# Patient Record
Sex: MALE | Race: WHITE | Employment: OTHER | URBAN - METROPOLITAN AREA
[De-identification: names, ages, dates, MRNs, and addresses within clinical notes are randomized per-mention and may not be internally consistent; named-entity substitution may affect disease eponyms.]

---

## 2019-04-17 ENCOUNTER — OFFICE VISIT (OUTPATIENT)
Dept: CARDIOLOGY CLINIC | Age: 74
End: 2019-04-17
Payer: MEDICARE

## 2019-04-17 VITALS
BODY MASS INDEX: 36.93 KG/M2 | WEIGHT: 229.8 LBS | HEIGHT: 66 IN | SYSTOLIC BLOOD PRESSURE: 130 MMHG | HEART RATE: 67 BPM | DIASTOLIC BLOOD PRESSURE: 76 MMHG

## 2019-04-17 DIAGNOSIS — E11.9 TYPE 2 DIABETES MELLITUS WITHOUT COMPLICATION, WITHOUT LONG-TERM CURRENT USE OF INSULIN (HCC): ICD-10-CM

## 2019-04-17 DIAGNOSIS — I48.0 PAROXYSMAL ATRIAL FIBRILLATION (HCC): Primary | ICD-10-CM

## 2019-04-17 DIAGNOSIS — E66.9 OBESITY (BMI 30-39.9): ICD-10-CM

## 2019-04-17 DIAGNOSIS — I10 ESSENTIAL HYPERTENSION: ICD-10-CM

## 2019-04-17 DIAGNOSIS — R06.02 SHORTNESS OF BREATH: ICD-10-CM

## 2019-04-17 DIAGNOSIS — E78.49 OTHER HYPERLIPIDEMIA: ICD-10-CM

## 2019-04-17 DIAGNOSIS — I48.91 ATRIAL FIBRILLATION, UNSPECIFIED TYPE (HCC): ICD-10-CM

## 2019-04-17 DIAGNOSIS — G47.33 OSA (OBSTRUCTIVE SLEEP APNEA): ICD-10-CM

## 2019-04-17 PROCEDURE — 1123F ACP DISCUSS/DSCN MKR DOCD: CPT | Performed by: INTERNAL MEDICINE

## 2019-04-17 PROCEDURE — 3046F HEMOGLOBIN A1C LEVEL >9.0%: CPT | Performed by: INTERNAL MEDICINE

## 2019-04-17 PROCEDURE — G8417 CALC BMI ABV UP PARAM F/U: HCPCS | Performed by: INTERNAL MEDICINE

## 2019-04-17 PROCEDURE — 93000 ELECTROCARDIOGRAM COMPLETE: CPT | Performed by: INTERNAL MEDICINE

## 2019-04-17 PROCEDURE — 2022F DILAT RTA XM EVC RTNOPTHY: CPT | Performed by: INTERNAL MEDICINE

## 2019-04-17 PROCEDURE — 99204 OFFICE O/P NEW MOD 45 MIN: CPT | Performed by: INTERNAL MEDICINE

## 2019-04-17 PROCEDURE — 1036F TOBACCO NON-USER: CPT | Performed by: INTERNAL MEDICINE

## 2019-04-17 PROCEDURE — 4040F PNEUMOC VAC/ADMIN/RCVD: CPT | Performed by: INTERNAL MEDICINE

## 2019-04-17 PROCEDURE — G8427 DOCREV CUR MEDS BY ELIG CLIN: HCPCS | Performed by: INTERNAL MEDICINE

## 2019-04-17 PROCEDURE — 3017F COLORECTAL CA SCREEN DOC REV: CPT | Performed by: INTERNAL MEDICINE

## 2019-04-17 RX ORDER — LOSARTAN POTASSIUM 50 MG/1
1 TABLET ORAL DAILY
COMMUNITY
End: 2022-03-17 | Stop reason: SDUPTHER

## 2019-04-17 RX ORDER — LOVASTATIN 40 MG/1
40 TABLET ORAL DAILY
COMMUNITY
Start: 2018-11-27

## 2019-04-17 RX ORDER — MULTIVIT-MIN/FOLIC/VIT K/LYCOP 400-300MCG
1 TABLET ORAL DAILY
COMMUNITY

## 2019-04-17 RX ORDER — AMLODIPINE BESYLATE 10 MG/1
10 TABLET ORAL DAILY
Status: ON HOLD | COMMUNITY
Start: 2018-11-27 | End: 2020-10-09 | Stop reason: HOSPADM

## 2019-04-17 RX ORDER — WARFARIN SODIUM 6 MG/1
6 TABLET ORAL DAILY
COMMUNITY
End: 2019-08-01 | Stop reason: ALTCHOICE

## 2019-04-17 RX ORDER — ALLOPURINOL 300 MG/1
1 TABLET ORAL DAILY
COMMUNITY

## 2019-04-17 RX ORDER — METFORMIN HYDROCHLORIDE 500 MG/1
1 TABLET, EXTENDED RELEASE ORAL DAILY
COMMUNITY

## 2019-04-17 RX ORDER — ASPIRIN 81 MG/1
1 TABLET, CHEWABLE ORAL DAILY
COMMUNITY

## 2019-04-17 RX ORDER — METOPROLOL TARTRATE 50 MG/1
50 TABLET, FILM COATED ORAL 2 TIMES DAILY
COMMUNITY
End: 2019-08-01 | Stop reason: DRUGHIGH

## 2019-04-17 RX ORDER — HYDROCHLOROTHIAZIDE 25 MG/1
1 TABLET ORAL DAILY
COMMUNITY

## 2019-04-17 RX ORDER — TAMSULOSIN HYDROCHLORIDE 0.4 MG/1
0.4 CAPSULE ORAL DAILY
COMMUNITY
Start: 2018-11-27

## 2019-04-17 NOTE — LETTER
[facility]  [facility] Procedure Sheet    4/17/2019    Naa Jameson  1945    EP Procedures     Pacemaker implant  EP Study    ICD implant  Atrial flutter ablation     Biv implant  Atrial fibrillation ablation    Generator Change  SVT ablation    Lead revision  VT ablation    Lead extraction +/- upgrade  AVN ablation   x Loop implant  Cardioversion     Other:   LISSA     Equipment    x Medtronic   CHRISTELLE Mapping System    [employer]  Carto Mapping System    [employer]  CryoAblation    Biotronik  Laser Lead Extraction    Special Equipment       EP Procedures Scheduling Request    Time Requested     Specific Day    Anesthesia    CT surgery backup    Location [facility] w/ Dr Pascual     Pre-Procedure Labs / Imaging     PT/INR  Type & cross    CBC  Units PRBC    BMP/Mg  Units FFP    Venogram  CXR    Echo  Cardiac CTA for Pulmonary vein mapping     Patient Instructions

## 2019-04-17 NOTE — PROGRESS NOTES
[address]   Electrophysiology Consultation   Date: 4/17/2019  Reason for Consultation: Atrial fibrillation   Consult Requesting Physician: Jamacia Marcelino MD      Chief Complaint   Patient presents with    Atrial Fibrillation        HPI: Jamie Rose is a 68 y.o. seen today at the request of Dr Eldon Junior. He states he was seen at [facility] 2/23/19 with heart racing and elevated blood pressure and shortness of breath . He was in atrial fibrillation and placed on Metoprolol and Xarelto. He then saw his pcp who changed Xarelto to Coumadin due to cost. Other history includes hypertension, hyperlipidemia, diabetes, and sleep apnea. He is newly treated for sleep apnea with C-pap. He does not smoke. Suzie Roman is here with his daughter and wife. Mr Makayla Cordova denies any chest pain, palpitations, HENDERSON, dizziness, or edema at this time. He is physically active. He recently moved here from [place].       Past Medical History:   Diagnosis Date    [facility]     Diabetes mellitus ([facility] [facility]     Hyperlipidemia     Hypertension         History reviewed. No pertinent surgical history. Allergies: Allergies   Allergen Reactions    Amoxicillin Rash       Social History:   reports that he has never smoked. He has never used smokeless tobacco. He reports that he drinks about 1.8 oz of alcohol per week. He reports that he does not use drugs. Family History:  family history includes Arrhythmia in his mother; Heart Attack in his maternal uncle; High Cholesterol in his father; Hypertension in his father. Reviewed. Denies family history of sudden cardiac death, arrhythmia, premature CAD    Review of System:  All other systems reviewed and are negative except for that noted above.  Pertinent negatives are:   General: negative for fever, chills   Ophthalmic ROS: negative for - eye pain or loss of vision  ENT ROS: negative for - headaches, sore throat   Respiratory: negative for - cough, sputum  Cardiovascular:

## 2019-04-17 NOTE — LETTER
[address]  [address]  [address] [address]  Phone: [phone]  Fax: [phone]    Sofia Kahn MD        April 19, 2019     Celeste Barrett MD  [id number]    Patient: Brendan Allen  MR Number: [id number]  YOB: 1945  Date of Visit: 4/17/2019    Dear Dr. Celeste Barrett:    [address]   Electrophysiology Consultation   Date: 4/17/2019  Reason for Consultation: Atrial fibrillation   Consult Requesting Physician: Celeste Barrett MD      Chief Complaint   Patient presents with    Atrial Fibrillation        HPI: Brendan Allen is a 68 y.o. seen today at the request of Dr Mary Novak. He states he was seen at [facility] ER 2/23/19 with heart racing and elevated blood pressure and shortness of breath . He was in atrial fibrillation and placed on Metoprolol and Xarelto. He then saw his pcp who changed Xarelto to Coumadin due to cost. Other history includes hypertension, hyperlipidemia, diabetes, and sleep apnea. He is newly treated for sleep apnea with C-pap. He does not smoke. Jerad Ellis is here with his daughter and wife. Mr Shawn Tillman denies any chest pain, palpitations, HENDERSON, dizziness, or edema at this time. He is physically active. He recently moved here from [place].       Past Medical History:   Diagnosis Date    [facility] [address]     Diabetes mellitus ([facility] [address] [facility]     Hyperlipidemia     Hypertension         History reviewed. No pertinent surgical history. Allergies: Allergies   Allergen Reactions    Amoxicillin Rash       Social History:   reports that he has never smoked. He has never used smokeless tobacco. He reports that he drinks about 1.8 oz of alcohol per week. He reports that he does not use drugs. Family History:  family history includes Arrhythmia in his mother; Heart Attack in his maternal uncle; High Cholesterol in his father; Hypertension in his father. Reviewed. Denies family history of sudden cardiac death, arrhythmia, premature CAD    Review of System:  All other systems reviewed and are negative except for that noted above. Pertinent negatives are:   General: negative for fever, chills   Ophthalmic ROS: negative for - eye pain or loss of vision  ENT ROS: negative for - headaches, sore throat   Respiratory: negative for - cough, sputum  Cardiovascular: Reviewed in HPI  Gastrointestinal: negative for - abdominal pain, diarrhea, N/V  Hematology: negative for - bleeding, blood clots, bruising or jaundice  Genito-Urinary:  negative for - Dysuria or incontinence  Musculoskeletal: negative for - Joint swelling, muscle pain  Neurological: negative for - confusion, dizziness, headaches   Psychiatric: No anxiety, no depression. Dermatological: negative for - rash    Physical Examination:  Vitals:    04/17/19 1547   BP: 130/76   Pulse: 67      Wt Readings from Last 3 Encounters:   04/17/19 229 lb 12.8 oz (104.2 kg)       · Constitutional: Oriented. No distress. obese  · Head: Normocephalic and atraumatic. · Mouth/Throat: Oropharynx is clear and moist.   · Eyes: Conjunctivae normal. EOM are normal.   · Neck: Neck supple. No rigidity. No JVD present. · Cardiovascular: Normal rate, regular rhythm, S1&S2. · Pulmonary/Chest: Bilateral respiratory sounds. No wheezes, No rhonchi. · Abdominal: Soft. Bowel sounds present. No distension, No tenderness. · Musculoskeletal: No tenderness. No edema    · Lymphadenopathy: Has no cervical adenopathy. · Neurological: Alert and oriented. Cranial nerve appears intact, No Gross deficit   · Skin: Skin is warm and dry. No rash noted. · Psychiatric: Has a normal behavior       Labs, diagnostic and imaging results reviewed. Reviewed. ECG:  Sinus arrhythmia 61  Echo: 3/1/19: EF 50-55%. The left ventricular function is low normal.  There is mild global hypokinesis of the left ventricle. The diastolic function is impaired and classified as Grade 2  (psuedonormalization). The left atrium is moderately dilated. There is mild mitral regurgitation. Right ventricular systolic pressure is indeterminate due to poorly  visualized tricuspid regurgitation      Medication:  Current Outpatient Medications   Medication Sig Dispense Refill    allopurinol (ZYLOPRIM) 300 MG tablet Take 1 tablet by mouth daily      metFORMIN (GLUCOPHAGE-XR) 500 MG extended release tablet Take 1 tablet by mouth daily      amLODIPine (NORVASC) 10 MG tablet Take 10 mg by mouth daily      losartan (COZAAR) 50 MG tablet Take 1 tablet by mouth daily      hydrochlorothiazide (HYDRODIURIL) 25 MG tablet Take 1 tablet by mouth daily      lovastatin (MEVACOR) 40 MG tablet Take 40 mg by mouth daily      tamsulosin (FLOMAX) 0.4 MG capsule Take 0.4 mg by mouth daily      aspirin 81 MG chewable tablet Take 1 mg by mouth daily      Specialty Vitamins Products (PROSTATE) TABS Take 2 daily      Multiple Vitamin (ONE-A-DAY MENS) TABS Take 1 tablet by mouth daily      NONFORMULARY Nuretin: takes 2 daily      Multiple Vitamins-Minerals (ICAPS AREDS 2) CAPS Take by mouth 2 daily      warfarin (COUMADIN) 6 MG tablet Take 6 mg by mouth daily      metoprolol tartrate (LOPRESSOR) 50 MG tablet Take 50 mg by mouth 2 times daily       No current facility-administered medications for this visit. Assessment and plan:       1. Paroxysmal atrial fibrillation (HCC)              He is symptomatic with shortness of breath and palpitations    - Afib risk factors including age, HTN, obesity, inactivity and DANO were discussed with patient. Risk factor modification recommended. All questions were answered. - Treatment options including cardioversion, rate control strategy, anticoagulation were discussed with patient. Risks, benefits and alternative of each treatment options were explained. All questions answered. - pcp has placed him on Coumadin due to cost of Xarelto. He has had very good work up by his PCP     [id number] score at least 3. Continue warfarin               - discussed next step with atrial fibrillation including ILR vs MCOT to monitor burden and decide about antiarrhythmic drugs vs ablation or medical therapy    Stress test to assess for CAD     2. Essential hypertension             - follows with pcp, on ARB, CCB, and BB   BP is well controlled. Continue current meds. 3. Other hyperlipidemia             - follows with pcp. On statin   On statin    4. Sleep apnea              - newly diagnosed, using cpap nightly      5. Obesity   Diet and exercise    Plan  Stress  Implantable Loop recorder  F/u 3 mo    Scribe's attestation: This note was scribed in the presence of Dr Gala Cruz MD by Carol Hugo RN.  I, Dr. Angel Chery personally performed the services described in this documentation as scribed by RN in my presence, and it is both accurate and complete.            Thank you for allowing me to participate in the care of [facility]. Further evaluation will be based upon the patient's clinical course and testing results. All questions and concerns were addressed to the patient/family. Alternatives to my treatment were discussed. I have discussed the above stated plan and the patient verbalized understanding and agreed with the plan. NOTE: This report was transcribed using voice recognition software. Every effort was made to ensure accuracy, however, inadvertent computerized transcription errors may be present. Angel Chery MD, Ariella Reyes, [facility]   Office: [phone]           If you have questions, please do not hesitate to call me. I look forward to following Ressie Stage along with you.     Sincerely,        Angel Chery MD

## 2019-04-24 ENCOUNTER — TELEPHONE (OUTPATIENT)
Dept: CARDIOLOGY CLINIC | Age: 74
End: 2019-04-24

## 2019-04-24 ENCOUNTER — HOSPITAL ENCOUNTER (OUTPATIENT)
Dept: NON INVASIVE DIAGNOSTICS | Age: 74
Discharge: HOME | End: 2019-04-24
Payer: MEDICARE

## 2019-04-24 ENCOUNTER — APPOINTMENT (OUTPATIENT)
Dept: CARDIAC CATH/INVASIVE PROCEDURES | Age: 74
End: 2019-04-24
Payer: MEDICARE

## 2019-04-24 DIAGNOSIS — I10 ESSENTIAL HYPERTENSION: ICD-10-CM

## 2019-04-24 DIAGNOSIS — R06.02 SHORTNESS OF BREATH: Primary | ICD-10-CM

## 2019-04-24 DIAGNOSIS — I48.91 ATRIAL FIBRILLATION, UNSPECIFIED TYPE (HCC): ICD-10-CM

## 2019-04-24 LAB
LV EF: 73 %
LVEF MODALITY: NORMAL

## 2019-04-24 PROCEDURE — 93017 CV STRESS TEST TRACING ONLY: CPT | Performed by: INTERNAL MEDICINE

## 2019-04-24 PROCEDURE — 3430000000 HC RX DIAGNOSTIC RADIOPHARMACEUTICAL: Performed by: INTERNAL MEDICINE

## 2019-04-24 PROCEDURE — 78452 HT MUSCLE IMAGE SPECT MULT: CPT

## 2019-04-24 PROCEDURE — A9502 TC99M TETROFOSMIN: HCPCS | Performed by: INTERNAL MEDICINE

## 2019-04-24 RX ADMIN — TETROFOSMIN 30 MILLICURIE: 0.23 INJECTION, POWDER, LYOPHILIZED, FOR SOLUTION INTRAVENOUS at 11:24

## 2019-04-24 RX ADMIN — TETROFOSMIN 10 MILLICURIE: 0.23 INJECTION, POWDER, LYOPHILIZED, FOR SOLUTION INTRAVENOUS at 09:12

## 2019-04-24 NOTE — PROGRESS NOTES
Patient instructed on Franki Protocol Stress Test Procedure including possible side effects and adverse reactions. Verbalizes knowledge and understanding and denies having any questions.

## 2019-04-24 NOTE — TELEPHONE ENCOUNTER
A gentleman called stating that pt ST did not pass medical necessity with the given codes. I resubmitted new order. I was told by Denice Goodrich that they could not do anything with the orde because it was grayed out. I called CS and spoke with Dawn and told her of the dilemma and she is attempting to remove other order.       She was unable to remove so will be contacting help desk to take off the extra

## 2019-04-25 ENCOUNTER — HOSPITAL ENCOUNTER (OUTPATIENT)
Dept: NON INVASIVE DIAGNOSTICS | Age: 74
Discharge: HOME | End: 2019-04-25
Payer: COMMERCIAL

## 2019-04-25 DIAGNOSIS — R06.02 SHORTNESS OF BREATH: ICD-10-CM

## 2019-04-29 ENCOUNTER — HOSPITAL ENCOUNTER (OUTPATIENT)
Dept: CARDIAC CATH/INVASIVE PROCEDURES | Age: 74
Discharge: HOME | End: 2019-04-29
Attending: INTERNAL MEDICINE | Admitting: INTERNAL MEDICINE
Payer: MEDICARE

## 2019-04-29 PROCEDURE — 33285 INSJ SUBQ CAR RHYTHM MNTR: CPT | Performed by: INTERNAL MEDICINE

## 2019-04-29 PROCEDURE — C1764 EVENT RECORDER, CARDIAC: HCPCS

## 2019-04-29 PROCEDURE — 33285 INSJ SUBQ CAR RHYTHM MNTR: CPT

## 2019-04-29 NOTE — PROCEDURES
[address]     Electrophysiology Procedure Note       Date of Procedure: 4/29/2019  Patient's Name: Diego Loaiza  YOB: 1945   Medical Record Number: [id number]  Procedure Performed by: Fiordaliza Foster MD    Procedures performed:    · Loop recorder implantation    Indication of the procedure: Atrial fibrillation , Palpitation   Diego Loaiza is a 68 y.o. male with   Atrial fibrillation   Details of procedure:   Procedure's risks, benefits and alternatives of procedure were explained to patient. All questions were answered. Patient understood and informed consent was obtained. The patient was brought to the electrophysiology lab in a fasting nonsedated state. Patient was prepped and draped in usual sterile fashion. After injection of 2% lidocaine in the left 4th intercostal area, a 5 mm small incision was made. Using ILR insertion device, a small subcutaneous tunnel was created and the loop recorder was inserted under skin. The skin was covered with Steri-Strips. Device information:   The device is Reveal LINQ SN# [id number] Medtronic Implant date: 4/29/2019  R wave 0.41 mv  The device was programmed to detect:   VT: 380 ms 16 beats   Bradycardia: 2000 ms     Plan:   The patient will have usual post-implant care. Patient can be discharged home if remains stable with follow up in arrhythmia clinic.

## 2019-04-29 NOTE — H&P
[address]   Electrophysiology   Date: 4/17/2019  Reason for Consultation: Atrial fibrillation   Consult Requesting Physician: Chelle Altamirano MD      Chief Complaint   Patient presents with    Atrial Fibrillation        HPI: She Sanders is a 68 y.o. seen today at the request of Dr Meeta Ryder. He states he was seen at [facility] ER 2/23/19 with heart racing and elevated blood pressure and shortness of breath . He was in atrial fibrillation and placed on Metoprolol and Xarelto. He then saw his pcp who changed Xarelto to Coumadin due to cost. Other history includes hypertension, hyperlipidemia, diabetes, and sleep apnea. He is newly treated for sleep apnea with C-pap. He does not smoke. Donna Cunningham is here with his daughter and wife. Mr Andrew Salcedo denies any chest pain, palpitations, HENDERSON, dizziness, or edema at this time. He is physically active. He recently moved here from [place].       Past Medical History:   Diagnosis Date    [facility] disorder ([facility] [address]     Diabetes mellitus ([facility] [address]     Hyperlipidemia     Hypertension         History reviewed. No pertinent surgical history. Allergies: Allergies   Allergen Reactions    Amoxicillin Rash       Social History:   reports that he has never smoked. He has never used smokeless tobacco. He reports that he drinks about 1.8 oz of alcohol per week. He reports that he does not use drugs. Family History:  family history includes Arrhythmia in his mother; Heart Attack in his maternal uncle; High Cholesterol in his father; Hypertension in his father. Reviewed. Denies family history of sudden cardiac death, arrhythmia, premature CAD    Review of System:  All other systems reviewed and are negative except for that noted above.  Pertinent negatives are:   General: negative for fever, chills   Ophthalmic ROS: negative for - eye pain or loss of vision  ENT ROS: negative for - headaches, sore throat   Respiratory: negative for - cough, sputum  Cardiovascular: Reviewed in HPI  Gastrointestinal: negative for - abdominal pain, diarrhea, N/V  Hematology: negative for - bleeding, blood clots, bruising or jaundice  Genito-Urinary:  negative for - Dysuria or incontinence  Musculoskeletal: negative for - Joint swelling, muscle pain  Neurological: negative for - confusion, dizziness, headaches   Psychiatric: No anxiety, no depression. Dermatological: negative for - rash    Physical Examination:  Vitals:    04/17/19 1547   BP: 130/76   Pulse: 67      Wt Readings from Last 3 Encounters:   04/17/19 229 lb 12.8 oz (104.2 kg)       · Constitutional: Oriented. No distress. obese  · Head: Normocephalic and atraumatic. · Mouth/Throat: Oropharynx is clear and moist.   · Eyes: Conjunctivae normal. EOM are normal.   · Neck: Neck supple. No rigidity. No JVD present. · Cardiovascular: Normal rate, regular rhythm, S1&S2. · Pulmonary/Chest: Bilateral respiratory sounds. No wheezes, No rhonchi. · Abdominal: Soft. Bowel sounds present. No distension, No tenderness. · Musculoskeletal: No tenderness. No edema    · Lymphadenopathy: Has no cervical adenopathy. · Neurological: Alert and oriented. Cranial nerve appears intact, No Gross deficit   · Skin: Skin is warm and dry. No rash noted. · Psychiatric: Has a normal behavior       Labs, diagnostic and imaging results reviewed. Reviewed. ECG:  Sinus arrhythmia 61  Echo: 3/1/19: EF 50-55%. The left ventricular function is low normal.  There is mild global hypokinesis of the left ventricle. The diastolic function is impaired and classified as Grade 2  (psuedonormalization). The left atrium is moderately dilated. There is mild mitral regurgitation.   Right ventricular systolic pressure is indeterminate due to poorly  visualized tricuspid regurgitation      Medication:  Current Outpatient Medications   Medication Sig Dispense Refill    allopurinol (ZYLOPRIM) 300 MG tablet Take 1 tablet by mouth daily      metFORMIN (GLUCOPHAGE-XR) 500 MG extended release tablet Take 1 tablet by mouth daily      amLODIPine (NORVASC) 10 MG tablet Take 10 mg by mouth daily      losartan (COZAAR) 50 MG tablet Take 1 tablet by mouth daily      hydrochlorothiazide (HYDRODIURIL) 25 MG tablet Take 1 tablet by mouth daily      lovastatin (MEVACOR) 40 MG tablet Take 40 mg by mouth daily      tamsulosin (FLOMAX) 0.4 MG capsule Take 0.4 mg by mouth daily      aspirin 81 MG chewable tablet Take 1 mg by mouth daily      Specialty Vitamins Products (PROSTATE) TABS Take 2 daily      Multiple Vitamin (ONE-A-DAY MENS) TABS Take 1 tablet by mouth daily      NONFORMULARY Nuretin: takes 2 daily      Multiple Vitamins-Minerals (ICAPS AREDS 2) CAPS Take by mouth 2 daily      warfarin (COUMADIN) 6 MG tablet Take 6 mg by mouth daily      metoprolol tartrate (LOPRESSOR) 50 MG tablet Take 50 mg by mouth 2 times daily       No current facility-administered medications for this visit. Assessment and plan:       1. Paroxysmal atrial fibrillation (HCC)              He is symptomatic with shortness of breath and palpitations    - Afib risk factors including age, HTN, obesity, inactivity and DANO were discussed with patient. Risk factor modification recommended. All questions were answered. - Treatment options including cardioversion, rate control strategy, anticoagulation were discussed with patient. Risks, benefits and alternative of each treatment options were explained. All questions answered. - pcp has placed him on Coumadin due to cost of Xarelto. He has had very good work up by his PCP     [id number] score at least 3. Continue warfarin               - discussed next step with atrial fibrillation including ILR vs MCOT to monitor burden and decide about antiarrhythmic drugs vs ablation or medical therapy    Stress test to assess for CAD     2. Essential hypertension             - follows with pcp, on ARB, CCB, and BB   BP is well controlled. Continue current meds. 3. Other hyperlipidemia             - follows with pcp. On statin   On statin    4. Sleep apnea              - newly diagnosed, using cpap nightly      5. Obesity   Diet and exercise    Plan    Implantable Loop recorder    NOTE: This report was transcribed using voice recognition software. Every effort was made to ensure accuracy, however, inadvertent computerized transcription errors may be present.        Ben Pardo MD, [facility] [address] [facility]   Office: [phone]

## 2019-05-07 ENCOUNTER — TELEPHONE (OUTPATIENT)
Dept: CARDIOLOGY CLINIC | Age: 74
End: 2019-05-07

## 2019-05-07 NOTE — TELEPHONE ENCOUNTER
Pt calling, he has a device ck tomorrow and just wanted to let KAUR know that he has been in Afib every day for a week now. He is wanting to see if someone can look at his readings while in the office tomorrow. Pls call to advise. Thank you.

## 2019-05-08 ENCOUNTER — PROCEDURE VISIT (OUTPATIENT)
Dept: CARDIOLOGY CLINIC | Age: 74
End: 2019-05-08
Payer: MEDICARE

## 2019-05-08 DIAGNOSIS — Z45.09 ENCOUNTER FOR ELECTRONIC ANALYSIS OF REVEAL EVENT RECORDER: Primary | ICD-10-CM

## 2019-05-08 DIAGNOSIS — I48.0 PAROXYSMAL ATRIAL FIBRILLATION (HCC): ICD-10-CM

## 2019-05-08 PROCEDURE — 93291 INTERROG DEV EVAL SCRMS IP: CPT | Performed by: INTERNAL MEDICINE

## 2019-05-08 NOTE — PROGRESS NOTES
Patient comes in for interrogation of his implanted loop recorder. His incision is healing nicely. Steri strips remain secured. Interrogation shows patient to be back in AF. He states he feels miserable when he is in this and this is the longest he has been in AF. Today we moved his appt up with Dr. Canelo Blake to next week. He remains on coumadin.

## 2019-05-16 ENCOUNTER — NURSE ONLY (OUTPATIENT)
Dept: CARDIOLOGY CLINIC | Age: 74
End: 2019-05-16
Payer: MEDICARE

## 2019-05-16 ENCOUNTER — OFFICE VISIT (OUTPATIENT)
Dept: CARDIOLOGY CLINIC | Age: 74
End: 2019-05-16
Payer: MEDICARE

## 2019-05-16 VITALS
WEIGHT: 231 LBS | HEIGHT: 66 IN | HEART RATE: 73 BPM | DIASTOLIC BLOOD PRESSURE: 74 MMHG | BODY MASS INDEX: 37.12 KG/M2 | SYSTOLIC BLOOD PRESSURE: 114 MMHG

## 2019-05-16 DIAGNOSIS — E78.2 MIXED HYPERLIPIDEMIA: ICD-10-CM

## 2019-05-16 DIAGNOSIS — Z45.09 ENCOUNTER FOR ELECTRONIC ANALYSIS OF REVEAL EVENT RECORDER: ICD-10-CM

## 2019-05-16 DIAGNOSIS — I48.0 PAROXYSMAL ATRIAL FIBRILLATION (HCC): ICD-10-CM

## 2019-05-16 DIAGNOSIS — G47.33 OSA (OBSTRUCTIVE SLEEP APNEA): ICD-10-CM

## 2019-05-16 DIAGNOSIS — I48.0 PAROXYSMAL ATRIAL FIBRILLATION (HCC): Primary | ICD-10-CM

## 2019-05-16 DIAGNOSIS — I10 BENIGN ESSENTIAL HTN: ICD-10-CM

## 2019-05-16 PROCEDURE — G8427 DOCREV CUR MEDS BY ELIG CLIN: HCPCS | Performed by: INTERNAL MEDICINE

## 2019-05-16 PROCEDURE — 93291 INTERROG DEV EVAL SCRMS IP: CPT | Performed by: INTERNAL MEDICINE

## 2019-05-16 PROCEDURE — 4040F PNEUMOC VAC/ADMIN/RCVD: CPT | Performed by: INTERNAL MEDICINE

## 2019-05-16 PROCEDURE — 1036F TOBACCO NON-USER: CPT | Performed by: INTERNAL MEDICINE

## 2019-05-16 PROCEDURE — 93000 ELECTROCARDIOGRAM COMPLETE: CPT | Performed by: INTERNAL MEDICINE

## 2019-05-16 PROCEDURE — 99214 OFFICE O/P EST MOD 30 MIN: CPT | Performed by: INTERNAL MEDICINE

## 2019-05-16 PROCEDURE — G8417 CALC BMI ABV UP PARAM F/U: HCPCS | Performed by: INTERNAL MEDICINE

## 2019-05-16 PROCEDURE — 3017F COLORECTAL CA SCREEN DOC REV: CPT | Performed by: INTERNAL MEDICINE

## 2019-05-16 PROCEDURE — 1123F ACP DISCUSS/DSCN MKR DOCD: CPT | Performed by: INTERNAL MEDICINE

## 2019-05-16 NOTE — PROGRESS NOTES
[address]   Electrophysiology Follow Up   Date: 5/16/2019    Chief Complaint   Patient presents with    Atrial Fibrillation        HPI: Richard Sierra is a 68 y.o. seen today at the request of Dr Yousif Sanderson. He states he was seen at [facility] ER 2/23/19 with heart racing and elevated blood pressure and shortness of breath . He was in atrial fibrillation and placed on Metoprolol and Xarelto. He then saw his pcp who changed Xarelto to Coumadin due to cost. Other history includes hypertension, hyperlipidemia, diabetes, and sleep apnea. He is newly treated for sleep apnea with C-pap. He does not smoke. Francisco Bee is here with his daughter and wife. Mr Matilda Brito denies any chest pain, palpitations, HENDERSON, dizziness, or edema at this time. He is physically active. He recently moved here from [address] [place] [address] states that he can feel he is out of rhythm. He does have complaints of fatigue. He is on coumadin and is followed by the coumadin clinic. He states that his stamina is at about 50 %. Past Medical History:   Diagnosis Date    [facility] [facility]     Diabetes mellitus ([facility]     Hyperlipidemia     Hypertension         History reviewed. No pertinent surgical history. Allergies: Allergies   Allergen Reactions    Amoxicillin Rash       Social History:   reports that he has never smoked. He has never used smokeless tobacco. He reports that he drinks about 1.8 oz of alcohol per week. He reports that he does not use drugs. Family History:  family history includes Arrhythmia in his mother; Heart Attack in his maternal uncle; High Cholesterol in his father; Hypertension in his father. Reviewed. Denies family history of sudden cardiac death, arrhythmia, premature CAD    Review of System:  All other systems reviewed and are negative except for that noted above.  Pertinent negatives are:   General: negative for fever, chills   Ophthalmic ROS: negative for - eye pain or loss of vision  ENT ROS: negative for - headaches, sore throat   Respiratory: negative for - cough, sputum  Cardiovascular: Reviewed in HPI  Gastrointestinal: negative for - abdominal pain, diarrhea, N/V  Hematology: negative for - bleeding, blood clots, bruising or jaundice  Genito-Urinary:  negative for - Dysuria or incontinence  Musculoskeletal: negative for - Joint swelling, muscle pain  Neurological: negative for - confusion, dizziness, headaches   Psychiatric: No anxiety, no depression. Dermatological: negative for - rash    Physical Examination:  Vitals:    19 1120   BP: 114/74   Pulse: 73      Wt Readings from Last 3 Encounters:   19 231 lb (104.8 kg)   19 229 lb 12.8 oz (104.2 kg)       · Constitutional: Oriented. No distress. obese  · Head: Normocephalic and atraumatic. · Mouth/Throat: Oropharynx is clear and moist.   · Eyes: Conjunctivae normal. EOM are normal.   · Neck: Neck supple. No rigidity. No JVD present. · Cardiovascular: Normal rate, irregular rhythm, S1&S2. · Pulmonary/Chest: Bilateral respiratory sounds. No wheezes, No rhonchi. · Abdominal: Soft. Bowel sounds present. No distension, No tenderness. · Musculoskeletal: No tenderness. No edema    · Lymphadenopathy: Has no cervical adenopathy. · Neurological: Alert and oriented. Cranial nerve appears intact, No Gross deficit   · Skin: Skin is warm and dry. No rash noted. · Psychiatric: Has a normal behavior       Labs, diagnostic and imaging results reviewed. Reviewed. No results found for: TSHREFLEX, TSH, CREATININE, AMIOD, AST, ALT    EC19  Afib  QTc 404    Nuclear Stress 19  Summary    Normal LV size with hyperdynamic systolic function.    Left ventricular ejection fraction of 73 %.    There is normal isotope uptake at stress and rest.    There is no evidence of myocardial ischemia or scar. Echo: 3/1/19: EF 50-55%. The left ventricular function is low normal.  There is mild global hypokinesis of the left ventricle.   The diastolic function is impaired and classified as Grade 2  (psuedonormalization). The left atrium is moderately dilated. There is mild mitral regurgitation. Right ventricular systolic pressure is indeterminate due to poorly  visualized tricuspid regurgitation      Medication:  Current Outpatient Medications   Medication Sig Dispense Refill    allopurinol (ZYLOPRIM) 300 MG tablet Take 1 tablet by mouth daily      metFORMIN (GLUCOPHAGE-XR) 500 MG extended release tablet Take 1 tablet by mouth daily      amLODIPine (NORVASC) 10 MG tablet Take 10 mg by mouth daily      losartan (COZAAR) 50 MG tablet Take 1 tablet by mouth daily      hydrochlorothiazide (HYDRODIURIL) 25 MG tablet Take 1 tablet by mouth daily      lovastatin (MEVACOR) 40 MG tablet Take 40 mg by mouth daily      tamsulosin (FLOMAX) 0.4 MG capsule Take 0.4 mg by mouth daily      aspirin 81 MG chewable tablet Take 1 mg by mouth daily      Specialty Vitamins Products (PROSTATE) TABS Take 2 daily      Multiple Vitamin (ONE-A-DAY MENS) TABS Take 1 tablet by mouth daily      NONFORMULARY Nuretin: takes 2 daily      Multiple Vitamins-Minerals (ICAPS AREDS 2) CAPS Take by mouth 2 daily      warfarin (COUMADIN) 6 MG tablet Take 6 mg by mouth daily      metoprolol tartrate (LOPRESSOR) 50 MG tablet Take 50 mg by mouth 2 times daily       No current facility-administered medications for this visit. Assessment and plan:       Paroxysmal atrial fibrillation (HCC)   Will Schedule DCCV and if he goes back into afib discussion of antiarrythmic medications and ablation will be considered           He is symptomatic with shortness of breath and palpitations    - Afib risk factors including age, HTN, obesity, inactivity and DANO were discussed with patient. Risk factor modification recommended. All questions were answered. - Treatment options including cardioversion, rate control strategy, anticoagulation were discussed with patient.  Risks, benefits

## 2019-05-16 NOTE — LETTER
[facility]  [facility]  Codi Elam  1945  EP Procedures     Pacemaker implant (single/dual)  EP Study    ICD implant (single/dual)  Atrial flutter ablation (LISSA Y/N)    Biv implant ICD  Cryoablation    Biv implant PPM  Atrial fibrillation ablation (LISSA Yes)    Generator Change (PPM/ICD/BiV)  SVT ablation    Lead revision (RV/LA/RA) (<1 month)  VT ablation      Lead extraction +/- upgrade (BiV/PPM/ICD)  VT Ischemic/ non-ischemic    Loop implant/ removal  VT RVOT   xxxx Cardioversion  VT Left sided    LISAS  AVN ablation     Equipment     [employer]   CHRISTELLE Mapping System    [employer]  [id number] [address] [employer]  CryoAblation    [employer]  Laser Lead Extraction    Special Equipment       EP Procedures Scheduling Request    # hours Requested     Specific Day    Anesthesia    CT surgery backup    Location [facility]     Pre-Procedure Labs / Imaging     PT/INR  Type & cross    CBC  Units PRBC    BMP/Mg  Units FFP    Venogram  CXR    Echo  Cardiac CTA for Pulmonary vein mapping     RN INITIALS:RH  Patient Instructions  Do not eat or drink after midnight the night prior to procedure  Dx: Afib

## 2019-05-31 ENCOUNTER — HOSPITAL ENCOUNTER (OUTPATIENT)
Dept: CARDIAC CATH/INVASIVE PROCEDURES | Age: 74
Discharge: HOME | End: 2019-05-31
Attending: INTERNAL MEDICINE | Admitting: INTERNAL MEDICINE
Payer: MEDICARE

## 2019-05-31 LAB
EKG ATRIAL RATE: 102 BPM
EKG DIAGNOSIS: NORMAL
EKG Q-T INTERVAL: 404 MS
EKG QRS DURATION: 100 MS
EKG QTC CALCULATION (BAZETT): 451 MS
EKG R AXIS: 38 DEGREES
EKG T AXIS: 35 DEGREES
EKG VENTRICULAR RATE: 75 BPM
GFR AFRICAN AMERICAN: >60
GFR NON-AFRICAN AMERICAN: >60
GLUCOSE BLD-MCNC: 98 MG/DL (ref 70–99)
INR BLD: 2.8 (ref 0.85–1.15)
PERFORMED ON: ABNORMAL
POC BUN: 26 MG/DL (ref 7–18)
POC CREATININE: 0.9 MG/DL (ref 0.8–1.3)
POC HEMATOCRIT: 44 % (ref 40.5–52.5)
POC POTASSIUM: 4.6 MMOL/L (ref 3.5–5.1)
POC SAMPLE TYPE: ABNORMAL
POC SODIUM: 139 MMOL/L (ref 136–145)

## 2019-05-31 PROCEDURE — 82947 ASSAY GLUCOSE BLOOD QUANT: CPT

## 2019-05-31 PROCEDURE — 84520 ASSAY OF UREA NITROGEN: CPT

## 2019-05-31 PROCEDURE — 84295 ASSAY OF SERUM SODIUM: CPT

## 2019-05-31 PROCEDURE — 93010 ELECTROCARDIOGRAM REPORT: CPT | Performed by: INTERNAL MEDICINE

## 2019-05-31 PROCEDURE — 82565 ASSAY OF CREATININE: CPT

## 2019-05-31 PROCEDURE — 84132 ASSAY OF SERUM POTASSIUM: CPT

## 2019-05-31 PROCEDURE — 2500000003 HC RX 250 WO HCPCS

## 2019-05-31 PROCEDURE — 92960 CARDIOVERSION ELECTRIC EXT: CPT | Performed by: INTERNAL MEDICINE

## 2019-05-31 PROCEDURE — 85610 PROTHROMBIN TIME: CPT

## 2019-05-31 PROCEDURE — 93005 ELECTROCARDIOGRAM TRACING: CPT | Performed by: INTERNAL MEDICINE

## 2019-05-31 PROCEDURE — 85014 HEMATOCRIT: CPT

## 2019-05-31 PROCEDURE — 92960 CARDIOVERSION ELECTRIC EXT: CPT

## 2019-05-31 PROCEDURE — 7100000010 HC PHASE II RECOVERY - FIRST 15 MIN

## 2019-05-31 NOTE — H&P
[facility]   Electrophysiology    Chief Complaint   Patient presents with    Atrial Fibrillation        HPI: Anne Rooney is a 68 y.o. seen today at the request of Dr Tello Hopkins. He states he was seen at [facility] ER 2/23/19 with heart racing and elevated blood pressure and shortness of breath . He was in atrial fibrillation and placed on Metoprolol and Xarelto. He then saw his pcp who changed Xarelto to Coumadin due to cost. Other history includes hypertension, hyperlipidemia, diabetes, and sleep apnea. He is newly treated for sleep apnea with C-pap. He does not smoke. Orquidea Otero is here with his daughter and wife. Mr Oscar Bazan denies any chest pain, palpitations, HENDERSON, dizziness, or edema at this time. He is physically active. He recently moved here from [address] [place] [address] states that he can feel he is out of rhythm. He does have complaints of fatigue. He is on coumadin and is followed by the coumadin clinic. He states that his stamina is at about 50 %. Past Medical History:   Diagnosis Date    Clotting disorder ([facility] [address]     Diabetes mellitus ([facility] [address] [facility]     Hyperlipidemia     Hypertension         History reviewed. No pertinent surgical history. Allergies: Allergies   Allergen Reactions    Amoxicillin Rash       Social History:   reports that he has never smoked. He has never used smokeless tobacco. He reports that he drinks about 1.8 oz of alcohol per week. He reports that he does not use drugs. Family History:  family history includes Arrhythmia in his mother; Heart Attack in his maternal uncle; High Cholesterol in his father; Hypertension in his father. Reviewed. Denies family history of sudden cardiac death, arrhythmia, premature CAD    Review of System:  All other systems reviewed and are negative except for that noted above.  Pertinent negatives are:   General: negative for fever, chills   Ophthalmic ROS: negative for - eye pain or loss of vision  ENT ROS: negative for - headaches, sore throat Respiratory: negative for - cough, sputum  Cardiovascular: Reviewed in HPI  Gastrointestinal: negative for - abdominal pain, diarrhea, N/V  Hematology: negative for - bleeding, blood clots, bruising or jaundice  Genito-Urinary:  negative for - Dysuria or incontinence  Musculoskeletal: negative for - Joint swelling, muscle pain  Neurological: negative for - confusion, dizziness, headaches   Psychiatric: No anxiety, no depression. Dermatological: negative for - rash    Physical Examination:  Vitals:    19 1120   BP: 114/74   Pulse: 73      Wt Readings from Last 3 Encounters:   19 231 lb (104.8 kg)   19 229 lb 12.8 oz (104.2 kg)       · Constitutional: Oriented. No distress. obese  · Head: Normocephalic and atraumatic. · Mouth/Throat: Oropharynx is clear and moist.   · Eyes: Conjunctivae normal. EOM are normal.   · Neck: Neck supple. No rigidity. No JVD present. · Cardiovascular: Normal rate, irregular rhythm, S1&S2. · Pulmonary/Chest: Bilateral respiratory sounds. No wheezes, No rhonchi. · Abdominal: Soft. Bowel sounds present. No distension, No tenderness. · Musculoskeletal: No tenderness. No edema    · Lymphadenopathy: Has no cervical adenopathy. · Neurological: Alert and oriented. Cranial nerve appears intact, No Gross deficit   · Skin: Skin is warm and dry. No rash noted. · Psychiatric: Has a normal behavior       Labs, diagnostic and imaging results reviewed. Reviewed. No results found for: TSHREFLEX, TSH, CREATININE, AMIOD, AST, ALT    EC19  Afib  QTc 404    Nuclear Stress 19  Summary    Normal LV size with hyperdynamic systolic function.    Left ventricular ejection fraction of 73 %.    There is normal isotope uptake at stress and rest.    There is no evidence of myocardial ischemia or scar. Echo: 3/1/19: EF 50-55%. The left ventricular function is low normal.  There is mild global hypokinesis of the left ventricle.   The diastolic function is impaired and classified as Grade 2  (psuedonormalization). The left atrium is moderately dilated. There is mild mitral regurgitation. Right ventricular systolic pressure is indeterminate due to poorly  visualized tricuspid regurgitation      Medication:  Current Outpatient Medications   Medication Sig Dispense Refill    allopurinol (ZYLOPRIM) 300 MG tablet Take 1 tablet by mouth daily      metFORMIN (GLUCOPHAGE-XR) 500 MG extended release tablet Take 1 tablet by mouth daily      amLODIPine (NORVASC) 10 MG tablet Take 10 mg by mouth daily      losartan (COZAAR) 50 MG tablet Take 1 tablet by mouth daily      hydrochlorothiazide (HYDRODIURIL) 25 MG tablet Take 1 tablet by mouth daily      lovastatin (MEVACOR) 40 MG tablet Take 40 mg by mouth daily      tamsulosin (FLOMAX) 0.4 MG capsule Take 0.4 mg by mouth daily      aspirin 81 MG chewable tablet Take 1 mg by mouth daily      Specialty Vitamins Products (PROSTATE) TABS Take 2 daily      Multiple Vitamin (ONE-A-DAY MENS) TABS Take 1 tablet by mouth daily      NONFORMULARY Nuretin: takes 2 daily      Multiple Vitamins-Minerals (ICAPS AREDS 2) CAPS Take by mouth 2 daily      warfarin (COUMADIN) 6 MG tablet Take 6 mg by mouth daily      metoprolol tartrate (LOPRESSOR) 50 MG tablet Take 50 mg by mouth 2 times daily       No current facility-administered medications for this visit. Assessment and plan:       Paroxysmal atrial fibrillation (HCC)   Will Schedule DCCV and if he goes back into afib discussion of antiarrythmic medications and ablation will be considered           He is symptomatic with shortness of breath and palpitations    - Afib risk factors including age, HTN, obesity, inactivity and DANO were discussed with patient. Risk factor modification recommended. All questions were answered. - Treatment options including cardioversion, rate control strategy, anticoagulation were discussed with patient.  Risks, benefits and alternative of each treatment options were explained. All questions answered. - pcp has placed him on Coumadin due to cost of Xarelto. He has had very good work up by his PCP     [id number] score at least 3. Continue warfarin  S/p ILR  The CIED was interrogated and programmed and I supervised and reviewed all the data. All findings and changes are in device interrogation sheat and reflect my personal interpretation and changes and is scanned to Epic. Afib burden 100%    Essential hypertension             - follows with pcp, on ARB, CCB, and BB   BP is well controlled. Continue current meds. Other hyperlipidemia             - follows with pcp. On statin   On statin    Sleep apnea              - newly diagnosed, using cpap nightly      Obesity   Diet and exercise    Plan  [facility]        Thank you for allowing me to participate in the care of Iva Ibarra. Further evaluation will be based upon the patient's clinical course and testing results. All questions and concerns were addressed to the patient/family. Alternatives to my treatment were discussed. I have discussed the above stated plan and the patient verbalized understanding and agreed with the plan. NOTE: This report was transcribed using voice recognition software. Every effort was made to ensure accuracy, however, inadvertent computerized transcription errors may be present.        Gonzalo Meyers MD, [facility] [address] [facility]   Office: [phone]

## 2019-05-31 NOTE — PROCEDURES
[address]     Electrophysiology Procedure Note       Date of Procedure: 5/31/2019  Patient's Name: Karl Lazaro  YOB: 1945   Medical Record Number: [id number]  Procedure Performed by: Fariba Aguilar MD    Procedures performed:  IV sedation. External Electrical cardioversion     Indication of the procedure: Persistent atrial fibrillation   details of procedure: The patient was brought to the cath lab area in a fasting and non-sedated state. The risks, benefits and alternatives of the procedure were discussed with the patient. The patient opted to proceed with the procedure. Written informed consent was signed and placed in the chart. A timeout protocol was completed to identify the patient and the procedure being performed. Patient is on chronic anticoagulation therapy. Then we used Brevital for sedation and electrical DC cardioversion was perfomred using 200J, synchronized shock. Patient was converted to sinus rhythm at 54 bpm. The patient tolerated the procedure well and there were no complications. Conclusion:   Successful external DC cardioversion of atrial fibrillation . Plan:   The patient can be discharged if remains stable. Will continue with medical therapy.

## 2019-06-25 ENCOUNTER — NURSE ONLY (OUTPATIENT)
Dept: CARDIOLOGY CLINIC | Age: 74
End: 2019-06-25
Payer: MEDICARE

## 2019-06-25 DIAGNOSIS — I48.0 PAROXYSMAL ATRIAL FIBRILLATION (HCC): ICD-10-CM

## 2019-06-25 DIAGNOSIS — Z45.09 ENCOUNTER FOR ELECTRONIC ANALYSIS OF REVEAL EVENT RECORDER: ICD-10-CM

## 2019-06-25 PROCEDURE — 93299 PR REM INTERROG ICPMS/SCRMS <30 D TECH REVIEW: CPT | Performed by: INTERNAL MEDICINE

## 2019-06-25 PROCEDURE — 93298 REM INTERROG DEV EVAL SCRMS: CPT | Performed by: INTERNAL MEDICINE

## 2019-07-05 ENCOUNTER — TELEPHONE (OUTPATIENT)
Dept: CARDIOLOGY CLINIC | Age: 74
End: 2019-07-05

## 2019-07-30 NOTE — PROGRESS NOTES
[facility]   Electrophysiology Follow Up   Date: 8/1/2019    Chief Complaint   Patient presents with    Atrial Fibrillation        HPI: Nidia Rider is a 68 y.o. seen today at the request of Dr Jose Guzman. He states he was seen at [facility] ER 2/23/19 with heart racing and elevated blood pressure and shortness of breath . He was in atrial fibrillation and placed on Metoprolol and Xarelto. He then saw his pcp who changed Xarelto to Coumadin due to cost. Other history includes hypertension, hyperlipidemia, diabetes, and sleep apnea. He is newly treated for sleep apnea with C-pap. He does not smoke. He is physically active. He recently moved here from [place].     4/29/19:  ILR Implanted  5/31/19   DCCV    Interval history: Today he remains in sinus rhythm. He feels better in sinus. He asked about different blood thinners. Past Medical History:   Diagnosis Date    [facility] disorder ([facility] [address]     Diabetes mellitus ([facility] [address] [facility]     Hyperlipidemia     Hypertension         History reviewed. No pertinent surgical history. Allergies: Allergies   Allergen Reactions    Amoxicillin Rash       Social History:   reports that he has never smoked. He has never used smokeless tobacco. He reports that he drinks about 3.0 standard drinks of alcohol per week. He reports that he does not use drugs. Family History:  family history includes Arrhythmia in his mother; Heart Attack in his maternal uncle; High Cholesterol in his father; Hypertension in his father. Reviewed. Denies family history of sudden cardiac death, arrhythmia, premature CAD    Review of System:  All other systems reviewed and are negative except for that noted above.  Pertinent negatives are:   General: negative for fever, chills   Ophthalmic ROS: negative for - eye pain or loss of vision  ENT ROS: negative for - headaches, sore throat   Respiratory: negative for - cough, sputum  Cardiovascular: Reviewed in HPI  Gastrointestinal: negative for - abdominal

## 2019-08-01 ENCOUNTER — OFFICE VISIT (OUTPATIENT)
Dept: CARDIOLOGY CLINIC | Age: 74
End: 2019-08-01
Payer: MEDICARE

## 2019-08-01 ENCOUNTER — NURSE ONLY (OUTPATIENT)
Dept: CARDIOLOGY CLINIC | Age: 74
End: 2019-08-01
Payer: MEDICARE

## 2019-08-01 VITALS
HEIGHT: 66 IN | HEART RATE: 46 BPM | BODY MASS INDEX: 36.32 KG/M2 | SYSTOLIC BLOOD PRESSURE: 135 MMHG | DIASTOLIC BLOOD PRESSURE: 73 MMHG | WEIGHT: 226 LBS

## 2019-08-01 DIAGNOSIS — Z45.09 ENCOUNTER FOR ELECTRONIC ANALYSIS OF REVEAL EVENT RECORDER: ICD-10-CM

## 2019-08-01 DIAGNOSIS — E78.2 MIXED HYPERLIPIDEMIA: ICD-10-CM

## 2019-08-01 DIAGNOSIS — I48.0 PAROXYSMAL ATRIAL FIBRILLATION (HCC): Primary | ICD-10-CM

## 2019-08-01 DIAGNOSIS — I48.0 PAROXYSMAL ATRIAL FIBRILLATION (HCC): ICD-10-CM

## 2019-08-01 DIAGNOSIS — E66.9 OBESITY (BMI 30-39.9): ICD-10-CM

## 2019-08-01 DIAGNOSIS — G47.33 OSA (OBSTRUCTIVE SLEEP APNEA): ICD-10-CM

## 2019-08-01 DIAGNOSIS — I10 BENIGN ESSENTIAL HTN: ICD-10-CM

## 2019-08-01 PROCEDURE — 1123F ACP DISCUSS/DSCN MKR DOCD: CPT | Performed by: INTERNAL MEDICINE

## 2019-08-01 PROCEDURE — 99214 OFFICE O/P EST MOD 30 MIN: CPT | Performed by: INTERNAL MEDICINE

## 2019-08-01 PROCEDURE — 93000 ELECTROCARDIOGRAM COMPLETE: CPT | Performed by: INTERNAL MEDICINE

## 2019-08-01 PROCEDURE — 3017F COLORECTAL CA SCREEN DOC REV: CPT | Performed by: INTERNAL MEDICINE

## 2019-08-01 PROCEDURE — 1036F TOBACCO NON-USER: CPT | Performed by: INTERNAL MEDICINE

## 2019-08-01 PROCEDURE — 4040F PNEUMOC VAC/ADMIN/RCVD: CPT | Performed by: INTERNAL MEDICINE

## 2019-08-01 PROCEDURE — G8427 DOCREV CUR MEDS BY ELIG CLIN: HCPCS | Performed by: INTERNAL MEDICINE

## 2019-08-01 PROCEDURE — 93291 INTERROG DEV EVAL SCRMS IP: CPT | Performed by: INTERNAL MEDICINE

## 2019-08-01 PROCEDURE — G8417 CALC BMI ABV UP PARAM F/U: HCPCS | Performed by: INTERNAL MEDICINE

## 2019-08-01 RX ORDER — METOPROLOL TARTRATE 50 MG/1
25 TABLET, FILM COATED ORAL 2 TIMES DAILY
Qty: 180 TABLET | Refills: 3 | Status: SHIPPED | OUTPATIENT
Start: 2019-08-01 | End: 2019-08-05 | Stop reason: DRUGHIGH

## 2019-08-01 NOTE — PROGRESS NOTES
Patient comes in for interrogation of his implanted loop recorder. Interrogation shows no AF since 4-29-19. He will see Dr. Amauri Jacobsen today.

## 2019-08-05 NOTE — TELEPHONE ENCOUNTER
Last ov 8/1/19  Pending appt 2/5/20  Last refill 8/1/19 #180x3        Metoprolol Tartrate 25 mg Oral 2 TIMES DAILY

## 2019-09-03 ENCOUNTER — NURSE ONLY (OUTPATIENT)
Dept: CARDIOLOGY CLINIC | Age: 74
End: 2019-09-03
Payer: MEDICARE

## 2019-09-03 DIAGNOSIS — Z45.09 ENCOUNTER FOR ELECTRONIC ANALYSIS OF REVEAL EVENT RECORDER: ICD-10-CM

## 2019-09-03 DIAGNOSIS — I48.0 PAROXYSMAL ATRIAL FIBRILLATION (HCC): ICD-10-CM

## 2019-09-03 PROCEDURE — 93299 PR REM INTERROG ICPMS/SCRMS <30 D TECH REVIEW: CPT | Performed by: INTERNAL MEDICINE

## 2019-09-03 PROCEDURE — 93298 REM INTERROG DEV EVAL SCRMS: CPT | Performed by: INTERNAL MEDICINE

## 2019-10-29 ENCOUNTER — NURSE ONLY (OUTPATIENT)
Dept: CARDIOLOGY CLINIC | Age: 74
End: 2019-10-29
Payer: MEDICARE

## 2019-10-29 DIAGNOSIS — I48.0 PAROXYSMAL ATRIAL FIBRILLATION (HCC): ICD-10-CM

## 2019-10-29 DIAGNOSIS — Z45.09 ENCOUNTER FOR ELECTRONIC ANALYSIS OF REVEAL EVENT RECORDER: ICD-10-CM

## 2019-10-29 PROCEDURE — 93299 PR REM INTERROG ICPMS/SCRMS <30 D TECH REVIEW: CPT | Performed by: INTERNAL MEDICINE

## 2019-10-29 PROCEDURE — 93298 REM INTERROG DEV EVAL SCRMS: CPT | Performed by: INTERNAL MEDICINE

## 2019-12-03 ENCOUNTER — NURSE ONLY (OUTPATIENT)
Dept: CARDIOLOGY CLINIC | Age: 74
End: 2019-12-03
Payer: MEDICARE

## 2019-12-03 DIAGNOSIS — I48.0 PAROXYSMAL ATRIAL FIBRILLATION (HCC): ICD-10-CM

## 2019-12-03 DIAGNOSIS — Z45.09 ENCOUNTER FOR ELECTRONIC ANALYSIS OF REVEAL EVENT RECORDER: ICD-10-CM

## 2019-12-03 PROCEDURE — 93299 PR REM INTERROG ICPMS/SCRMS <30 D TECH REVIEW: CPT | Performed by: INTERNAL MEDICINE

## 2019-12-03 PROCEDURE — 93298 REM INTERROG DEV EVAL SCRMS: CPT | Performed by: INTERNAL MEDICINE

## 2020-01-02 ENCOUNTER — NURSE ONLY (OUTPATIENT)
Dept: CARDIOLOGY CLINIC | Age: 75
End: 2020-01-02
Payer: MEDICARE

## 2020-01-02 NOTE — LETTER
[address] [phone]  [address]  [address] [address] [phone]    Pacemaker/Defibrillator Clinic          01/03/20        [address] [facility] [address]        Dear Diaz Valenzuela    This letter is to inform you that we received the transmission from your monitor at home that checks your implanted heart device. The next date your monitor will automatically transmit will be 3-9-20. If your report needs attention we will notify you. Your device and monitor are wireless and most transmit cellularly, but please periodically check your monitor is still plugged in to the electrical outlet. If you still use the telephone land line to send please ensure the connection to the phone joel is secure. This will help to ensure successful automatic transmissions in the future. Also, the monitor needs to be close to you while sleeping at night. Please be aware that the remote device transmission sites are periodically monitored only during regular business hours during which simultaneous in-office device clinics are being run. If your transmission requires attention, we will contact you as soon as possible. Thank you.             Sunny

## 2020-01-03 PROCEDURE — 93298 REM INTERROG DEV EVAL SCRMS: CPT | Performed by: INTERNAL MEDICINE

## 2020-01-30 NOTE — PROGRESS NOTES
[facility], [place], operated by [facility]   Electrophysiology Follow Up   Date: 2/5/2020    Chief Complaint   Patient presents with    Atrial Fibrillation     6 month f/u. No cardiac complaints.  Hypertension        HPI: Ann-Marie Mujica is a 76 y.o. seen today at the request of Dr Rosio Lake. He states he was seen at [facility] 2/23/19 with heart racing and elevated blood pressure and shortness of breath . He was in atrial fibrillation and placed on Metoprolol and Xarelto. He then saw his pcp who changed Xarelto to Coumadin due to cost. Other history includes hypertension, hyperlipidemia, diabetes, and sleep apnea. He is newly treated for sleep apnea with C-pap. He does not smoke. He is physically active. He recently moved here from [place].     4/29/19:  ILR Implanted  5/31/19   [facility]    Interval history:    Alvaro White presents to the office in routine follow up. His ILR interrogation shows a 13.9% afib burden. He states he can tell when he is in afib and feels anxious with these episodes. Will work on risk factor modification before deciding if he wants to proceed with the ablation. Past Medical History:   Diagnosis Date    Clotting disorder ([facility] [address]     Diabetes mellitus ([facility] [address]     Hyperlipidemia     Hypertension         History reviewed. No pertinent surgical history. Allergies: Allergies   Allergen Reactions    Amoxicillin Rash       Social History:   reports that he has never smoked. He has never used smokeless tobacco. He reports current alcohol use of about 3.0 standard drinks of alcohol per week. He reports that he does not use drugs. Family History:  family history includes Arrhythmia in his mother; Heart Attack in his maternal uncle; High Cholesterol in his father; Hypertension in his father. Reviewed. Denies family history of sudden cardiac death, arrhythmia, premature CAD    Review of System:  All other systems reviewed and are negative except for that noted above.  Pertinent negatives are:   General: negative for fever, chills   Ophthalmic ROS: negative for - eye pain or loss of vision  ENT ROS: negative for - headaches, sore throat   Respiratory: negative for - cough, sputum  Cardiovascular: Reviewed in HPI  Gastrointestinal: negative for - abdominal pain, diarrhea, N/V  Hematology: negative for - bleeding, blood clots, bruising or jaundice  Genito-Urinary:  negative for - Dysuria or incontinence  Musculoskeletal: negative for - Joint swelling, muscle pain  Neurological: negative for - confusion, dizziness, headaches   Psychiatric: No anxiety, no depression. Dermatological: negative for - rash    Physical Examination:  Vitals:    20 1340   BP: 136/74   Pulse: Wt Readings from Last 3 Encounters:   20 232 lb (105.2 kg)   19 226 lb (102.5 kg)   19 231 lb (104.8 kg)       · Constitutional: Oriented. No distress. obese  · Head: Normocephalic and atraumatic. · Mouth/Throat: Oropharynx is clear and moist.   · Eyes: Conjunctivae normal. EOM are normal.   · Neck: Neck supple. No rigidity. No JVD present. · Cardiovascular: Normal rate, irregular rhythm, S1&S2. · Pulmonary/Chest: Bilateral respiratory sounds. No wheezes, No rhonchi. · Abdominal: Soft. Bowel sounds present. No distension, No tenderness. · Musculoskeletal: No tenderness. No edema    · Lymphadenopathy: Has no cervical adenopathy. · Neurological: Alert and oriented. Cranial nerve appears intact, No Gross deficit   · Skin: Skin is warm and dry. No rash noted. · Psychiatric: Has a normal behavior       Labs, diagnostic and imaging results reviewed. Reviewed. Lab Results   Component Value Date    CREATININE 0.9 2019       EC20    Sinus Bradycardia    Nuclear Stress 19  Summary    Normal LV size with hyperdynamic systolic function.    Left ventricular ejection fraction of 73 %.    There is normal isotope uptake at stress and rest.    There is no evidence of myocardial ischemia or scar.        Echo: 3/1/19: - follows with pcp, on ARB, CCB, and BB   - Home BP monitoring encourage with a BP goal <130/80      Other hyperlipidemia             - follows with pcp. On statin   On statin    Sleep apnea                using cpap nightly      Obesity   Body mass index is 36.88 kg/m². Diet and exercise    Plan: Will do risk factor modification especially weight reduction  Follow up 7/2020        Thank you for allowing me to participate in the care of Ben Morgan. Further evaluation will be based upon the patient's clinical course and testing results. All questions and concerns were addressed to the patient/family. Alternatives to my treatment were discussed. I have discussed the above stated plan and the patient verbalized understanding and agreed with the plan. NOTE: This report was transcribed using voice recognition software. Every effort was made to ensure accuracy, however, inadvertent computerized transcription errors may be present. Vijya Martinez MD, Wiliam Reyes [address] [place] [address]   Office: [phone]   Scribe attestation:  This note was scribed in the presence of Vijay Martinez MD by Ani Garcia RN    I, Dr. Vijay Martinez personally performed the services described in this documentation as scribed by RN in my presence, and it is both accurate and complete.

## 2020-02-05 ENCOUNTER — OFFICE VISIT (OUTPATIENT)
Dept: CARDIOLOGY CLINIC | Age: 75
End: 2020-02-05
Payer: MEDICARE

## 2020-02-05 ENCOUNTER — NURSE ONLY (OUTPATIENT)
Dept: CARDIOLOGY CLINIC | Age: 75
End: 2020-02-05
Payer: MEDICARE

## 2020-02-05 VITALS
HEIGHT: 67 IN | DIASTOLIC BLOOD PRESSURE: 74 MMHG | WEIGHT: 232 LBS | SYSTOLIC BLOOD PRESSURE: 136 MMHG | BODY MASS INDEX: 36.41 KG/M2 | HEART RATE: 51 BPM

## 2020-02-05 PROCEDURE — G8417 CALC BMI ABV UP PARAM F/U: HCPCS | Performed by: INTERNAL MEDICINE

## 2020-02-05 PROCEDURE — G8427 DOCREV CUR MEDS BY ELIG CLIN: HCPCS | Performed by: INTERNAL MEDICINE

## 2020-02-05 PROCEDURE — G8484 FLU IMMUNIZE NO ADMIN: HCPCS | Performed by: INTERNAL MEDICINE

## 2020-02-05 PROCEDURE — 1036F TOBACCO NON-USER: CPT | Performed by: INTERNAL MEDICINE

## 2020-02-05 PROCEDURE — 93291 INTERROG DEV EVAL SCRMS IP: CPT | Performed by: INTERNAL MEDICINE

## 2020-02-05 PROCEDURE — 4040F PNEUMOC VAC/ADMIN/RCVD: CPT | Performed by: INTERNAL MEDICINE

## 2020-02-05 PROCEDURE — 99214 OFFICE O/P EST MOD 30 MIN: CPT | Performed by: INTERNAL MEDICINE

## 2020-02-05 PROCEDURE — 3017F COLORECTAL CA SCREEN DOC REV: CPT | Performed by: INTERNAL MEDICINE

## 2020-02-05 PROCEDURE — 1123F ACP DISCUSS/DSCN MKR DOCD: CPT | Performed by: INTERNAL MEDICINE

## 2020-02-05 RX ORDER — WARFARIN SODIUM 6 MG/1
5 TABLET ORAL
COMMUNITY
End: 2021-09-15

## 2020-02-05 RX ORDER — METOPROLOL TARTRATE 50 MG/1
50 TABLET, FILM COATED ORAL 2 TIMES DAILY
COMMUNITY
End: 2020-11-18 | Stop reason: SDUPTHER

## 2020-02-05 NOTE — PROGRESS NOTES
[facility]   Pulmonary Clinic Follow Up Note  April 30, 2018      Shayne Shoemaker MRN# [id number]   Age: 56 year old YOB: 1962     Date of Consultation: April 30, 2018    Primary care provider: Christos Carpio     History taken from; Patient      Shayne Shoemaker is a 56 year old male seen in the Pulmonary Clinic  for f/u.  Chief Complaint   Patient presents with     Transplant Evaluation     Lung Eval    .          Pulmonary Problem List / Reason for follow up:   1. IPF           Assessment and Plan:        ASSESSMENT AND PLAN:  The patient is a 56-year-old gentleman with history of IPF, coming for a followup visit.  The patient is currently going through the lung transplant evaluation.   1.  Idiopathic pulmonary fibrosis.  The patient had positive ALAN.  We will repeat ALAN this week.  We will start the patient on Prilosec 40 mg daily.  The patient will have followup with pulmonary function test in 2 months.  The patient will need liver function tests next month and we will evaluate the patient progression on the pulmonary function test during the evaluation this week.   2.  Lung transplantation.  The patient is found to have low vitamin D, so we will start replacement with vitamin D and the patient had 50 RBCs on the urinalysis, so we will do the kidney ultrasound for the patient.      We explained the plan to the patient including the risks and benefits.  The patient expressed understanding and agreed with the plan.      Thank you very much for the opportunity to participate in the care of this very pleasant patient.         Return visit in 2 months      Chace Castillo M.D.         History of Present Illness / Interval History:     CHIEF COMPLAINT:  IPF.      HISTORY OF PRESENT ILLNESS:  The patient is a 56-year-old gentleman with history of IPF, coming for the followup visit.  The patient is coming for the lung transplant evaluation.  The patient is  Patient comes in for interrogation of their implanted loop recorder. Interrogation shows 9 episodes of AF with a 13.9% burden. Last on 1/10/2020, longest >99:59:59 with a Max V rate of 333 bpm. Patient remains on warfarin. 1 pause episode on 10/28/2019 lasting 4 seconds during sleep time hours. Patient will see Dr. Sandi Jane today. feeling that he is progressing.  The patient is currently on OFEV.  The patient has a lot of diarrhea and even more dyspepsia on OFEV.  The patient is on ranitidine and it does not seem to help him a lot.                 Pulmonary Data:         Exposure history: Asbestos;  No , TB;  No , Radiation;   No          Medications:     Current Outpatient Prescriptions   Medication Sig     buPROPion (WELLBUTRIN SR) 150 MG 12 hr tablet Take 150 mg by mouth daily      omeprazole (PRILOSEC) 20 MG CR capsule Take 2 capsules (40 mg) by mouth daily     order for DME Oxygen for home use. Liters per minute: 2 per nc continuous with activty and at hs. Length of need: 99  Months.     order for DME Equipment being ordered: Oxygen 2 liters at rest, 8 liters with activity (or 6 liters with oximizer tubing).  Consider liquid oxygen.  Requires portability.     order for DME Equipment being ordered: Oxygen--please obtain overnight oximetry on 2 liters oxygen.  Please fax results to [phone].     ranitidine (ZANTAC) 75 MG tablet Take 2 tablets (150 mg) by mouth 2 times daily     No current facility-administered medications for this visit.              Past Medical History:     Past Medical History:   Diagnosis Date     ILD (interstitial lung disease) (H)     Lung biopsy c/w UIP, CT c/w HP      Sleep apnea              Past Surgical History:     Past Surgical History:   Procedure Laterality Date     ANKLE SURGERY  10-12 yrs ago     ARTHROSCOPY KNEE      3-4 total,      COLONOSCOPY       KNEE SURGERY  approx 2012    ACL     NECK SURGERY  5-7 yrs ago    [facility], ruptured disc, cleaned up      THORACOSCOPIC BIOPSY LUNG Right 11/30/2017                  Social History:     Social History     Social History     Marital status:      Spouse name: N/A     Number of children: N/A     Years of education: N/A     Occupational History     Not on file.     Social History Main Topics     Smoking status: Former Smoker     Packs/day: 1.00      Years: 38.00     Types: Cigarettes     Quit date: 11/1/2017     Smokeless tobacco: Never Used     Alcohol use Yes      Comment: stated cocktail and beer occ     Drug use: No     Sexual activity: Not on file     Other Topics Concern     Not on file     Social History Narrative             Family History:     Family History   Problem Relation Age of Onset     DIABETES Mother      HEART DISEASE Father      Prostate Cancer Maternal Grandfather              Immunization:     There is no immunization history on file for this patient.           Review of Systems:   I have done 10 points of review systems and pertinent findings are as above, otherwise negative.             Physical Examination:   General: Alert, oriented, not in distress  B/P: 139/83, T: Data Unavailable, P: 87, R: 20  HEENT: neck supple, symmetrical, no lymph node enlargement, thyromegaly, bruit, JVD, pupils are isocoric and equally responsive to the light.   Lungs: both hemithoraces are symmetrical, normal to palpation, no dullness to percussion, auscultation of lungs revealed bibasilar crackles.  CVS: Normal S1 and S2, no additional heart sounds, murmur, rub, normal peripheral pulses  Abdomen: Bowel sounds present, soft, non tender, non distended, no organomegaly, ascitis, mass  Extremities/musculoskeletal: no peripheral edema, deformity, cyanosis, clubbing  Neurology: alert, orientedx3, no motor/sensorial deficits, cranial nerves grossly normal  Skin: no rash  Psychiatry: Mood and affect are appropriate             DATA:     CBC RESULTS:     Recent Labs   Lab Test  04/30/18   0856   WBC  13.7*   RBC  4.97   HGB  16.2   HCT  45.8   PLT  181       Basic Metabolic Panel:  Recent Labs   Lab Test  04/30/18   0856   NA  142   POTASSIUM  4.0   CHLORIDE  108   CO2  26   BUN  12   CR  0.86   GLC  88   LACIE  8.9       INR  Recent Labs   Lab Test  04/30/18   0856   INR  1.03        PFT   PFT Latest Ref Rng & Units 2/9/2018   FVC L 2.09   FEV1 L 1.79   FVC% % 41    FEV1% % 45             Thank you for allowing me participate in the care of Shayne Shoemaker.    Chace Castillo M.D.  Associate Professor of Medicine  Pulmonary, Allergy, Critical Care and Sleep

## 2020-03-08 PROCEDURE — 93298 REM INTERROG DEV EVAL SCRMS: CPT | Performed by: INTERNAL MEDICINE

## 2020-03-08 PROCEDURE — G2066 INTER DEVC REMOTE 30D: HCPCS | Performed by: INTERNAL MEDICINE

## 2020-03-09 ENCOUNTER — NURSE ONLY (OUTPATIENT)
Dept: CARDIOLOGY CLINIC | Age: 75
End: 2020-03-09
Payer: MEDICARE

## 2020-03-09 NOTE — LETTER
[address] [phone]  [address]  [address] [address] [phone]    Pacemaker/Defibrillator Clinic          03/08/20        [address] [facility] [address]        Dear eFrnanda Martin    This letter is to inform you that we received the transmission from your monitor at home that checks your implanted heart device. The next date your monitor will automatically transmit will be 4-13-20. If your report needs attention we will notify you. Your device and monitor are wireless and most transmit cellularly, but please periodically check your monitor is still plugged in to the electrical outlet. If you still use the telephone land line to send please ensure the connection to the phone joel is secure. This will help to ensure successful automatic transmissions in the future. Also, the monitor needs to be close to you while sleeping at night. Please be aware that the remote device transmission sites are periodically monitored only during regular business hours during which simultaneous in-office device clinics are being run. If your transmission requires attention, we will contact you as soon as possible. Thank you.             Lillian [address]

## 2020-04-13 ENCOUNTER — NURSE ONLY (OUTPATIENT)
Dept: CARDIOLOGY CLINIC | Age: 75
End: 2020-04-13
Payer: MEDICARE

## 2020-04-13 PROCEDURE — 93298 REM INTERROG DEV EVAL SCRMS: CPT | Performed by: INTERNAL MEDICINE

## 2020-04-13 PROCEDURE — G2066 INTER DEVC REMOTE 30D: HCPCS | Performed by: INTERNAL MEDICINE

## 2020-04-13 NOTE — LETTER
[address] [facility] [phone]  [address]  [address] [address] [phone]    Pacemaker/Defibrillator Clinic          04/13/20        [address], [facility] [address]        Dear Tigre Aldana    This letter is to inform you that we received the transmission from your monitor at home that checks your implanted heart device. The next date your monitor will automatically transmit will be 5-18-20. If your report needs attention we will notify you. Your device and monitor are wireless and most transmit cellularly, but please periodically check your monitor is still plugged in to the electrical outlet. If you still use the telephone land line to send please ensure the connection to the phone joel is secure. This will help to ensure successful automatic transmissions in the future. Also, the monitor needs to be close to you while sleeping at night. Please be aware that the remote device transmission sites are periodically monitored only during regular business hours during which simultaneous in-office device clinics are being run. If your transmission requires attention, we will contact you as soon as possible. Thank you.             Sunny

## 2020-05-18 ENCOUNTER — NURSE ONLY (OUTPATIENT)
Dept: CARDIOLOGY CLINIC | Age: 75
End: 2020-05-18
Payer: MEDICARE

## 2020-05-18 PROCEDURE — G2066 INTER DEVC REMOTE 30D: HCPCS | Performed by: INTERNAL MEDICINE

## 2020-05-18 PROCEDURE — 93298 REM INTERROG DEV EVAL SCRMS: CPT | Performed by: INTERNAL MEDICINE

## 2020-05-18 NOTE — PROGRESS NOTES
[facility] remote Linq report shows known AF. Pt remains on coumadin. We will continue to monitor remotely.

## 2020-05-18 NOTE — LETTER
[facility] [facility] [phone]  [address]   [address] [address] [phone]    Pacemaker/Defibrillator Clinic          05/18/20        Larry Loaiza  [facility] [address]        Dear Larry Loaiza    This letter is to inform you that we received the transmission from your monitor at home that checks your implanted heart device. The next date your monitor will automatically transmit will be 6-22-20. If your report needs attention we will notify you. Your device and monitor are wireless and most transmit cellularly, but please periodically check your monitor is still plugged in to the electrical outlet. If you still use the telephone land line to send please ensure the connection to the phone joel is secure. This will help to ensure successful automatic transmissions in the future. Also, the monitor needs to be close to you while sleeping at night. Please be aware that the remote device transmission sites are periodically monitored only during regular business hours during which simultaneous in-office device clinics are being run. If your transmission requires attention, we will contact you as soon as possible. Thank you.             Lillian Painter

## 2020-06-22 ENCOUNTER — NURSE ONLY (OUTPATIENT)
Dept: CARDIOLOGY CLINIC | Age: 75
End: 2020-06-22
Payer: MEDICARE

## 2020-06-22 PROCEDURE — G2066 INTER DEVC REMOTE 30D: HCPCS | Performed by: INTERNAL MEDICINE

## 2020-06-22 PROCEDURE — 93298 REM INTERROG DEV EVAL SCRMS: CPT | Performed by: INTERNAL MEDICINE

## 2020-06-22 NOTE — PROGRESS NOTES
[facility] remote Linq report shows known AF, pt remains on coumadin. We will continue to monitor remotely.

## 2020-07-08 ENCOUNTER — OFFICE VISIT (OUTPATIENT)
Dept: CARDIOLOGY CLINIC | Age: 75
End: 2020-07-08
Payer: MEDICARE

## 2020-07-08 ENCOUNTER — NURSE ONLY (OUTPATIENT)
Dept: CARDIOLOGY CLINIC | Age: 75
End: 2020-07-08
Payer: MEDICARE

## 2020-07-08 VITALS
BODY MASS INDEX: 37.2 KG/M2 | SYSTOLIC BLOOD PRESSURE: 132 MMHG | HEART RATE: 103 BPM | WEIGHT: 237 LBS | DIASTOLIC BLOOD PRESSURE: 76 MMHG | RESPIRATION RATE: 20 BRPM | HEIGHT: 67 IN

## 2020-07-08 PROCEDURE — 1123F ACP DISCUSS/DSCN MKR DOCD: CPT | Performed by: INTERNAL MEDICINE

## 2020-07-08 PROCEDURE — 99215 OFFICE O/P EST HI 40 MIN: CPT | Performed by: INTERNAL MEDICINE

## 2020-07-08 PROCEDURE — 93291 INTERROG DEV EVAL SCRMS IP: CPT | Performed by: INTERNAL MEDICINE

## 2020-07-08 PROCEDURE — 1036F TOBACCO NON-USER: CPT | Performed by: INTERNAL MEDICINE

## 2020-07-08 PROCEDURE — G8417 CALC BMI ABV UP PARAM F/U: HCPCS | Performed by: INTERNAL MEDICINE

## 2020-07-08 PROCEDURE — G8427 DOCREV CUR MEDS BY ELIG CLIN: HCPCS | Performed by: INTERNAL MEDICINE

## 2020-07-08 PROCEDURE — 4040F PNEUMOC VAC/ADMIN/RCVD: CPT | Performed by: INTERNAL MEDICINE

## 2020-07-08 PROCEDURE — 3017F COLORECTAL CA SCREEN DOC REV: CPT | Performed by: INTERNAL MEDICINE

## 2020-07-08 RX ORDER — FLUTICASONE PROPIONATE 50 MCG
1 SPRAY, SUSPENSION (ML) NASAL DAILY
COMMUNITY

## 2020-07-08 NOTE — PROGRESS NOTES
[facility]   Electrophysiology Follow Up   Date: 7/8/2020    Chief Complaint   Patient presents with    Follow-up     5 month    Atrial Fibrillation    Fatigue    Irregular Heart Beat        HPI: Carlotta Rouse is a 76 y.o. seen today at the request of Dr Yamil Li. He states he was seen at [facility] 2/23/19 with heart racing and elevated blood pressure and shortness of breath . He was in atrial fibrillation and placed on Metoprolol and Xarelto. He then saw his pcp who changed Xarelto to Coumadin due to cost. Other history includes hypertension, hyperlipidemia, diabetes, and sleep apnea. He is newly treated for sleep apnea with C-pap. He does not smoke. He is physically active. He recently moved here from [place].     4/29/19:  ILR Implanted  5/31/19   [facility]    Interval history:     he has been in Atrial fibrillation for 2 months and not feeling well. Past Medical History:   Diagnosis Date    Cl[facility] disorder ([facility] [address]     Diabetes mellitus ([facility] [address]     Hyperlipidemia     Hypertension         No past surgical history on file. Allergies: Allergies   Allergen Reactions    Amoxicillin Rash       Social History:   reports that he has never smoked. He has never used smokeless tobacco. He reports current alcohol use of about 3.0 standard drinks of alcohol per week. He reports that he does not use drugs. Family History:  family history includes Arrhythmia in his mother; Heart Attack in his maternal uncle; High Cholesterol in his father; Hypertension in his father. Reviewed. Denies family history of sudden cardiac death, arrhythmia, premature CAD    Review of System:  All other systems reviewed and are negative except for that noted above.  Pertinent negatives are:   General: negative for fever, chills   Ophthalmic ROS: negative for - eye pain or loss of vision  ENT ROS: negative for - headaches, sore throat   Respiratory: negative for - cough, sputum  Cardiovascular: Reviewed in HPI  Gastrointestinal: negative for - abdominal pain, diarrhea, N/V  Hematology: negative for - bleeding, blood clots, bruising or jaundice  Genito-Urinary:  negative for - Dysuria or incontinence  Musculoskeletal: negative for - Joint swelling, muscle pain  Neurological: negative for - confusion, dizziness, headaches   Psychiatric: No anxiety, no depression. Dermatological: negative for - rash    Physical Examination:  Vitals:    20 1349   BP: 132/76   Pulse: 103   Resp: 20      Wt Readings from Last 3 Encounters:   20 237 lb (107.5 kg)   20 232 lb (105.2 kg)   19 226 lb (102.5 kg)       · Constitutional: Oriented. No distress. obese  · Head: Normocephalic and atraumatic. · Mouth/Throat: Oropharynx is clear and moist.   · Eyes: Conjunctivae normal. EOM are normal.   · Neck: Neck supple. No rigidity. No JVD present. · Cardiovascular: Normal rate, irregular rhythm, S1&S2. · Pulmonary/Chest: Bilateral respiratory sounds. No wheezes, No rhonchi. · Abdominal: Soft. Bowel sounds present. No distension, No tenderness. · Musculoskeletal: No tenderness. No edema    · Lymphadenopathy: Has no cervical adenopathy. · Neurological: Alert and oriented. Cranial nerve appears intact, No Gross deficit   · Skin: Skin is warm and dry. No rash noted. · Psychiatric: Has a normal behavior       Labs, diagnostic and imaging results reviewed. Reviewed. Lab Results   Component Value Date    CREATININE 0.9 2019       EC20    Afib    Nuclear Stress 19  Summary    Normal LV size with hyperdynamic systolic function.    Left ventricular ejection fraction of 73 %.    There is normal isotope uptake at stress and rest.    There is no evidence of myocardial ischemia or scar. Echo: 3/1/19:   EF 50-55%. The left ventricular function is low normal.  There is mild global hypokinesis of the left ventricle. The diastolic function is impaired and classified as Grade 2  (psuedonormalization).   The left atrium anticoagulation discussed. High risk for stroke and thromboembolism. Anticoagulation is recommended. I discussed anticoagulation to decrease the risk of thromboembolic events including stroke. Benefits and alternatives were discussed with patient. Risk of bleeding was discussed. Patient verbalized understanding. On Coumadin d/t cost     The risks, benefits and alternatives of the ablation procedure were discussed with the patient. The risks including, but not limited to, the risks of bleeding, infection, radiation exposure, injury to vascular, cardiac and surrounding structures (including pneumothorax), stroke, cardiac perforation, tamponade, need for emergent open heart surgery, need for pacemaker implantation, Injury to the phrenic nerve, injury to the esophagus, myocardial infarction and death were discussed in detail. The patient opted to proceed with the ablation. S/p ILR  The CIED was interrogated and programmed and I supervised and reviewed all the data. All findings and changes are in device interrogation sheat and reflect my personal interpretation and changes and is scanned to Epic. Atrial fibrillation since May 2020, 42.5% overall  HTN  Vitals:    07/08/20 1349   BP: 132/76   Pulse: 103   Resp: 20     -Home BP monitoring encourage with a BP goal <130/80      Other hyperlipidemia             - follows with pcp. On statin   On statin    Sleep apnea                using cpap nightly      Obesity  Body mass index is 37.68 kg/m². - Excessive weight is complicating assessment and treatment. It is placing patient at risk for multiple co-morbidities as well as early death and contributing to the patient's presentation. - discussed weight management with diet and exercise          Plan:    Cardioversion next week       Atrial fibrillation ablation to be scheduled        Thank you for allowing me to participate in the care of Shannan Schroeder.   Further evaluation will be based upon the patient's clinical course and testing results. All questions and concerns were addressed to the patient/family. Alternatives to my treatment were discussed. I have discussed the above stated plan and the patient verbalized understanding and agreed with the plan. NOTE: This report was transcribed using voice recognition software. Every effort was made to ensure accuracy, however, inadvertent computerized transcription errors may be present. Asha Baird MD, [address]   Office: [phone]    Scribe attestation:  This note was scribed in the presence of Asha Baird MD by Isbaella Rollins RN    I, Dr. Asha Baird personally performed the services described in this documentation as scribed by RN in my presence, and it is both accurate and complete.

## 2020-07-08 NOTE — LETTER
[facility]  [facility] Procedure Sheet    7/8/20  Rivka Messer  1945  EP Procedures     Pacemaker implant (single/dual)  EP Study    ICD implant (single/dual)  Atrial flutter ablation (LISSA Y/N)    Biv implant ICD  Tilt Table    Biv implant PPM xxxx Atrial fibrillation ablation (LISSA Yes)    Generator Change (PPM/ICD/BiV)  SVT ablation    Lead revision (RV/LA/RA) (<1 month)  VT ablation      Lead extraction +/- upgrade (BiV/PPM/ICD)  VT Ischemic/ non-ischemic    Loop implant/ removal  VT RVOT   xxx Cardioversion ASAP  VT Left sided    LISSA  AVN ablation     Equipment     [employer]   CHRISTELLE Mapping System    [employer] xxx Carto Mapping System    [employer]  CryoAblation    Biotronik  Laser Lead Extraction     EP Procedures Scheduling Request    # hours Requested   Scheduled  Date:   Specific Day  Completed    Anesthesia Yes F/u Date:   CT surgery backup      Overnight stay YEs     Location [facility]       Pre-Procedure Labs / Imaging     PT/INR  Type & cross    CBC  Units PRBC    BMP/Mg  Units FFP    Venogram  CXR    Echo  Cardiac CTA for Pulmonary vein mapping     RN INITIALS: RA    Patient Instructions  Do not eat or drink after midnight the night prior to procedure  Dx: Afib   for INR done at his PCP  6/23/2020 INR 2.8  4/21/2020 INR 1.9

## 2020-07-09 ENCOUNTER — OFFICE VISIT (OUTPATIENT)
Dept: PRIMARY CARE CLINIC | Age: 75
End: 2020-07-09
Payer: MEDICARE

## 2020-07-09 PROCEDURE — 99211 OFF/OP EST MAY X REQ PHY/QHP: CPT | Performed by: NURSE PRACTITIONER

## 2020-07-09 NOTE — PROGRESS NOTES
Ivon Marie received a viral test for COVID-19. They were educated on isolation and quarantine as appropriate. For any symptoms, they were directed to seek care from their PCP, given contact information to establish with a doctor, directed to an urgent care or the emergency room.

## 2020-07-09 NOTE — PATIENT INSTRUCTIONS

## 2020-07-13 LAB
SARS-COV-2: NOT DETECTED
SOURCE: NORMAL

## 2020-07-15 ENCOUNTER — HOSPITAL ENCOUNTER (OUTPATIENT)
Dept: CARDIAC CATH/INVASIVE PROCEDURES | Age: 75
Discharge: HOME | End: 2020-07-15
Attending: INTERNAL MEDICINE | Admitting: INTERNAL MEDICINE
Payer: MEDICARE

## 2020-07-15 ENCOUNTER — TELEPHONE (OUTPATIENT)
Dept: CARDIOLOGY CLINIC | Age: 75
End: 2020-07-15

## 2020-07-15 VITALS
WEIGHT: 237 LBS | HEART RATE: 72 BPM | BODY MASS INDEX: 39.49 KG/M2 | HEIGHT: 65 IN | SYSTOLIC BLOOD PRESSURE: 147 MMHG | TEMPERATURE: 97.9 F | DIASTOLIC BLOOD PRESSURE: 82 MMHG | RESPIRATION RATE: 16 BRPM

## 2020-07-15 LAB — INR BLD: 2.8 (ref 0.86–1.14)

## 2020-07-15 PROCEDURE — 92960 CARDIOVERSION ELECTRIC EXT: CPT

## 2020-07-15 PROCEDURE — 99152 MOD SED SAME PHYS/QHP 5/>YRS: CPT | Performed by: INTERNAL MEDICINE

## 2020-07-15 PROCEDURE — 2500000003 HC RX 250 WO HCPCS

## 2020-07-15 PROCEDURE — 7100000010 HC PHASE II RECOVERY - FIRST 15 MIN

## 2020-07-15 PROCEDURE — 92960 CARDIOVERSION ELECTRIC EXT: CPT | Performed by: INTERNAL MEDICINE

## 2020-07-15 PROCEDURE — 85610 PROTHROMBIN TIME: CPT

## 2020-07-15 NOTE — PROCEDURES
[address]     Electrophysiology Procedure Note       Date of Procedure: 7/15/2020  Patient's Name: Suzanne Koehler  YOB: 1945   Medical Record Number: [id number]  Procedure Performed by: Saige Arias MD    Procedures performed:  IV sedation. External Electrical cardioversion   Mallampati3  ASA 3    Indication of the procedure: Persistent atrial fibrillation      Details of procedure: The patient was brought to the cath lab area in a fasting and non-sedated state. The risks, benefits and alternatives of the procedure were discussed with the patient. The patient opted to proceed with the procedure. Written informed consent was signed and placed in the chart. A timeout protocol was completed to identify the patient and the procedure being performed. I pushed medications  . We monitored the patient's level of consciousness and vital signs/physiologic status throughout the procedure duration (see start and stop times below). Sedation:  50 mg Brevital   Sedation start: 0920  Sedation stop: 0937      Patient is on chronic anticoagulation therapy. Then we used Brevital for sedation and electrical DC cardioversion was perfomred using 200J, synchronized shock. Patient was converted to sinus rhythm at 50 bpm. The patient tolerated the procedure well and there were no complications. Conclusion:   Successful external DC cardioversion of atrial fibrillation . Plan:   The patient can be discharged if remains stable. Will continue with medical therapy.    Schedule ablation

## 2020-07-15 NOTE — TELEPHONE ENCOUNTER
----- Message from Katherleen Leyden, MD sent at 7/15/2020  9:27 AM EDT -----  Please schedule him for Atrial fibrillation ablation, carto

## 2020-07-27 ENCOUNTER — TELEPHONE (OUTPATIENT)
Dept: CARDIOLOGY CLINIC | Age: 75
End: 2020-07-27

## 2020-07-27 NOTE — TELEPHONE ENCOUNTER
Called pt. He is worried about the ablation and its' risks. We discussed treatment options including antiarrhythmics, cardioversion, rate control with anticoagulation, and ablation in detail with patient. We discussed progressive nature of atrial fibrillation    Atrial fibrillation ablation procedure has been discussed with patient. Risk, benefit, alternative, and rationale of the procedure has been discussed with the patient. I have explained that ablation therapy is symptoms driven and alternative such as anticoagulation and rate control can be considered. We discussed the possible need for repeat procedure. On average patients need more than one ablation procedure     All questions answered. Pt agrees to proceed with ablation as scheduled in October.     Leonardo aZldivar, APRN-CNP

## 2020-07-27 NOTE — TELEPHONE ENCOUNTER
Pt states he is scheduled for ablation on 10/5/20, but is asking if there is drug therapy he could try that could help.  Please call to discuss

## 2020-08-10 ENCOUNTER — NURSE ONLY (OUTPATIENT)
Dept: CARDIOLOGY CLINIC | Age: 75
End: 2020-08-10
Payer: MEDICARE

## 2020-08-10 PROCEDURE — G2066 INTER DEVC REMOTE 30D: HCPCS | Performed by: INTERNAL MEDICINE

## 2020-08-10 PROCEDURE — 93298 REM INTERROG DEV EVAL SCRMS: CPT | Performed by: INTERNAL MEDICINE

## 2020-08-10 NOTE — PROGRESS NOTES
We received a remote transmission form patient's monitor at home. Remote Linq report shows AF. Pt remains on coumadin. EP physician to review. We will continue to monitor remotely.

## 2020-08-10 NOTE — LETTER
[address] [facility] [phone]  [address]  [address] [address] [phone]    Pacemaker/Defibrillator Clinic          08/10/20        [address], [facility] [address]        Dear Suzanne Koehler    This letter is to inform you that we received the transmission from your monitor at home that checks your implanted heart device. The next date your monitor will automatically transmit will be 9-14-20. If your report needs attention we will notify you. Your device and monitor are wireless and most transmit cellularly, but please periodically check your monitor is still plugged in to the electrical outlet. If you still use the telephone land line to send please ensure the connection to the phone joel is secure. This will help to ensure successful automatic transmissions in the future. Also, the monitor needs to be close to you while sleeping at night. Please be aware that the remote device transmission sites are periodically monitored only during regular business hours during which simultaneous in-office device clinics are being run. If your transmission requires attention, we will contact you as soon as possible. Thank you.             Sunny

## 2020-09-14 ENCOUNTER — NURSE ONLY (OUTPATIENT)
Dept: CARDIOLOGY CLINIC | Age: 75
End: 2020-09-14
Payer: MEDICARE

## 2020-09-14 PROCEDURE — 93298 REM INTERROG DEV EVAL SCRMS: CPT | Performed by: INTERNAL MEDICINE

## 2020-09-14 PROCEDURE — G2066 INTER DEVC REMOTE 30D: HCPCS | Performed by: INTERNAL MEDICINE

## 2020-09-14 NOTE — LETTER
[address] [employer] [phone]  [address]  [address] [address] [phone]    Pacemaker/Defibrillator Clinic          09/14/20        [address], [facility] [address]        Dear Paulla Bence    This letter is to inform you that we received the transmission from your monitor at home that checks your implanted heart device. The next date your monitor will automatically transmit will be 10-19-20. If your report needs attention we will notify you. Your device and monitor are wireless and most transmit cellularly, but please periodically check your monitor is still plugged in to the electrical outlet. If you still use the telephone land line to send please ensure the connection to the phone joel is secure. This will help to ensure successful automatic transmissions in the future. Also, the monitor needs to be close to you while sleeping at night. Please be aware that the remote device transmission sites are periodically monitored only during regular business hours during which simultaneous in-office device clinics are being run. If your transmission requires attention, we will contact you as soon as possible. Thank you.             [address]

## 2020-09-29 ENCOUNTER — OFFICE VISIT (OUTPATIENT)
Dept: PRIMARY CARE CLINIC | Age: 75
End: 2020-09-29
Payer: MEDICARE

## 2020-09-29 PROCEDURE — G8417 CALC BMI ABV UP PARAM F/U: HCPCS | Performed by: NURSE PRACTITIONER

## 2020-09-29 PROCEDURE — G8428 CUR MEDS NOT DOCUMENT: HCPCS | Performed by: NURSE PRACTITIONER

## 2020-09-29 PROCEDURE — 99211 OFF/OP EST MAY X REQ PHY/QHP: CPT | Performed by: NURSE PRACTITIONER

## 2020-09-29 NOTE — PROGRESS NOTES
Megan Marie received a viral test for COVID-19. They were educated on isolation and quarantine as appropriate. For any symptoms, they were directed to seek care from their PCP, given contact information to establish with a doctor, directed to an urgent care or the emergency room.

## 2020-09-29 NOTE — PATIENT INSTRUCTIONS

## 2020-10-01 LAB — SARS-COV-2, NAA: NOT DETECTED

## 2020-10-05 ENCOUNTER — HOSPITAL ENCOUNTER (OUTPATIENT)
Dept: CARDIAC CATH/INVASIVE PROCEDURES | Age: 75
Discharge: HOME | End: 2020-10-09
Attending: INTERNAL MEDICINE | Admitting: INTERNAL MEDICINE
Payer: MEDICARE

## 2020-10-05 ENCOUNTER — ANESTHESIA EVENT (OUTPATIENT)
Dept: CARDIAC CATH/INVASIVE PROCEDURES | Age: 75
End: 2020-10-05
Payer: MEDICARE

## 2020-10-05 ENCOUNTER — APPOINTMENT (OUTPATIENT)
Dept: GENERAL RADIOLOGY | Age: 75
End: 2020-10-05
Attending: INTERNAL MEDICINE
Payer: MEDICARE

## 2020-10-05 ENCOUNTER — ANESTHESIA (OUTPATIENT)
Dept: CARDIAC CATH/INVASIVE PROCEDURES | Age: 75
End: 2020-10-05
Payer: MEDICARE

## 2020-10-05 VITALS
OXYGEN SATURATION: 94 % | SYSTOLIC BLOOD PRESSURE: 127 MMHG | DIASTOLIC BLOOD PRESSURE: 60 MMHG | TEMPERATURE: 97.3 F | RESPIRATION RATE: 2 BRPM

## 2020-10-05 PROBLEM — I48.0 PAF (PAROXYSMAL ATRIAL FIBRILLATION) (HCC): Status: ACTIVE | Noted: 2020-10-05

## 2020-10-05 PROBLEM — Z95.818 STATUS POST PLACEMENT OF IMPLANTABLE LOOP RECORDER: Status: ACTIVE | Noted: 2020-10-05

## 2020-10-05 LAB
A/G RATIO: 1.6 (ref 1.1–2.2)
ABO/RH: NORMAL
ALBUMIN SERPL-MCNC: 4.6 G/DL (ref 3.4–5)
ALP BLD-CCNC: 70 U/L (ref 40–129)
ALT SERPL-CCNC: 55 U/L (ref 10–40)
ANION GAP SERPL CALCULATED.3IONS-SCNC: 11 MMOL/L (ref 3–16)
ANTIBODY SCREEN: NORMAL
AST SERPL-CCNC: 34 U/L (ref 15–37)
BILIRUB SERPL-MCNC: 0.6 MG/DL (ref 0–1)
BUN BLDV-MCNC: 27 MG/DL (ref 7–20)
CALCIUM SERPL-MCNC: 10.1 MG/DL (ref 8.3–10.6)
CHLORIDE BLD-SCNC: 104 MMOL/L (ref 99–110)
CHOLESTEROL, TOTAL: 156 MG/DL (ref 0–199)
CO2: 25 MMOL/L (ref 21–32)
CREAT SERPL-MCNC: 0.9 MG/DL (ref 0.8–1.3)
EKG ATRIAL RATE: 53 BPM
EKG DIAGNOSIS: NORMAL
EKG P AXIS: 62 DEGREES
EKG P-R INTERVAL: 162 MS
EKG Q-T INTERVAL: 444 MS
EKG QRS DURATION: 100 MS
EKG QTC CALCULATION (BAZETT): 416 MS
EKG R AXIS: 39 DEGREES
EKG T AXIS: 45 DEGREES
EKG VENTRICULAR RATE: 53 BPM
GFR AFRICAN AMERICAN: >60
GFR NON-AFRICAN AMERICAN: >60
GLOBULIN: 2.9 G/DL
GLUCOSE BLD-MCNC: 135 MG/DL (ref 70–99)
GLUCOSE BLD-MCNC: 143 MG/DL (ref 70–99)
GLUCOSE BLD-MCNC: 157 MG/DL (ref 70–99)
GLUCOSE BLD-MCNC: 162 MG/DL (ref 70–99)
HCT VFR BLD CALC: 39.9 % (ref 40.5–52.5)
HCT VFR BLD CALC: 43.4 % (ref 40.5–52.5)
HDLC SERPL-MCNC: 54 MG/DL (ref 40–60)
HEMOGLOBIN: 13.2 G/DL (ref 13.5–17.5)
HEMOGLOBIN: 14.2 G/DL (ref 13.5–17.5)
INR BLD: 2.76 (ref 0.86–1.14)
LDL CHOLESTEROL CALCULATED: 74 MG/DL
MAGNESIUM: 2.2 MG/DL (ref 1.8–2.4)
MCH RBC QN AUTO: 27.5 PG (ref 26–34)
MCHC RBC AUTO-ENTMCNC: 32.7 G/DL (ref 31–36)
MCV RBC AUTO: 84.4 FL (ref 80–100)
PDW BLD-RTO: 14.8 % (ref 12.4–15.4)
PERFORMED ON: ABNORMAL
PLATELET # BLD: 209 K/UL (ref 135–450)
PMV BLD AUTO: 8.6 FL (ref 5–10.5)
POC ACT LR: >400 SEC
POTASSIUM SERPL-SCNC: 4 MMOL/L (ref 3.5–5.1)
PROTHROMBIN TIME: 32.4 SEC (ref 10–13.2)
RBC # BLD: 5.14 M/UL (ref 4.2–5.9)
SODIUM BLD-SCNC: 140 MMOL/L (ref 136–145)
TOTAL PROTEIN: 7.5 G/DL (ref 6.4–8.2)
TRIGL SERPL-MCNC: 139 MG/DL (ref 0–150)
TSH REFLEX: 1.87 UIU/ML (ref 0.27–4.2)
VLDLC SERPL CALC-MCNC: 28 MG/DL
WBC # BLD: 6.8 K/UL (ref 4–11)

## 2020-10-05 PROCEDURE — C1769 GUIDE WIRE: HCPCS

## 2020-10-05 PROCEDURE — 6360000002 HC RX W HCPCS: Performed by: NURSE ANESTHETIST, CERTIFIED REGISTERED

## 2020-10-05 PROCEDURE — 93662 INTRACARDIAC ECG (ICE): CPT | Performed by: INTERNAL MEDICINE

## 2020-10-05 PROCEDURE — 93010 ELECTROCARDIOGRAM REPORT: CPT | Performed by: INTERNAL MEDICINE

## 2020-10-05 PROCEDURE — 93320 DOPPLER ECHO COMPLETE: CPT

## 2020-10-05 PROCEDURE — 85347 COAGULATION TIME ACTIVATED: CPT

## 2020-10-05 PROCEDURE — C1732 CATH, EP, DIAG/ABL, 3D/VECT: HCPCS

## 2020-10-05 PROCEDURE — 2580000003 HC RX 258: Performed by: NURSE ANESTHETIST, CERTIFIED REGISTERED

## 2020-10-05 PROCEDURE — 93312 ECHO TRANSESOPHAGEAL: CPT

## 2020-10-05 PROCEDURE — 2500000003 HC RX 250 WO HCPCS

## 2020-10-05 PROCEDURE — 86901 BLOOD TYPING SEROLOGIC RH(D): CPT

## 2020-10-05 PROCEDURE — 2580000003 HC RX 258: Performed by: INTERNAL MEDICINE

## 2020-10-05 PROCEDURE — 85027 COMPLETE CBC AUTOMATED: CPT

## 2020-10-05 PROCEDURE — 83735 ASSAY OF MAGNESIUM: CPT

## 2020-10-05 PROCEDURE — 2580000003 HC RX 258

## 2020-10-05 PROCEDURE — 71045 X-RAY EXAM CHEST 1 VIEW: CPT

## 2020-10-05 PROCEDURE — C1759 CATH, INTRA ECHOCARDIOGRAPHY: HCPCS

## 2020-10-05 PROCEDURE — 93005 ELECTROCARDIOGRAM TRACING: CPT | Performed by: INTERNAL MEDICINE

## 2020-10-05 PROCEDURE — 85014 HEMATOCRIT: CPT

## 2020-10-05 PROCEDURE — 93656 COMPRE EP EVAL ABLTJ ATR FIB: CPT | Performed by: INTERNAL MEDICINE

## 2020-10-05 PROCEDURE — 93656 COMPRE EP EVAL ABLTJ ATR FIB: CPT

## 2020-10-05 PROCEDURE — 80061 LIPID PANEL: CPT

## 2020-10-05 PROCEDURE — 2500000003 HC RX 250 WO HCPCS: Performed by: NURSE ANESTHETIST, CERTIFIED REGISTERED

## 2020-10-05 PROCEDURE — 93613 INTRACARDIAC EPHYS 3D MAPG: CPT | Performed by: INTERNAL MEDICINE

## 2020-10-05 PROCEDURE — 6360000002 HC RX W HCPCS

## 2020-10-05 PROCEDURE — 84443 ASSAY THYROID STIM HORMONE: CPT

## 2020-10-05 PROCEDURE — 93613 INTRACARDIAC EPHYS 3D MAPG: CPT

## 2020-10-05 PROCEDURE — C1894 INTRO/SHEATH, NON-LASER: HCPCS

## 2020-10-05 PROCEDURE — 80053 COMPREHEN METABOLIC PANEL: CPT

## 2020-10-05 PROCEDURE — 86900 BLOOD TYPING SEROLOGIC ABO: CPT

## 2020-10-05 PROCEDURE — 85018 HEMOGLOBIN: CPT

## 2020-10-05 PROCEDURE — 7100000000 HC PACU RECOVERY - FIRST 15 MIN

## 2020-10-05 PROCEDURE — 86850 RBC ANTIBODY SCREEN: CPT

## 2020-10-05 PROCEDURE — 93622 COMP EP EVAL L VENTR PAC&REC: CPT

## 2020-10-05 PROCEDURE — 2709999900 HC NON-CHARGEABLE SUPPLY

## 2020-10-05 PROCEDURE — 7100000001 HC PACU RECOVERY - ADDTL 15 MIN

## 2020-10-05 PROCEDURE — 93655 ICAR CATH ABLTJ DSCRT ARRHYT: CPT | Performed by: INTERNAL MEDICINE

## 2020-10-05 PROCEDURE — 6370000000 HC RX 637 (ALT 250 FOR IP): Performed by: INTERNAL MEDICINE

## 2020-10-05 PROCEDURE — 93662 INTRACARDIAC ECG (ICE): CPT

## 2020-10-05 PROCEDURE — 85610 PROTHROMBIN TIME: CPT

## 2020-10-05 PROCEDURE — 93623 PRGRMD STIMJ&PACG IV RX NFS: CPT

## 2020-10-05 PROCEDURE — 36415 COLL VENOUS BLD VENIPUNCTURE: CPT

## 2020-10-05 PROCEDURE — 93622 COMP EP EVAL L VENTR PAC&REC: CPT | Performed by: INTERNAL MEDICINE

## 2020-10-05 PROCEDURE — 2720000010 HC SURG SUPPLY STERILE

## 2020-10-05 PROCEDURE — 93655 ICAR CATH ABLTJ DSCRT ARRHYT: CPT

## 2020-10-05 PROCEDURE — 6370000000 HC RX 637 (ALT 250 FOR IP)

## 2020-10-05 PROCEDURE — 3700000000 HC ANESTHESIA ATTENDED CARE

## 2020-10-05 PROCEDURE — 93623 PRGRMD STIMJ&PACG IV RX NFS: CPT | Performed by: INTERNAL MEDICINE

## 2020-10-05 PROCEDURE — 93325 DOPPLER ECHO COLOR FLOW MAPG: CPT

## 2020-10-05 PROCEDURE — 3700000001 HC ADD 15 MINUTES (ANESTHESIA)

## 2020-10-05 PROCEDURE — C1730 CATH, EP, 19 OR FEW ELECT: HCPCS

## 2020-10-05 RX ORDER — TAMSULOSIN HYDROCHLORIDE 0.4 MG/1
0.4 CAPSULE ORAL NIGHTLY
Status: DISCONTINUED | OUTPATIENT
Start: 2020-10-05 | End: 2020-10-09 | Stop reason: HOSPADM

## 2020-10-05 RX ORDER — OXYCODONE HYDROCHLORIDE AND ACETAMINOPHEN 5; 325 MG/1; MG/1
1 TABLET ORAL
Status: DISCONTINUED | OUTPATIENT
Start: 2020-10-05 | End: 2020-10-05

## 2020-10-05 RX ORDER — IPRATROPIUM BROMIDE AND ALBUTEROL SULFATE 2.5; .5 MG/3ML; MG/3ML
SOLUTION RESPIRATORY (INHALATION)
Status: COMPLETED
Start: 2020-10-05 | End: 2020-10-05

## 2020-10-05 RX ORDER — FUROSEMIDE 10 MG/ML
INJECTION INTRAMUSCULAR; INTRAVENOUS PRN
Status: DISCONTINUED | OUTPATIENT
Start: 2020-10-05 | End: 2020-10-05 | Stop reason: SDUPTHER

## 2020-10-05 RX ORDER — ONDANSETRON 2 MG/ML
4 INJECTION INTRAMUSCULAR; INTRAVENOUS
Status: DISCONTINUED | OUTPATIENT
Start: 2020-10-05 | End: 2020-10-05

## 2020-10-05 RX ORDER — LIDOCAINE HYDROCHLORIDE 20 MG/ML
INJECTION, SOLUTION EPIDURAL; INFILTRATION; INTRACAUDAL; PERINEURAL PRN
Status: DISCONTINUED | OUTPATIENT
Start: 2020-10-05 | End: 2020-10-05 | Stop reason: SDUPTHER

## 2020-10-05 RX ORDER — GLYCOPYRROLATE 1 MG/5 ML
SYRINGE (ML) INTRAVENOUS PRN
Status: DISCONTINUED | OUTPATIENT
Start: 2020-10-05 | End: 2020-10-05 | Stop reason: SDUPTHER

## 2020-10-05 RX ORDER — HEPARIN SODIUM 1000 [USP'U]/ML
INJECTION, SOLUTION INTRAVENOUS; SUBCUTANEOUS PRN
Status: DISCONTINUED | OUTPATIENT
Start: 2020-10-05 | End: 2020-10-05 | Stop reason: SDUPTHER

## 2020-10-05 RX ORDER — FENTANYL CITRATE 50 UG/ML
INJECTION, SOLUTION INTRAMUSCULAR; INTRAVENOUS PRN
Status: DISCONTINUED | OUTPATIENT
Start: 2020-10-05 | End: 2020-10-05 | Stop reason: SDUPTHER

## 2020-10-05 RX ORDER — SUCCINYLCHOLINE/SOD CL,ISO/PF 200MG/10ML
SYRINGE (ML) INTRAVENOUS PRN
Status: DISCONTINUED | OUTPATIENT
Start: 2020-10-05 | End: 2020-10-05 | Stop reason: SDUPTHER

## 2020-10-05 RX ORDER — LABETALOL HYDROCHLORIDE 5 MG/ML
5 INJECTION, SOLUTION INTRAVENOUS EVERY 10 MIN PRN
Status: DISCONTINUED | OUTPATIENT
Start: 2020-10-05 | End: 2020-10-05

## 2020-10-05 RX ORDER — ATORVASTATIN CALCIUM 10 MG/1
10 TABLET, FILM COATED ORAL NIGHTLY
Status: DISCONTINUED | OUTPATIENT
Start: 2020-10-05 | End: 2020-10-09 | Stop reason: HOSPADM

## 2020-10-05 RX ORDER — METOPROLOL TARTRATE 50 MG/1
50 TABLET, FILM COATED ORAL 2 TIMES DAILY
Status: DISCONTINUED | OUTPATIENT
Start: 2020-10-05 | End: 2020-10-09 | Stop reason: HOSPADM

## 2020-10-05 RX ORDER — SODIUM CHLORIDE 0.9 % (FLUSH) 0.9 %
10 SYRINGE (ML) INJECTION PRN
Status: DISCONTINUED | OUTPATIENT
Start: 2020-10-05 | End: 2020-10-09 | Stop reason: HOSPADM

## 2020-10-05 RX ORDER — HYDRALAZINE HYDROCHLORIDE 20 MG/ML
5 INJECTION INTRAMUSCULAR; INTRAVENOUS EVERY 10 MIN PRN
Status: DISCONTINUED | OUTPATIENT
Start: 2020-10-05 | End: 2020-10-05

## 2020-10-05 RX ORDER — MEPERIDINE HYDROCHLORIDE 25 MG/ML
12.5 INJECTION INTRAMUSCULAR; INTRAVENOUS; SUBCUTANEOUS EVERY 5 MIN PRN
Status: DISCONTINUED | OUTPATIENT
Start: 2020-10-05 | End: 2020-10-05

## 2020-10-05 RX ORDER — ONDANSETRON 2 MG/ML
INJECTION INTRAMUSCULAR; INTRAVENOUS PRN
Status: DISCONTINUED | OUTPATIENT
Start: 2020-10-05 | End: 2020-10-05

## 2020-10-05 RX ORDER — IPRATROPIUM BROMIDE AND ALBUTEROL SULFATE 2.5; .5 MG/3ML; MG/3ML
1 SOLUTION RESPIRATORY (INHALATION) ONCE
Status: COMPLETED | OUTPATIENT
Start: 2020-10-05 | End: 2020-10-05

## 2020-10-05 RX ORDER — PROTAMINE SULFATE 10 MG/ML
INJECTION, SOLUTION INTRAVENOUS PRN
Status: DISCONTINUED | OUTPATIENT
Start: 2020-10-05 | End: 2020-10-05 | Stop reason: SDUPTHER

## 2020-10-05 RX ORDER — NEOSTIGMINE METHYLSULFATE 5 MG/5 ML
SYRINGE (ML) INTRAVENOUS PRN
Status: DISCONTINUED | OUTPATIENT
Start: 2020-10-05 | End: 2020-10-05 | Stop reason: SDUPTHER

## 2020-10-05 RX ORDER — ACETAMINOPHEN 325 MG/1
650 TABLET ORAL EVERY 4 HOURS PRN
Status: DISCONTINUED | OUTPATIENT
Start: 2020-10-05 | End: 2020-10-09 | Stop reason: HOSPADM

## 2020-10-05 RX ORDER — HYDROCHLOROTHIAZIDE 25 MG/1
25 TABLET ORAL DAILY
Status: DISCONTINUED | OUTPATIENT
Start: 2020-10-05 | End: 2020-10-09 | Stop reason: HOSPADM

## 2020-10-05 RX ORDER — DEXAMETHASONE SODIUM PHOSPHATE 4 MG/ML
INJECTION, SOLUTION INTRA-ARTICULAR; INTRALESIONAL; INTRAMUSCULAR; INTRAVENOUS; SOFT TISSUE PRN
Status: DISCONTINUED | OUTPATIENT
Start: 2020-10-05 | End: 2020-10-05 | Stop reason: SDUPTHER

## 2020-10-05 RX ORDER — AMLODIPINE BESYLATE 5 MG/1
10 TABLET ORAL DAILY
Status: DISCONTINUED | OUTPATIENT
Start: 2020-10-05 | End: 2020-10-08

## 2020-10-05 RX ORDER — FLUTICASONE PROPIONATE 50 MCG
1 SPRAY, SUSPENSION (ML) NASAL DAILY
Status: DISCONTINUED | OUTPATIENT
Start: 2020-10-05 | End: 2020-10-09 | Stop reason: HOSPADM

## 2020-10-05 RX ORDER — ALLOPURINOL 300 MG/1
300 TABLET ORAL DAILY
Status: DISCONTINUED | OUTPATIENT
Start: 2020-10-05 | End: 2020-10-09 | Stop reason: HOSPADM

## 2020-10-05 RX ORDER — EPHEDRINE SULFATE/0.9% NACL/PF 50 MG/5 ML
SYRINGE (ML) INTRAVENOUS PRN
Status: DISCONTINUED | OUTPATIENT
Start: 2020-10-05 | End: 2020-10-05 | Stop reason: SDUPTHER

## 2020-10-05 RX ORDER — MULTIVITAMIN WITH IRON
1 TABLET ORAL DAILY
Status: DISCONTINUED | OUTPATIENT
Start: 2020-10-05 | End: 2020-10-09 | Stop reason: HOSPADM

## 2020-10-05 RX ORDER — SODIUM CHLORIDE 9 MG/ML
INJECTION, SOLUTION INTRAVENOUS CONTINUOUS PRN
Status: DISCONTINUED | OUTPATIENT
Start: 2020-10-05 | End: 2020-10-05 | Stop reason: SDUPTHER

## 2020-10-05 RX ORDER — PANTOPRAZOLE SODIUM 40 MG/1
40 TABLET, DELAYED RELEASE ORAL
Status: DISCONTINUED | OUTPATIENT
Start: 2020-10-05 | End: 2020-10-09 | Stop reason: HOSPADM

## 2020-10-05 RX ORDER — LOSARTAN POTASSIUM 25 MG/1
50 TABLET ORAL DAILY
Status: DISCONTINUED | OUTPATIENT
Start: 2020-10-05 | End: 2020-10-09 | Stop reason: HOSPADM

## 2020-10-05 RX ORDER — PROPOFOL 10 MG/ML
INJECTION, EMULSION INTRAVENOUS PRN
Status: DISCONTINUED | OUTPATIENT
Start: 2020-10-05 | End: 2020-10-05 | Stop reason: SDUPTHER

## 2020-10-05 RX ORDER — ASPIRIN 81 MG/1
81 TABLET, CHEWABLE ORAL DAILY
Status: DISCONTINUED | OUTPATIENT
Start: 2020-10-05 | End: 2020-10-09 | Stop reason: HOSPADM

## 2020-10-05 RX ORDER — SODIUM CHLORIDE 9 MG/ML
INJECTION, SOLUTION INTRAVENOUS CONTINUOUS PRN
Status: DISCONTINUED | OUTPATIENT
Start: 2020-10-05 | End: 2020-10-05

## 2020-10-05 RX ORDER — ONDANSETRON 2 MG/ML
INJECTION INTRAMUSCULAR; INTRAVENOUS PRN
Status: DISCONTINUED | OUTPATIENT
Start: 2020-10-05 | End: 2020-10-05 | Stop reason: SDUPTHER

## 2020-10-05 RX ORDER — METFORMIN HYDROCHLORIDE 500 MG/1
500 TABLET, EXTENDED RELEASE ORAL DAILY
Status: DISCONTINUED | OUTPATIENT
Start: 2020-10-05 | End: 2020-10-09 | Stop reason: HOSPADM

## 2020-10-05 RX ORDER — NALOXONE HYDROCHLORIDE 0.4 MG/ML
INJECTION, SOLUTION INTRAMUSCULAR; INTRAVENOUS; SUBCUTANEOUS
Status: COMPLETED
Start: 2020-10-05 | End: 2020-10-05

## 2020-10-05 RX ORDER — SODIUM CHLORIDE 0.9 % (FLUSH) 0.9 %
10 SYRINGE (ML) INJECTION EVERY 12 HOURS SCHEDULED
Status: DISCONTINUED | OUTPATIENT
Start: 2020-10-05 | End: 2020-10-09 | Stop reason: HOSPADM

## 2020-10-05 RX ORDER — HYDROMORPHONE HCL 110MG/55ML
0.5 PATIENT CONTROLLED ANALGESIA SYRINGE INTRAVENOUS EVERY 5 MIN PRN
Status: DISCONTINUED | OUTPATIENT
Start: 2020-10-05 | End: 2020-10-05

## 2020-10-05 RX ORDER — ROCURONIUM BROMIDE 10 MG/ML
INJECTION, SOLUTION INTRAVENOUS PRN
Status: DISCONTINUED | OUTPATIENT
Start: 2020-10-05 | End: 2020-10-05 | Stop reason: SDUPTHER

## 2020-10-05 RX ADMIN — ISOPROTERENOL HYDROCHLORIDE 10 MCG/MIN: 0.2 INJECTION, SOLUTION INTRAMUSCULAR; INTRAVENOUS at 13:57

## 2020-10-05 RX ADMIN — WARFARIN SODIUM 6 MG: 1 TABLET ORAL at 20:31

## 2020-10-05 RX ADMIN — PROTAMINE SULFATE 40 MG: 10 INJECTION, SOLUTION INTRAVENOUS at 14:25

## 2020-10-05 RX ADMIN — Medication 10 MG: at 11:57

## 2020-10-05 RX ADMIN — PHENYLEPHRINE HYDROCHLORIDE 100 MCG: 10 INJECTION INTRAVENOUS at 15:03

## 2020-10-05 RX ADMIN — PHENYLEPHRINE HYDROCHLORIDE 40 MCG/MIN: 10 INJECTION INTRAVENOUS at 11:48

## 2020-10-05 RX ADMIN — Medication 160 MG: at 11:37

## 2020-10-05 RX ADMIN — ATORVASTATIN CALCIUM 10 MG: 10 TABLET, FILM COATED ORAL at 20:28

## 2020-10-05 RX ADMIN — PANTOPRAZOLE SODIUM 40 MG: 40 TABLET, DELAYED RELEASE ORAL at 18:38

## 2020-10-05 RX ADMIN — METOPROLOL TARTRATE 50 MG: 50 TABLET, FILM COATED ORAL at 20:28

## 2020-10-05 RX ADMIN — Medication 10 MG: at 12:34

## 2020-10-05 RX ADMIN — Medication 10 ML: at 20:31

## 2020-10-05 RX ADMIN — SUGAMMADEX 50 MG: 100 INJECTION, SOLUTION INTRAVENOUS at 15:07

## 2020-10-05 RX ADMIN — Medication 0.6 MG: at 14:28

## 2020-10-05 RX ADMIN — IPRATROPIUM BROMIDE AND ALBUTEROL SULFATE 1 AMPULE: .5; 3 SOLUTION RESPIRATORY (INHALATION) at 15:40

## 2020-10-05 RX ADMIN — SODIUM CHLORIDE: 9 INJECTION, SOLUTION INTRAVENOUS at 11:30

## 2020-10-05 RX ADMIN — DEXAMETHASONE SODIUM PHOSPHATE 4 MG: 4 INJECTION, SOLUTION INTRAMUSCULAR; INTRAVENOUS at 12:20

## 2020-10-05 RX ADMIN — PROPOFOL 140 MG: 10 INJECTION, EMULSION INTRAVENOUS at 11:37

## 2020-10-05 RX ADMIN — Medication 0.2 MG: at 11:49

## 2020-10-05 RX ADMIN — HEPARIN SODIUM 9000 UNITS: 1000 INJECTION INTRAVENOUS; SUBCUTANEOUS at 12:19

## 2020-10-05 RX ADMIN — Medication 3 MG: at 14:28

## 2020-10-05 RX ADMIN — LIDOCAINE HYDROCHLORIDE 100 MG: 20 INJECTION, SOLUTION EPIDURAL; INFILTRATION; INTRACAUDAL; PERINEURAL at 11:37

## 2020-10-05 RX ADMIN — IPRATROPIUM BROMIDE AND ALBUTEROL SULFATE 1 AMPULE: 2.5; .5 SOLUTION RESPIRATORY (INHALATION) at 15:40

## 2020-10-05 RX ADMIN — ONDANSETRON 4 MG: 2 INJECTION INTRAMUSCULAR; INTRAVENOUS at 14:25

## 2020-10-05 RX ADMIN — ROCURONIUM BROMIDE 5 MG: 10 INJECTION, SOLUTION INTRAVENOUS at 11:37

## 2020-10-05 RX ADMIN — SODIUM CHLORIDE: 9 INJECTION, SOLUTION INTRAVENOUS at 12:07

## 2020-10-05 RX ADMIN — Medication 10 MG: at 11:50

## 2020-10-05 RX ADMIN — PROPOFOL 40 MG: 10 INJECTION, EMULSION INTRAVENOUS at 12:41

## 2020-10-05 RX ADMIN — FENTANYL CITRATE 100 MCG: 50 INJECTION INTRAMUSCULAR; INTRAVENOUS at 11:37

## 2020-10-05 RX ADMIN — TAMSULOSIN HYDROCHLORIDE 0.4 MG: 0.4 CAPSULE ORAL at 20:28

## 2020-10-05 RX ADMIN — NALXONE HYDROCHLORIDE 0.4 MG: 0.4 INJECTION INTRAMUSCULAR; INTRAVENOUS; SUBCUTANEOUS at 15:29

## 2020-10-05 RX ADMIN — PHENYLEPHRINE HYDROCHLORIDE 100 MCG: 10 INJECTION INTRAVENOUS at 15:09

## 2020-10-05 RX ADMIN — ROCURONIUM BROMIDE 15 MG: 10 INJECTION, SOLUTION INTRAVENOUS at 11:45

## 2020-10-05 RX ADMIN — Medication 10 MG: at 12:13

## 2020-10-05 RX ADMIN — FUROSEMIDE 20 MG: 10 INJECTION, SOLUTION INTRAMUSCULAR; INTRAVENOUS at 14:24

## 2020-10-05 ASSESSMENT — PULMONARY FUNCTION TESTS
PIF_VALUE: 5
PIF_VALUE: 23
PIF_VALUE: 23
PIF_VALUE: 20
PIF_VALUE: 19
PIF_VALUE: 20
PIF_VALUE: 3
PIF_VALUE: 22
PIF_VALUE: 23
PIF_VALUE: 20
PIF_VALUE: 0
PIF_VALUE: 23
PIF_VALUE: 2
PIF_VALUE: 23
PIF_VALUE: 26
PIF_VALUE: 26
PIF_VALUE: 23
PIF_VALUE: 22
PIF_VALUE: 5
PIF_VALUE: 20
PIF_VALUE: 0
PIF_VALUE: 23
PIF_VALUE: 22
PIF_VALUE: 23
PIF_VALUE: 23
PIF_VALUE: 26
PIF_VALUE: 34
PIF_VALUE: 27
PIF_VALUE: 24
PIF_VALUE: 1
PIF_VALUE: 22
PIF_VALUE: 26
PIF_VALUE: 23
PIF_VALUE: 1
PIF_VALUE: 24
PIF_VALUE: 23
PIF_VALUE: 23
PIF_VALUE: 1
PIF_VALUE: 22
PIF_VALUE: 22
PIF_VALUE: 1
PIF_VALUE: 23
PIF_VALUE: 24
PIF_VALUE: 25
PIF_VALUE: 35
PIF_VALUE: 4
PIF_VALUE: 26
PIF_VALUE: 24
PIF_VALUE: 0
PIF_VALUE: 4
PIF_VALUE: 23
PIF_VALUE: 23
PIF_VALUE: 22
PIF_VALUE: 1
PIF_VALUE: 22
PIF_VALUE: 23
PIF_VALUE: 8
PIF_VALUE: 3
PIF_VALUE: 8
PIF_VALUE: 26
PIF_VALUE: 22
PIF_VALUE: 8
PIF_VALUE: 23
PIF_VALUE: 23
PIF_VALUE: 26
PIF_VALUE: 26
PIF_VALUE: 23
PIF_VALUE: 27
PIF_VALUE: 23
PIF_VALUE: 23
PIF_VALUE: 1
PIF_VALUE: 4
PIF_VALUE: 23
PIF_VALUE: 2
PIF_VALUE: 19
PIF_VALUE: 23
PIF_VALUE: 14
PIF_VALUE: 23
PIF_VALUE: 22
PIF_VALUE: 23
PIF_VALUE: 26
PIF_VALUE: 24
PIF_VALUE: 23
PIF_VALUE: 23
PIF_VALUE: 27
PIF_VALUE: 23
PIF_VALUE: 27
PIF_VALUE: 22
PIF_VALUE: 23
PIF_VALUE: 24
PIF_VALUE: 24
PIF_VALUE: 23
PIF_VALUE: 23
PIF_VALUE: 28
PIF_VALUE: 23
PIF_VALUE: 18
PIF_VALUE: 25
PIF_VALUE: 23
PIF_VALUE: 25
PIF_VALUE: 23
PIF_VALUE: 22
PIF_VALUE: 23
PIF_VALUE: 22
PIF_VALUE: 23
PIF_VALUE: 25
PIF_VALUE: 26
PIF_VALUE: 27
PIF_VALUE: 23
PIF_VALUE: 15
PIF_VALUE: 26
PIF_VALUE: 23
PIF_VALUE: 22
PIF_VALUE: 19
PIF_VALUE: 8
PIF_VALUE: 23
PIF_VALUE: 23
PIF_VALUE: 1
PIF_VALUE: 27
PIF_VALUE: 30
PIF_VALUE: 23
PIF_VALUE: 26
PIF_VALUE: 23
PIF_VALUE: 6
PIF_VALUE: 24
PIF_VALUE: 26
PIF_VALUE: 22
PIF_VALUE: 23
PIF_VALUE: 27
PIF_VALUE: 2
PIF_VALUE: 20
PIF_VALUE: 22
PIF_VALUE: 1
PIF_VALUE: 23
PIF_VALUE: 7
PIF_VALUE: 23
PIF_VALUE: 5
PIF_VALUE: 23
PIF_VALUE: 23
PIF_VALUE: 4
PIF_VALUE: 23
PIF_VALUE: 27
PIF_VALUE: 5
PIF_VALUE: 27
PIF_VALUE: 26
PIF_VALUE: 23
PIF_VALUE: 23
PIF_VALUE: 26
PIF_VALUE: 1
PIF_VALUE: 27
PIF_VALUE: 3
PIF_VALUE: 27
PIF_VALUE: 23
PIF_VALUE: 26
PIF_VALUE: 23
PIF_VALUE: 26
PIF_VALUE: 24
PIF_VALUE: 25
PIF_VALUE: 23
PIF_VALUE: 23
PIF_VALUE: 26
PIF_VALUE: 23
PIF_VALUE: 29
PIF_VALUE: 22
PIF_VALUE: 23
PIF_VALUE: 22
PIF_VALUE: 1
PIF_VALUE: 25
PIF_VALUE: 23
PIF_VALUE: 23
PIF_VALUE: 3
PIF_VALUE: 27
PIF_VALUE: 27
PIF_VALUE: 30
PIF_VALUE: 26
PIF_VALUE: 23
PIF_VALUE: 23
PIF_VALUE: 3
PIF_VALUE: 1
PIF_VALUE: 26

## 2020-10-05 ASSESSMENT — ENCOUNTER SYMPTOMS: SHORTNESS OF BREATH: 1

## 2020-10-05 NOTE — PROGRESS NOTES
4 Eyes Skin Assessment     NAME:  Nato Live  YOB: 1945  MEDICAL RECORD NUMBER:  [id number]    The patient is being assess for  Admission    I agree that 2 RN's have performed a thorough Head to Toe Skin Assessment on the patient. ALL assessment sites listed below have been assessed. Areas assessed by both nurses: Bonnie and Enedelia    Head, Face, Ears, Shoulders, Back, Chest, Arms, Elbows, Hands, Sacrum. Buttock, Coccyx, Ischium, Legs. Feet and Heels and Other right groin site-post ablation        Does the Patient have a Wound?  No noted wound(s)       Vito Prevention initiated:  Yes   Wound Care Orders initiated:  NA    Pressure Injury (Stage 3,4, Unstageable, DTI, NWPT, and Complex wounds) if present place consult order under [de-identified] NA    New and Established Ostomies if present place consult order under : NA      Nurse 1 eSignature: Electronically signed by Nancy Schuler RN on 10/5/20 at 6:18 PM EDT    **SHARE this note so that the co-signing nurse is able to place an eSignature**    Nurse 2 eSignature: Electronically signed by Nelida Dela Cruz RN on 10/5/20 at 6:19 PM EDT

## 2020-10-05 NOTE — PROCEDURES
[address]     Electrophysiology Procedure Note       Date of Procedure: 10/5/2020  Patient's Name: Bijan Mast  YOB: 1945   Medical Record Number: [id number]  Referring Physician: Aileen Avila MD  Procedure Performed by: Radha Zurita MD    Procedure performed:     · Electrophysiology study with left atrial recording and mapping   · 3-D electroanatomical mapping of the left atrium and  right atium. · Electrophysiological study(EPS) and induction after IV drug infusion  · Transseptal puncture through an intact septum . · Intracardiac echocardiography. · Radiofrequency ablation of atrial fibrillation and pulmonary veins isolation  ·    · ablation of complex atrial fractionated electrogram and focal source of Atrial fibrillation  as a separate mechanism  ·           Indications for procedure:   Symptomatic atrial fibrillation, failed antiarrhythmic therapy and cardioversion in the past   Bijan Mast is a 76 y.o. male   Due to failure of antiarrhythmic therapy in the past, patient has been scheduled to have ablation for symptomatic atrial fibrillation. Details of Procedure: The risks, benefits and alternatives of the ablation procedure were discussed with the patient. The risks including, but not limited to, the risks of bleeding, infection, radiation exposure, injury to vascular, cardiac and surrounding structures (including pneumothorax), stroke, cardiac perforation, tamponade, need for emergent open heart surgery, need for pacemaker implantation, esophageal injury and fistula, myocardial infarction and death were discussed in detail. The patient opted to proceed with the ablation. Written informed consent was signed and placed in the chart. Patient was brought to the EP lab in a fasting non-sedate state. Patient underwent general anesthesia by anesthesia team. We initially performed a transesophageal echo that showed no left atrial appendage clot/thrombus. Procedure was done without use of fluoroscopy except to move esophagus with Esosure    Both groins were prepped in a sterile fashion. We gained access in Right femoral vein. A 9-French sheath for ICE and 6F sheath for CS catheter and an Sr0 sheath for ablation and mapping catheters were  placed in the right femoral vein using modified seldinger technique. Ultrasound was used for femoral venous access. We gained access   modified Seldinger technique and ultrasound guidance. The femoral vein was identified with real time visualization of needle passage to the venous lumen. Using 3-D mapping system Carto the CS cathter was placed inside the coronary sinus  for recording and mapping of the left atrium. Using ICE we delineated the left pulmonary vein, left atrial appendage,  mitral valve, and right superior and right inferior pulmonary veins. Patient received a bolus of heparin prior transseptal puncture followed by continuous monitoring of the ACT and boluses of heparin during the procedure to keep the ACT more than 350. Also an esophageal temperature probe in addition to Esophastar catheter was advanced into the esophagus for mapping of the esophagus and careful monitoring of the esophageal temperature during ablation. A transseptal puncture through intact septum was done using ICE and  pressure monitoring . We placed a SR0 Sheaths inside the left atrium. Then a Circular catheter with deflectable curve and an irrigated ablation catheter was advanced into the left atrium sequentially and alternatively as needed. Using Penta ray athter and  Carto navigation system a three dimensional electro anatomical mapping of the left atrium, in addition to right and left sided pulmonary vein was created. Using Penta ray catheter voltage mapping of the left atrium was created.      Also an esophageal temperature probe in addition to Esophastar catheter was advanced into the esophagus for apex, showed VERP of 500/260 msec. After ablation we removed the catheters and sheaths from left atrium and performed EP study and programmed stimulation using our CS catheter and ablation cathter to assess for other arrhythmias. The deca polar/ablation catheter were moved from CS to right atrium, RV apex, and His bundle position. His bundle potentials was recorded and pacing was performed from right atrium, coronary sinus and RV apex with the following results:     AH interval was 109 msec  HV interval was 42 msec  Pacing from atrium, using CS catheter which was moved, showed 1:1 conduction over AV node with (AV wenckebach) was 350 msec  Pacing from atrium, AV clarence ERP was 600/<260 msec         atrial flutter  ms    At the end of the procedure, using ICE we confirmed the lack of any pericardial effusion. Figure of 8 suture was used and sheaths were removed using manual compression. Protamine 40 mg  The patient tolerated the procedure well and there were no complications. Patient was extubated and transferred to the floor in stable condition. Blood loss <67 cc  No complication  Conclusion:     - Pulmonary vein isolations using wide area circumferential radiofrequency ablation    - Additional ablation of  complex atrial fractionated electrogram and focal Atrial fibrillation     Plan:   The patient will be admitted overnight to CVU. He will receive usual post ablation care.      Gerard Dhillon MD,   [facility]   Office: [phone]

## 2020-10-05 NOTE — ANESTHESIA POSTPROCEDURE EVALUATION
Department of Anesthesiology  Postprocedure Note    Patient: Amarjit Arauz  MRN: [id number]  YOB: 1945  Date of evaluation: 10/5/2020  Time:  3:53 PM     Procedure Summary     Date:  10/05/20 Room / Location:  [facility] Cath Lab; [facility] Echocardiography    Anesthesia Start:  1132 Anesthesia Stop:  9181    Procedure:  ECHO LISSA IN CARDIAC CATH Diagnosis:       Paroxysmal atrial fibrillation      PAF (paroxysmal atrial fibrillation) ([facility] [address]    Scheduled Providers:   Responsible Provider:  Raul Aguirre MD    Anesthesia Type:  general ASA Status:  4          Anesthesia Type: general    Jacques Phase I: Jacques Score: 6    Jacques Phase II:      Last vitals: Reviewed and per EMR flowsheets.        Anesthesia Post Evaluation    Patient location during evaluation: PACU  Patient participation: complete - patient participated  Level of consciousness: awake  Airway patency: patent  Nausea & Vomiting: no nausea and no vomiting  Complications: no  Cardiovascular status: blood pressure returned to baseline  Respiratory status: acceptable  Hydration status: euvolemic

## 2020-10-05 NOTE — DISCHARGE SUMMARY
EP Discharge Summary  [facility]    Patient :Coretta Polanco  Room/Bed: CVU-2907/2907-01  Medical Record: [id number]  YOB: 1945    Admit date: 10/5/2020  Discharge date: 10/09/20     Admitting Physician: Jerald Sharma MD   Discharge NP: Maxime Marte CNP    Admission Diagnoses: Paroxysmal atrial fibrillation [I48.0]  PAF (paroxysmal atrial fibrillation) ([address] [I48.0]  Discharge Diagnoses: PAF    Discharged Condition: good    Hospital Course: The patient is a 76 y.o. male with a past medical history of atrial fibrillation, HTN, HLD, DM, DANO, and obesity. Hx of multiple DCCV    Interval HX: Patient presented for elective cardiac ablation. S/p RFCA with PVI on 10/5 with Dr. Shay Rose. Right groin ablation site soft, no hematoma/swelling noted. Up in chair, ambulating in room without issue. Pt noted to have possible PNA on CXR pre-ablation with elevated WBC count. Pulmonary was consulted and he was started on ABX. He has gradually improved and has been weaned off oxygen. He feels well this morning. Patient seen and examined. Notes, labs, and recent testing reviewed. Telemetry reviewed, pt in NSR  No new complaints today and no major events overnight. Denies having chest pain, palpitations, shortness of breath, edema or dizziness at the time of this visit. Consults: none    Objective:   /64   Pulse 72   Temp 98.1 °F (36.7 °C) (Temporal)   Resp 20   Ht 5' 6\" (1.676 m)   Wt 230 lb 2.6 oz (104.4 kg)   SpO2 96%   BMI 37.15 kg/m²      Intake/Output Summary (Last 24 hours) at 10/9/2020 0919  Last data filed at 10/9/2020 3962  Gross per 24 hour   Intake 240 ml   Output 2100 ml   Net -1860 ml       Physical Exam:  Telemetry: NSR  General: Alert & Oriented x4 in no distress  Skin: Warm and dry, no cyanosis or bruising  Neck: JVD <8, no bruit  Respiratory: Respirations symmetric and unlabored.  Lungs clear to auscultation bilaterally, no wheezing, rhonchi, or crackles  Cardiovascular: Regular rate and rhythm. S1/S2 present without murmurs, rubs, or gallops. Abdomen: Soft, nontender, +bowel sounds  Extremities: No edema. Right groin ablation site soft, no hematoma/swelling/oozing noted. Significant Diagnostic Studies:   LISSA: 10/5/20   Overall left ventricular function is normal.   Mitral valve is structurally normal.   Trivial mitral regurgitation is present. There is no evidence of mass or thrombus in the left atrium or appendage. Tricuspid aortic valve. No evidence of aortic valve regurgitation. CXR: 10/8/20  1. Stable low lung volumes with bibasilar atelectasis. 2. There is a right perihilar infiltrate which may be related to atelectasis    versus pneumonia, not appreciably changed.  Follow-up radiographs are    recommended to ensure resolution. Labs  CBC:   Lab Results   Component Value Date    WBC 9.6 10/08/2020    HGB 13.4 10/08/2020    HCT 40.3 10/08/2020    MCV 84.0 10/08/2020     10/08/2020     BMP:   Lab Results   Component Value Date     10/08/2020    K 4.3 10/08/2020    CL 99 10/08/2020    CO2 26 10/08/2020    BUN 28 10/08/2020    CREATININE 1.2 10/08/2020    GFRAA >60 10/08/2020    MG 2.20 10/05/2020      Estimated Creatinine Clearance: 61 mL/min (based on SCr of 1.2 mg/dL).      Thyroid: No results found for: TSH, S4HEEYX, M1PRCXZ, THYROIDAB  Lipids:  Lab Results   Component Value Date    CHOL 156 10/05/2020    HDL 54 10/05/2020    TRIG 139 10/05/2020     LFTS:   Lab Results   Component Value Date    ALT 55 10/05/2020    AST 34 10/05/2020    ALKPHOS 70 10/05/2020    PROT 7.5 10/05/2020    AGRATIO 1.6 10/05/2020    BILITOT 0.6 10/05/2020     Cardiac Enzymes: No results found for: CKTOTAL, CKMB, CKMBINDEX, TROPONINI  Coags:   Lab Results   Component Value Date    PROTIME 22.3 10/09/2020    INR 1.91 10/09/2020       Disposition: home    Home Medications:   Michelle Barlow, [address] [facility] Medication Instructions AUJ:[id number] Printed on:10/09/20 8700   Medication Information                      allopurinol (ZYLOPRIM) 300 MG tablet  Take 1 tablet by mouth daily             amLODIPine (NORVASC) 10 MG tablet  Take 10 mg by mouth daily             aspirin 81 MG chewable tablet  Take 1 mg by mouth daily             fluticasone (FLONASE) 50 MCG/ACT nasal spray  1 spray by Each Nostril route daily             hydrochlorothiazide (HYDRODIURIL) 25 MG tablet  Take 1 tablet by mouth daily             losartan (COZAAR) 50 MG tablet  Take 1 tablet by mouth daily             lovastatin (MEVACOR) 40 MG tablet  Take 40 mg by mouth daily             metFORMIN (GLUCOPHAGE-XR) 500 MG extended release tablet  Take 1 tablet by mouth daily             metoprolol tartrate (LOPRESSOR) 50 MG tablet  Take 50 mg by mouth 2 times daily             Multiple Vitamin (ONE-A-DAY MENS) TABS  Take 1 tablet by mouth daily             Multiple Vitamins-Minerals (ICAPS AREDS 2) CAPS  Take by mouth 2 daily             NONFORMULARY  Nuretin: takes 2 daily             Specialty Vitamins Products (PROSTATE) TABS  Take 2 daily             tamsulosin (FLOMAX) 0.4 MG capsule  Take 0.4 mg by mouth daily             warfarin (COUMADIN) 6 MG tablet  Take 6 mg by mouth Monitored by PCP                 Problem List:  Patient Active Problem List   Diagnosis    Paroxysmal atrial fibrillation (HCC)    Essential hypertension    Other hyperlipidemia    Type 2 diabetes mellitus without complication, without long-term current use of insulin (HCC)    Shortness of breath    Encounter for electronic analysis of reveal event recorder    Persistent atrial fibrillation (HCC)    Status post placement of implantable loop recorder    PAF (paroxysmal [facility] fibrillation) ([facility])        Assessment & Plan:    1.  Paroxysmal Atrial Fibrillation  - S/p RFCA with PVI (10/5/20, Dr. Irish Silva)  - Right groin stable, no hematoma/swelling/oozing  - Will start PPI daily x1 month s/p ablation  - Educated on post ablation discharge instructions/restrictions, pt VU    - Currently in NSR  - Continue metoprolol 50 mg BID  - [id number] score: 3 (Age, HTN, DM) ; [id number] Vasc score and anticoagulation discussed. High risk for stroke and thromboembolism. Anticoagulation is recommended. Risk of bleeding was discussed.  ~ On Coumadin; INR 1.91    - Afib risk factors including age, HTN, obesity, inactivity and DANO were discussed with patient. Risk factor modification recommended   ~ TSH 1.87 (10/20)      - Treatment options including cardioversion, rate control strategy, antiarrhythmics, anticoagulation and possible ablation were discussed with patient. Risks, benefits and alternative of each treatment options were explained. All questions answered    2. Implantable Loop Recorder  - S/p ILR insertion on   - Will use to monitor AF burden  - Follow up with device clinic as scheduled    3. HTN  - Controlled: Goal <130/80  - Continue current medications   ~ Stop amlodipine at discharge  - Encouraged to monitor and log BP readings at home, then bring log to next visit  - Discussed importance of low sodium diet, weight control and exercise    4. DANO  - Stable: Uses CPAP  - Encourage to use CPAP to prevent long term effects of untreated DANO    5. PNA, hypoxia   - Noted on CXR   - Improved, on room air   - ABX at d/c per pulmonary   - Will need follow up at d/c    6. Hyperlipidemia  - Controlled.  Goal: LDL <100   ~ LDL 74 (10/20)  - On lovastatin 40 mg QD  - Discussed diet, weight loss, and exercise needs  - Followed per PCP    Overall the patient is stable for discharge from a CV standpoint once cleared by pulmonology    Diet & Exercise:   The patient is counseled to follow a low salt diet to assure blood pressure remains controlled for cardiovascular risk factor modification   The patient is counseled to avoid excess caffeine, and energy drinks as this may exacerbated ectopy and arrhythmia   The patient is counseled to lose weight to control cardiovascular risk factors   Exercise program discussed: To improve overall cardiovascular health, the patient is instructed to increase cardiovascular related activities with a goal of 150 min/week of moderate level activity or 10,000 steps per day.  Encouraged to perform as much activity as tolerated    EF of 77-83%  ACEi for systolic HF: N/A  ASA and Statin for CAD: N/A  Anticoagulation for AF and heart failure: Yes  Tobacco use was discussed with the patient and education provided: N/A  Documentation sent to PCP: Note sent to PCP office    Activity: See discharge instructions  Diet: cardiac diet  Wound Care: See discharge instructions    F/U:   1. Follow-up with EP NP in 4-6 weeks with device check as scheduled  -[facility] at [phone] with any questions    Signed:  Zahraa Andrews CNP  10/9/2020  9:19 AM    Total time spent on day of discharge including face-to-face visit, examination, documentation, counseling, preparation of discharge plans and follow-up, and discharge medicine reconciliation and prescriptions is >31 minutes

## 2020-10-05 NOTE — ANESTHESIA PRE PROCEDURE
PREGSERUM, HCG, HCGQUANT     ABGs: No results found for: PHART, PO2ART, MHP0LYN, MXK4FGX, BEART, W6UBQVZU     Type & Screen (If Applicable):  No results found for: LABABO, LABRH    Drug/Infectious Status (If Applicable):  No results found for: HIV, HEPCAB    COVID-19 Screening (If Applicable):   Lab Results   Component Value Date    COVID19 NOT DETECTED 09/29/2020         Anesthesia Evaluation  Patient summary reviewed and Nursing notes reviewed  Airway: Mallampati: III        Dental:          Pulmonary:   (+) shortness of breath:                             Cardiovascular:  Exercise tolerance: poor (<4 METS),   (+) hypertension:, dysrhythmias: atrial fibrillation,                   Neuro/Psych:               GI/Hepatic/Renal:             Endo/Other:    (+) Diabetes, . Abdominal:           Vascular:                                        Anesthesia Plan      general     ASA 4       Induction: intravenous.                           Lucia Liang MD   10/5/2020

## 2020-10-05 NOTE — H&P
[facility]   Electrophysiology        Chief Complaint   Patient presents with    Follow-up     5 month    Atrial Fibrillation    Fatigue    Irregular Heart Beat        HPI: Bethany Velasco is a 76 y.o. seen today at the request of Dr Lucho Hinojosa. He states he was seen at [facility] ER 2/23/19 with heart racing and elevated blood pressure and shortness of breath . He was in atrial fibrillation and placed on Metoprolol and Xarelto. He then saw his pcp who changed Xarelto to Coumadin due to cost. Other history includes hypertension, hyperlipidemia, diabetes, and sleep apnea. He is newly treated for sleep apnea with C-pap. He does not smoke. He is physically active. He recently moved here from [place].     4/29/19:  ILR Implanted  5/31/19   DCCV    Interval history:     he has been in Atrial fibrillation for 2 months and not feeling well. Past Medical History:   Diagnosis Date    Clotting disorder ([facility]     Diabetes mellitus ([facility]     Hyperlipidemia     Hypertension         No past surgical history on file. Allergies: Allergies   Allergen Reactions    Amoxicillin Rash       Social History:   reports that he has never smoked. He has never used smokeless tobacco. He reports current alcohol use of about 3.0 standard drinks of alcohol per week. He reports that he does not use drugs. Family History:  family history includes Arrhythmia in his mother; Heart Attack in his maternal uncle; High Cholesterol in his father; Hypertension in his father. Reviewed. Denies family history of sudden cardiac death, arrhythmia, premature CAD    Review of System:  All other systems reviewed and are negative except for that noted above.  Pertinent negatives are:   General: negative for fever, chills   Ophthalmic ROS: negative for - eye pain or loss of vision  ENT ROS: negative for - headaches, sore throat   Respiratory: negative for - cough, sputum  Cardiovascular: Reviewed in HPI  Gastrointestinal: negative for - abdominal pain, diarrhea, N/V  Hematology: negative for - bleeding, blood clots, bruising or jaundice  Genito-Urinary:  negative for - Dysuria or incontinence  Musculoskeletal: negative for - Joint swelling, muscle pain  Neurological: negative for - confusion, dizziness, headaches   Psychiatric: No anxiety, no depression. Dermatological: negative for - rash    Physical Examination:  Vitals:    20 1349   BP: 132/76   Pulse: 103   Resp: 20      Wt Readings from Last 3 Encounters:   20 237 lb (107.5 kg)   20 232 lb (105.2 kg)   19 226 lb (102.5 kg)       · Constitutional: Oriented. No distress. obese  · Head: Normocephalic and atraumatic. · Mouth/Throat: Oropharynx is clear and moist.   · Eyes: Conjunctivae normal. EOM are normal.   · Neck: Neck supple. No rigidity. No JVD present. · Cardiovascular: Normal rate, irregular rhythm, S1&S2. · Pulmonary/Chest: Bilateral respiratory sounds. No wheezes, No rhonchi. · Abdominal: Soft. Bowel sounds present. No distension, No tenderness. · Musculoskeletal: No tenderness. No edema    · Lymphadenopathy: Has no cervical adenopathy. · Neurological: Alert and oriented. Cranial nerve appears intact, No Gross deficit   · Skin: Skin is warm and dry. No rash noted. · Psychiatric: Has a normal behavior       Labs, diagnostic and imaging results reviewed. Reviewed. Lab Results   Component Value Date    CREATININE 0.9 2019       EC20    Afib    Nuclear Stress 19  Summary    Normal LV size with hyperdynamic systolic function.    Left ventricular ejection fraction of 73 %.    There is normal isotope uptake at stress and rest.    There is no evidence of myocardial ischemia or scar. Echo: 3/1/19:   EF 50-55%. The left ventricular function is low normal.  There is mild global hypokinesis of the left ventricle. The diastolic function is impaired and classified as Grade 2  (psuedonormalization).   The left atrium is moderately dilated. There is mild mitral regurgitation. Right ventricular systolic pressure is indeterminate due to poorly  visualized tricuspid regurgitation      Medication:  Current Outpatient Medications   Medication Sig Dispense Refill    fluticasone (FLONASE) 50 MCG/ACT nasal spray 1 spray by Each Nostril route daily      warfarin (COUMADIN) 6 MG tablet Take 6 mg by mouth Monitored by PCP      metoprolol tartrate (LOPRESSOR) 50 MG tablet Take 50 mg by mouth 2 times daily      allopurinol (ZYLOPRIM) 300 MG tablet Take 1 tablet by mouth daily      metFORMIN (GLUCOPHAGE-XR) 500 MG extended release tablet Take 1 tablet by mouth daily      amLODIPine (NORVASC) 10 MG tablet Take 10 mg by mouth daily      losartan (COZAAR) 50 MG tablet Take 1 tablet by mouth daily      hydrochlorothiazide (HYDRODIURIL) 25 MG tablet Take 1 tablet by mouth daily      lovastatin (MEVACOR) 40 MG tablet Take 40 mg by mouth daily      tamsulosin (FLOMAX) 0.4 MG capsule Take 0.4 mg by mouth daily      aspirin 81 MG chewable tablet Take 1 mg by mouth daily      Specialty Vitamins Products (PROSTATE) TABS Take 2 daily      Multiple Vitamin (ONE-A-DAY MENS) TABS Take 1 tablet by mouth daily      NONFORMULARY Nuretin: takes 2 daily      Multiple Vitamins-Minerals (ICAPS AREDS 2) CAPS Take by mouth 2 daily       No current facility-administered medications for this visit. Assessment and plan:     Paroxysmal atrial fibrillation (HCC)        He is symptomatic with shortness of breath and palpitations    - Afib risk factors including age, HTN, obesity, inactivity and DANO were discussed with patient. Risk factor modification recommended. All questions were answered. - Again discussed treatment options including cardioversion, rate control strategy, anticoagulation were discussed with patient. Risks, benefits and alternative of each treatment options were explained. All questions answered.       [id number] Vasc score and anticoagulation discussed. High risk for stroke and thromboembolism. Anticoagulation is recommended. I discussed anticoagulation to decrease the risk of thromboembolic events including stroke. Benefits and alternatives were discussed with patient. Risk of bleeding was discussed. Patient verbalized understanding. On Coumadin d/t cost     The risks, benefits and alternatives of the ablation procedure were discussed with the patient. The risks including, but not limited to, the risks of bleeding, infection, radiation exposure, injury to vascular, cardiac and surrounding structures (including pneumothorax), stroke, cardiac perforation, tamponade, need for emergent open heart surgery, need for pacemaker implantation, Injury to the phrenic nerve, injury to the esophagus, myocardial infarction and death were discussed in detail. The patient opted to proceed with the ablation. S/p ILR  The CIED was interrogated and programmed and I supervised and reviewed all the data. All findings and changes are in device interrogation sheat and reflect my personal interpretation and changes and is scanned to Epic. Atrial fibrillation since May 2020, 42.5% overall  HTN  Vitals:    07/08/20 1349   BP: 132/76   Pulse: 103   Resp: 20     -Home BP monitoring encourage with a BP goal <130/80      Other hyperlipidemia             - follows with pcp. On statin   On statin    Sleep apnea                using cpap nightly      Obesity  Body mass index is 37.68 kg/m². - Excessive weight is complicating assessment and treatment. It is placing patient at risk for multiple co-morbidities as well as early death and contributing to the patient's presentation.   - discussed weight management with diet and exercise          Plan:    Ablation of Atrial fibrillation

## 2020-10-05 NOTE — PROGRESS NOTES
Per patient his breathing improving, he is requiring less O2. Post op cxr and H&H reviewed with Dr. Sonia Lee, no new orders ok to transfer to CVU. Right groin remains intact. NSR on tele. Will transfer.

## 2020-10-05 NOTE — PROGRESS NOTES
Patient to PACU from cath lab. Very drowsy, oral airway still in place. O2 sat 88% on simple mask, switched to non rebreather. BP low - medicated per CRNA at bedside. Dr. Dutton Picking to bedside. Right groin site c/d/i, soft, no bleeding, hematoma noted. +2 right pedal pulse.

## 2020-10-06 ENCOUNTER — APPOINTMENT (OUTPATIENT)
Dept: GENERAL RADIOLOGY | Age: 75
End: 2020-10-06
Attending: INTERNAL MEDICINE
Payer: MEDICARE

## 2020-10-06 LAB
ANION GAP SERPL CALCULATED.3IONS-SCNC: 12 MMOL/L (ref 3–16)
BUN BLDV-MCNC: 30 MG/DL (ref 7–20)
CALCIUM SERPL-MCNC: 8.1 MG/DL (ref 8.3–10.6)
CHLORIDE BLD-SCNC: 101 MMOL/L (ref 99–110)
CO2: 22 MMOL/L (ref 21–32)
CREAT SERPL-MCNC: 1 MG/DL (ref 0.8–1.3)
GFR AFRICAN AMERICAN: >60
GFR NON-AFRICAN AMERICAN: >60
GLUCOSE BLD-MCNC: 218 MG/DL (ref 70–99)
HCT VFR BLD CALC: 38.1 % (ref 40.5–52.5)
HEMOGLOBIN: 12.5 G/DL (ref 13.5–17.5)
INR BLD: 2.6 (ref 0.86–1.14)
MCH RBC QN AUTO: 27.8 PG (ref 26–34)
MCHC RBC AUTO-ENTMCNC: 32.9 G/DL (ref 31–36)
MCV RBC AUTO: 84.5 FL (ref 80–100)
PDW BLD-RTO: 15 % (ref 12.4–15.4)
PLATELET # BLD: 202 K/UL (ref 135–450)
PMV BLD AUTO: 8.7 FL (ref 5–10.5)
POTASSIUM SERPL-SCNC: 4.8 MMOL/L (ref 3.5–5.1)
PROTHROMBIN TIME: 30.5 SEC (ref 10–13.2)
RBC # BLD: 4.5 M/UL (ref 4.2–5.9)
SODIUM BLD-SCNC: 135 MMOL/L (ref 136–145)
WBC # BLD: 12.9 K/UL (ref 4–11)

## 2020-10-06 PROCEDURE — 94761 N-INVAS EAR/PLS OXIMETRY MLT: CPT

## 2020-10-06 PROCEDURE — 80048 BASIC METABOLIC PNL TOTAL CA: CPT

## 2020-10-06 PROCEDURE — 2580000003 HC RX 258: Performed by: INTERNAL MEDICINE

## 2020-10-06 PROCEDURE — 2700000000 HC OXYGEN THERAPY PER DAY

## 2020-10-06 PROCEDURE — 85027 COMPLETE CBC AUTOMATED: CPT

## 2020-10-06 PROCEDURE — 6370000000 HC RX 637 (ALT 250 FOR IP): Performed by: NURSE PRACTITIONER

## 2020-10-06 PROCEDURE — 6370000000 HC RX 637 (ALT 250 FOR IP): Performed by: INTERNAL MEDICINE

## 2020-10-06 PROCEDURE — 99223 1ST HOSP IP/OBS HIGH 75: CPT | Performed by: INTERNAL MEDICINE

## 2020-10-06 PROCEDURE — 87449 NOS EACH ORGANISM AG IA: CPT

## 2020-10-06 PROCEDURE — 71046 X-RAY EXAM CHEST 2 VIEWS: CPT

## 2020-10-06 PROCEDURE — 85610 PROTHROMBIN TIME: CPT

## 2020-10-06 PROCEDURE — 93005 ELECTROCARDIOGRAM TRACING: CPT | Performed by: INTERNAL MEDICINE

## 2020-10-06 PROCEDURE — 83036 HEMOGLOBIN GLYCOSYLATED A1C: CPT

## 2020-10-06 PROCEDURE — 99215 OFFICE O/P EST HI 40 MIN: CPT | Performed by: NURSE PRACTITIONER

## 2020-10-06 RX ORDER — DEXTROSE MONOHYDRATE 25 G/50ML
12.5 INJECTION, SOLUTION INTRAVENOUS PRN
Status: DISCONTINUED | OUTPATIENT
Start: 2020-10-06 | End: 2020-10-09 | Stop reason: HOSPADM

## 2020-10-06 RX ORDER — LEVOFLOXACIN 750 MG/1
750 TABLET ORAL DAILY
Status: DISCONTINUED | OUTPATIENT
Start: 2020-10-06 | End: 2020-10-09 | Stop reason: HOSPADM

## 2020-10-06 RX ORDER — NICOTINE POLACRILEX 4 MG
15 LOZENGE BUCCAL PRN
Status: DISCONTINUED | OUTPATIENT
Start: 2020-10-06 | End: 2020-10-09 | Stop reason: HOSPADM

## 2020-10-06 RX ORDER — MAGNESIUM HYDROXIDE/ALUMINUM HYDROXICE/SIMETHICONE 120; 1200; 1200 MG/30ML; MG/30ML; MG/30ML
30 SUSPENSION ORAL EVERY 6 HOURS PRN
Status: DISCONTINUED | OUTPATIENT
Start: 2020-10-06 | End: 2020-10-09 | Stop reason: HOSPADM

## 2020-10-06 RX ORDER — DEXTROSE MONOHYDRATE 50 MG/ML
100 INJECTION, SOLUTION INTRAVENOUS PRN
Status: DISCONTINUED | OUTPATIENT
Start: 2020-10-06 | End: 2020-10-09 | Stop reason: HOSPADM

## 2020-10-06 RX ADMIN — AMLODIPINE BESYLATE 10 MG: 5 TABLET ORAL at 08:14

## 2020-10-06 RX ADMIN — LOSARTAN POTASSIUM 50 MG: 25 TABLET, FILM COATED ORAL at 08:14

## 2020-10-06 RX ADMIN — TAMSULOSIN HYDROCHLORIDE 0.4 MG: 0.4 CAPSULE ORAL at 20:03

## 2020-10-06 RX ADMIN — ASPIRIN 81 MG: 81 TABLET, CHEWABLE ORAL at 08:14

## 2020-10-06 RX ADMIN — ATORVASTATIN CALCIUM 10 MG: 10 TABLET, FILM COATED ORAL at 20:03

## 2020-10-06 RX ADMIN — METOPROLOL TARTRATE 50 MG: 50 TABLET, FILM COATED ORAL at 08:14

## 2020-10-06 RX ADMIN — LEVOFLOXACIN 750 MG: 750 TABLET, FILM COATED ORAL at 14:31

## 2020-10-06 RX ADMIN — PANTOPRAZOLE SODIUM 40 MG: 40 TABLET, DELAYED RELEASE ORAL at 04:09

## 2020-10-06 RX ADMIN — THERA TABS 1 TABLET: TAB at 08:14

## 2020-10-06 RX ADMIN — ACETAMINOPHEN 650 MG: 325 TABLET ORAL at 22:25

## 2020-10-06 RX ADMIN — HYDROCHLOROTHIAZIDE 25 MG: 25 TABLET ORAL at 08:14

## 2020-10-06 RX ADMIN — Medication 10 ML: at 20:03

## 2020-10-06 RX ADMIN — ALLOPURINOL 300 MG: 300 TABLET ORAL at 08:14

## 2020-10-06 RX ADMIN — METOPROLOL TARTRATE 50 MG: 50 TABLET, FILM COATED ORAL at 20:03

## 2020-10-06 RX ADMIN — ALUMINUM HYDROXIDE, MAGNESIUM HYDROXIDE, AND SIMETHICONE 30 ML: 200; 200; 20 SUSPENSION ORAL at 14:31

## 2020-10-06 RX ADMIN — METFORMIN HYDROCHLORIDE 500 MG: 500 TABLET, EXTENDED RELEASE ORAL at 12:00

## 2020-10-06 ASSESSMENT — PAIN SCALES - GENERAL
PAINLEVEL_OUTOF10: 0
PAINLEVEL_OUTOF10: 3
PAINLEVEL_OUTOF10: 0
PAINLEVEL_OUTOF10: 0
PAINLEVEL_OUTOF10: 3

## 2020-10-06 NOTE — CARE COORDINATION
Patient admitted as Observation/OPIB with an anticipated short hospitalization length of stay. Chart reviewed and it appears that patient has minimal needs for discharge at this time. Discussed with patients nurse and requested that case management be notified if discharge needs are identified. Case management will continue to follow progress and update discharge plan as needed.     Murtaza Hugo, BSN, RN  [phone]

## 2020-10-06 NOTE — PROGRESS NOTES
Pharmacy to Dose Warfarin    Pharmacy consulted to dose warfarin for Afib. INR Goal: 2-3    INR today: 2.6    Assessment/Plan:  - INR therapeutic today  - Will continue home dose of 6 mg daily    Pharmacy will continue to follow.     Mario Sellers, PharmD, [place]  Clinical Pharmacist  [phone]

## 2020-10-06 NOTE — PROGRESS NOTES
Figure 8 stitch removed. Manual pressure held for 20 min. Site cleansed with chloraprep. Slight oozing noted from suture puncture sites on leg. Area slightly reddened without ecchymosis, hematoma, or oozing. Dry sterile folded 4 x 4 and tegaderm applied.

## 2020-10-06 NOTE — CONSULTS
PULMONARY AND CRITICAL CARE MEDICINE CONSULTATION NOTE    CONSULTING PHYSICIAN:  DANNIE Jones    ADMISSION DATE: 10/5/2020  ADMISSION DIAGNOSIS: Paroxysmal atrial fibrillation [I48.0]  [facility] [I48.0]    REASON FOR CONSULT: Pneumonia    DATE OF CONSULT: 10/5/2020    HISTORY OF PRESENT ILLNESS: 76y.o. year old male with a past medical history significant for diabetes, hypertension, DANO on CPAP, hyperlipidemia, atrial fibrillation who presented to the hospital with shortness of breath and palpitations. Patient has a history of multiple DC cardioversions in the past.  This time underwent an ablation procedure. Patient has been slightly hypoxic since the ablation procedure. Reports mild shortness of breath with exertion. Denies any cough, discolored expectoration, chest pain, chest tightness, fevers, orthopnea. Does use CPAP of 7 cm H2O at home. Does not use oxygen at home. Currently at 2 L/min and saturating 93% on the monitor. REVIEW OF SYSTEMS:     CONSTITUTIONAL SYMPTOMS: The patient denies fever, fatigue, night sweats, weight loss or weight gain. HEENT: No vision changes. No tinnitus, Denies sinus pain. No hoarseness, or dysphagia. NECK: Patient denies swelling in the neck. CARDIOVASCULAR: Denies chest pain, palpitation, syncope. RESPIRATORY: As per HPI. GASTROINTESTINAL: Denies nausea, abdominal pain or change in bowel function. GENITOURINARY: Denies obstructive symptoms. No history of incontinence. BREASTS: No masses or lumps in the breasts. SKIN: No rashes or itching. MUSCULOSKELETAL: Denies weakness or bone pain. NEUROLOGICAL: No headaches or seizures. PSYCHIATRIC: Denies mood swings or depression. ENDOCRINE: Denies heat or cold intolerance or excessive thirst.  HEMATOLOGIC/LYMPHATIC: Denies easy bruising or lymph node swelling. ALLERGIC/IMMUNOLOGIC: No environmental allergies.     PAST MEDICAL HISTORY:   Past Medical History: Diagnosis Date    Cl[facility] disorder [facility]     Diabetes mellitus ([facility]     Hyperlipidemia     Hypertension        PAST SURGICAL HISTORY: No past surgical history on file. SOCIAL HISTORY:   Social History     Tobacco Use    Smoking status: Never Smoker    Smokeless tobacco: Never Used   Substance Use Topics    Alcohol use: Yes     Alcohol/week: 3.0 standard drinks     Types: 3 Cans of beer per week    Drug use: Never       FAMILY HISTORY:   Family History   Problem Relation Age of Onset    Arrhythmia Mother     High Cholesterol Father     Hypertension Father     Heart Attack Maternal Uncle        MEDICATIONS:     No current facility-administered medications on file prior to encounter.       Current Outpatient Medications on File Prior to Encounter   Medication Sig Dispense Refill    fluticasone (FLONASE) 50 MCG/ACT nasal spray 1 spray by Each Nostril route daily      warfarin (COUMADIN) 6 MG tablet Take 6 mg by mouth Monitored by PCP      metoprolol tartrate (LOPRESSOR) 50 MG tablet Take 50 mg by mouth 2 times daily      allopurinol (ZYLOPRIM) 300 MG tablet Take 1 tablet by mouth daily      metFORMIN (GLUCOPHAGE-XR) 500 MG extended release tablet Take 1 tablet by mouth daily      amLODIPine (NORVASC) 10 MG tablet Take 10 mg by mouth daily      losartan (COZAAR) 50 MG tablet Take 1 tablet by mouth daily      hydrochlorothiazide (HYDRODIURIL) 25 MG tablet Take 1 tablet by mouth daily      lovastatin (MEVACOR) 40 MG tablet Take 40 mg by mouth daily      tamsulosin (FLOMAX) 0.4 MG capsule Take 0.4 mg by mouth daily      aspirin 81 MG chewable tablet Take 1 mg by mouth daily      Specialty Vitamins Products (PROSTATE) TABS Take 2 daily      Multiple Vitamin (ONE-A-DAY MENS) TABS Take 1 tablet by mouth daily      NONFORMULARY Nuretin: takes 2 daily      Multiple Vitamins-Minerals (ICAPS AREDS 2) CAPS Take by mouth 2 daily          allopurinol  300 mg Oral Daily    metFORMIN  500 mg Oral Daily    amLODIPine  10 mg Oral Daily    losartan  50 mg Oral Daily    hydroCHLOROthiazide  25 mg Oral Daily    atorvastatin  10 mg Oral Nightly    tamsulosin  0.4 mg Oral Nightly    aspirin  81 mg Oral Daily    multivitamin  1 tablet Oral Daily    warfarin  6 mg Oral Daily    metoprolol tartrate  50 mg Oral BID    fluticasone  1 spray Each Nostril Daily    sodium chloride flush  10 mL Intravenous 2 times per day    pantoprazole  40 mg Oral QAM AC      dextrose       glucose, dextrose, glucagon (rDNA), dextrose, sodium chloride flush, acetaminophen      ALLERGIES:   Allergies as of 10/05/2020 - Review Complete 10/05/2020   Allergen Reaction Noted    Amoxicillin Rash 04/17/2019      OBJECTIVE:   height is 5' 6\" (1.676 m) and weight is 235 lb 7.2 oz (106.8 kg). His temporal temperature is 98 °F (36.7 °C). His blood pressure is 132/54 (abnormal) and his pulse is 73. His respiration is 20 and oxygen saturation is 96%. No intake/output data recorded. PHYSICAL EXAM:    CONSTITUTIONAL: He is a 76y.o.-year-old who appears his stated age. He is alert and oriented x 3 and in no acute distress. HEENT: PERRLA, EOMI. No scleral icterus. No thrush, atraumatic, normocephalic. NECK: Supple, without cervical or supraclavicular lymphadenopathy:  CARDIOVASCULAR: S1 S2 RRR. Without murmer  RESPIRATORY & CHEST: Coarse crackles heard on the right side of the chest.  This clear with coughing. No wheezing heard. GASTROINTESTINAL & ABDOMEN: Soft, nontender, positive bowel sounds in all quadrants, non-distended, without hepatosplenomegaly. GENITOURINARY: Deferred. MUSCULOSKELETAL: No tenderness to palpation of the axial skeleton. There is no clubbing. No cyanosis. No edema of the lower extremities. Right groin dressing is present. SKIN OF BODY: No rash or jaundice. PSYCHIATRIC EVALUATION: Normal affect. Patient answers questions appropriately.    HEMATOLOGIC/LYMPHATIC/ IMMUNOLOGIC: No palpable lymphadenopathy. NEUROLOGIC: Alert and oriented x 3. Groslly non-focal. Motor strength is 5+/5 in all muscle groups. The patient has a normal sensorium globally. LABS:  Recent Labs     10/05/20  1109 10/05/20  1505 10/06/20  0405 10/06/20  0900   WBC 6.8  --   --  12.9*   HGB 14.2 13.2*  --  12.5*   HCT 43.4 39.9*  --  38.1*     --   --  202   CHOL 156  --   --   --    TRIG 139  --   --   --    HDL 54  --   --   --    ALT 55*  --   --   --    AST 34  --   --   --      --   --  135*   K 4.0  --   --  4.8     --   --  101   CREATININE 0.9  --   --  1.0   BUN 27*  --   --  30*   CO2 25  --   --  22   INR 2.76*  --  2.60*  --        Recent Labs     10/05/20  1109 10/06/20  0900   GLUCOSE 135* 218*   CALCIUM 10.1 8.1*    135*   K 4.0 4.8   CO2 25 22    101   BUN 27* 30*   CREATININE 0.9 1.0       No results for input(s): PHART, XLS2IDD, PO2ART, DYP5FYI, A0FSHHAL, BEART, V8VNOUMO in the last 72 hours. Lab Results   Component Value Date    INR 2.60 (H) 10/06/2020    INR 2.76 (H) 10/05/2020    INR 2.80 (H) 07/15/2020    PROTIME 30.5 (H) 10/06/2020    PROTIME 32.4 (H) 10/05/2020     No results found for: AMYLASE   No results found for: LABA1C  No results found for: EAG  No results found for: TSH, V2HIIBT, Z5IDENQ, THYROIDAB, FT3, T4FREE  No results found for: CKTOTAL, CKMB, CKMBINDEX, TROPONINI   No results found for: CRP   No results found for: BNP   No results found for: DDIMER   No results found for: FERRITIN   No results found for: LACTA        IMAGING:    Chest x-ray portable 1 view done on 10/6/2020 was personally reviewed by me and my interpretation is: Hazy infiltrates present in the right upper lobe. No pleural effusions, pneumothorax present. Cardiac silhouette is within normal limits. IMPRESSION:     1. Atrial fibrillation status post ablation. 2. Community-acquired pneumonia  3. Hypoxic respiratory failure  4.  Obstructive sleep apnea on CPAP therapy  5. Obesity    RECOMMENDATION:     1. Patient has shortness of breath and hypoxia. Chest imaging is showing right upper lobe haziness. Also developing leukocytosis without any fevers. Suspect community-acquired pneumonia. 2. We will check for urine strep and Legionella antigens. Procalcitonin levels. 3. Send sputum for Gram stain and culture if patient expectorates. 4. We will start him on p.o. Levaquin 750 mg daily for 7 days. 5. Advised patient to get his CPAP machine from home and use it at nighttime. This should improve his hypoxia. Meanwhile titrate oxygen flow to maintain SPO2 above 90%. Thank you for your consultation. We will continue to follow the patient. Anayeli Yi MD  Pulmonary Critical Care and Sleep Medicine  10/5/2020, 1:53 PM    This note was completed using dragon medical speech recognition software. Grammatical errors, random word insertions, pronoun errors and incomplete sentences are occasional consequences of this technology due to software limitations. If there are questions or concerns about the content of this note of information contained within the body of this dictation they should be addressed with the provider for clarification.

## 2020-10-06 NOTE — PROGRESS NOTES
[address]   Electrophysiology Progress Note     Date: 10/6/2020  Admit Date: 10/5/2020       Chief Complaint: SOB    History of Present Illness: History obtained from patient and medical record. Myla Brooke is a 76 y.o. male with a past medical history of atrial fibrillation, HTN, HLD, DM, DANO, and obesity. Hx of multiple DCCV    Interval Hx: Today, he is being seen for AF ablation. He feels pretty good. Up in chair, ambulating in room/hallway, but he is requiring 2L of oxygen. S/p RFCA with PVI. Mild oozing at groin site overnight and this AM. Right groin ablation site soft, no hematoma/ooozing/swelling noted. He has been hypoxic since the ablation and noted to have possible PNA on CXR. No SOB, orthopnea, fevers, or chills. He does admit to having a dry cough. Patient seen and examined. Clinical notes reviewed. Telemetry reviewed. No new complaints today. No major events overnight. Denies having chest pain, palpitations, shortness of breath, orthopnea/PND, cough, or dizziness at the time of this visit. Allergies: Allergies   Allergen Reactions    Amoxicillin Rash       Home Meds:  Prior to Visit Medications    Medication Sig Taking?  Authorizing Provider   fluticasone (FLONASE) 50 MCG/ACT nasal spray 1 spray by Each Nostril route daily Yes Historical Provider, MD   warfarin (COUMADIN) 6 MG tablet Take 6 mg by mouth Monitored by PCP Yes Historical Provider, MD   metoprolol tartrate (LOPRESSOR) 50 MG tablet Take 50 mg by mouth 2 times daily Yes Historical Provider, MD   allopurinol (ZYLOPRIM) 300 MG tablet Take 1 tablet by mouth daily Yes Historical Provider, MD   metFORMIN (GLUCOPHAGE-XR) 500 MG extended release tablet Take 1 tablet by mouth daily Yes Historical Provider, MD   amLODIPine (NORVASC) 10 MG tablet Take 10 mg by mouth daily Yes Historical Provider, MD   losartan (COZAAR) 50 MG tablet Take 1 tablet by mouth daily Yes Historical Provider, MD   hydrochlorothiazide (HYDRODIURIL) 25 MG tablet Take 1 tablet by mouth daily Yes Historical Provider, MD   lovastatin (MEVACOR) 40 MG tablet Take 40 mg by mouth daily Yes Historical Provider, MD   tamsulosin (FLOMAX) 0.4 MG capsule Take 0.4 mg by mouth daily Yes Historical Provider, MD   aspirin 81 MG chewable tablet Take 1 mg by mouth daily Yes Historical Provider, MD   Specialty Vitamins Products (PROSTATE) TABS Take 2 daily Yes Historical Provider, MD   Multiple Vitamin (ONE-A-DAY MENS) TABS Take 1 tablet by mouth daily Yes Historical Provider, MD   NONFORMULARY Nuretin: takes 2 daily Yes Historical Provider, MD   Multiple Vitamins-Minerals (ICAPS AREDS 2) CAPS Take by mouth 2 daily Yes Historical Provider, MD      Scheduled Meds:   allopurinol  300 mg Oral Daily    metFORMIN  500 mg Oral Daily    amLODIPine  10 mg Oral Daily    losartan  50 mg Oral Daily    hydroCHLOROthiazide  25 mg Oral Daily    atorvastatin  10 mg Oral Nightly    tamsulosin  0.4 mg Oral Nightly    aspirin  81 mg Oral Daily    multivitamin  1 tablet Oral Daily    warfarin  6 mg Oral Daily    metoprolol tartrate  50 mg Oral BID    fluticasone  1 spray Each Nostril Daily    sodium chloride flush  10 mL Intravenous 2 times per day    pantoprazole  40 mg Oral QAM AC     Continuous Infusions:   dextrose       PRN Meds:glucose, dextrose, glucagon (rDNA), dextrose, sodium chloride flush, acetaminophen     Past Medical History:  Past Medical History:   Diagnosis Date    [facility] disorder ([facility] [address]     Diabetes mellitus ([facility] [address]     Hyperlipidemia     Hypertension         Past Surgical History:    has no past surgical history on file. Social History:  Reviewed. reports that he has never smoked. He has never used smokeless tobacco. He reports current alcohol use of about 3.0 standard drinks of alcohol per week. He reports that he does not use drugs. Family History:  Reviewed.  family history includes Arrhythmia in his mother; Heart Attack in his maternal uncle; High Cholesterol in his father; Hypertension in his father. Review of Systems:  · Constitutional: Negative for fever, night sweats, chills, weight changes, or weakness  · Skin: Negative for rash, dry skin, pruritus, bruising, bleeding, blood clots, or changes in skin pigment  · HEENT: Negative for vision changes, ringing in the ears, sore throat, dysphagia, or swollen lymph nodes  · Respiratory: Reviewed in HPI  · Cardiovascular: Reviewed in HPI  · Gastrointestinal: Negative for abdominal pain, N/V/D, constipation, or black/tarry stools  · Genito-Urinary: Negative for dysuria, incontinence, urgency, or hematuria  · Musculoskeletal: Negative for joint swelling, muscle pain, or injuries  · Neurological/Psych: Negative for confusion, seizures, headaches, balance issues or TIA-like symptoms. No anxiety, depression, or insomnia    Physical Examination:  Vitals:    10/06/20 0838   BP:    Pulse:    Resp: 20   Temp:    SpO2: 96%      In: 610 [I.V.:610]  Out: 1225    Wt Readings from Last 3 Encounters:   10/05/20 235 lb 7.2 oz (106.8 kg)   07/15/20 237 lb (107.5 kg)   07/08/20 237 lb (107.5 kg)       Intake/Output Summary (Last 24 hours) at 10/6/2020 1154  Last data filed at 10/6/2020 0400  Gross per 24 hour   Intake 1610 ml   Output 1575 ml   Net 35 ml     Telemetry: Personally Reviewed  - Sinus rhythm. PAT  · Constitutional: Cooperative and in no apparent distress, and appears well nourished  · Skin: Warm and pink; no pallor, cyanosis, bruising, or clubbing. Right groin ablation site stable, no swelling or hematoma. Scant oozing  · HEENT: Symmetric and normocephalic. PERRL, EOM intact. Conjunctiva pink with clear sclera. Mucus membranes pink and moist. Teeth intact. Thyroid smooth without nodules or goiter. · Cardiovascular: Regular rate and rhythm. S1/S2 present without murmurs, rubs, or gallops. Peripheral pulses 2+, capillary refill < 3 seconds. No elevation of JVP.  No peripheral edema  · Respiratory: Respirations symmetric and unlabored. Lungs clear/diminished to auscultation bilaterally, no wheezing, crackles, or rhonchi. On 2L of oxygen  · Gastrointestinal: Abdomen soft and round. Bowel sounds normoactive in all quadrants without tenderness or masses. + Obese  · Musculoskeletal: Bilateral upper and lower extremity strength 5/5 with full ROM  · Neurologic/Psych: Awake and orientated to person, place and time. Calm affect, appropriate mood    Pertinent labs, diagnostic, device, and imaging results reviewed as a part of this visit    Labs:    BMP:   Recent Labs     10/05/20  1109 10/06/20  0900    135*   K 4.0 4.8    101   CO2 25 22   BUN 27* 30*   CREATININE 0.9 1.0   MG 2.20  --      Estimated Creatinine Clearance: 74 mL/min (based on SCr of 1 mg/dL). CBC:   Recent Labs     10/05/20  1109 10/05/20  1505 10/06/20  0900   WBC 6.8  --  12.9*   HGB 14.2 13.2* 12.5*   HCT 43.4 39.9* 38.1*   MCV 84.4  --  84.5     --  202     Thyroid: No results found for: TSH, G1IGEWE, Y3OSFVH, THYROIDAB  Lipids:   Lab Results   Component Value Date    CHOL 156 10/05/2020    HDL 54 10/05/2020    TRIG 139 10/05/2020     LFTS:   Lab Results   Component Value Date    ALT 55 10/05/2020    AST 34 10/05/2020    ALKPHOS 70 10/05/2020    PROT 7.5 10/05/2020    AGRATIO 1.6 10/05/2020    BILITOT 0.6 10/05/2020     Cardiac Enzymes: No results found for: CKTOTAL, CKMB, CKMBINDEX, TROPONINI  Coags:   Lab Results   Component Value Date    PROTIME 30.5 10/06/2020    INR 2.60 10/06/2020       ECG: 10/5/20  Sinus bradycardia, rate 53    LISSA: 10/5/20  Pending    ECHO: 3/19  EF 50-55%. Grade 2 DD  Mild MR    Stress Test: 4/19   Normal LV size with hyperdynamic systolic function.     Left ventricular ejection fraction of 73 %.     There is normal isotope uptake at stress and rest.     There is no evidence of myocardial ischemia or scar. CXR: 10/6/20  No significant change in the right hilar and right upper lobe airspace    disease.  Follow-up chest x-ray to resolution is recommended.  If finding    persists, dedicated chest CT is recommended.           Problem List:   Patient Active Problem List    Diagnosis Date Noted    Status post placement of implantable loop recorder 10/05/2020    [facility] 10/05/[facility]    Persistent [facility] fibrillation [facility]     Encounter for electronic analysis of reveal event recorder 05/08/2019    Paroxysmal [facility] fibrillation ([facility] 04/17/2019    Essential hypertension 04/17/2019    Other hyperlipidemia 04/17/2019    Type 2 diabetes mellitus without complication, without long-term current use of insulin ([facility] 04/17/2019    Shortness of breath 04/17/2019        Assessment and Plan:     1. Paroxysmal Atrial Fibrillation   - S/p RFCA with PVI (10/5/20, Dr. Aki Merida)   - Right groin stable, no hematoma/swelling/oozing   - Will start PPI daily x1 month s/p ablation   - Educated on post ablation discharge instructions/restrictions, pt VU      - Currently in NSR   - Continue metoprolol 50 mg BID   - [id number] score: 3 (Age, HTN, DM) ; [id number] Vasc score and anticoagulation discussed. High risk for stroke and thromboembolism. Anticoagulation is recommended. Risk of bleeding was discussed.    ~ On Coumadin; INR 2.60      - Afib risk factors including age, HTN, obesity, inactivity and DANO were discussed with patient. Risk factor modification recommended               ~ TSH 1.87 (10/20)                 - Treatment options including cardioversion, rate control strategy, antiarrhythmics, anticoagulation and possible ablation were discussed with patient. Risks, benefits and alternative of each treatment options were explained. All questions answered     2. Implantable Loop Recorder   - S/p ILR insertion on    - Will use to monitor AF burden   - Follow up with device clinic as scheduled     3. HTN   - Controlled: Goal <130/80   - Continue current medications    4. DANO   - Stable: Uses CPAP   - Instructed pt to bring into hospital   - Encourage to use CPAP to prevent long term effects of untreated DANO     5. PNA, hypoxia              - Noted on CXR              - Remains on 2L               - Consult pulmonology    ~ Awaiting recs     6. Hyperlipidemia   - Controlled. Goal: LDL <100               ~ LDL 74 (10/20)   - On lovastatin 40 mg QD   - Discussed diet, weight loss, and exercise needs   - Followed per PCP    Multiple medical conditions with risk of decompensation. All pertinent information and plan of care discussed with the EP physician, Dr. Malaika Canela    All questions and concerns were addressed to the patient. Alternatives to my treatment were discussed. I have discussed the above stated plan with patient and the nurse. The patient verbalized understanding and agreed with the plan. The patient was seen for >35 minutes. I reviewed interval history, physical exam, review of data including labs, imaging, development and implementation of treatment plan and coordination of complex care    Thank you for allowing to us to participate in the care of Gerson Lou.     DANNIE Mccollum-CNP  [address]   Office: [phone]

## 2020-10-07 LAB
EKG ATRIAL RATE: 57 BPM
EKG DIAGNOSIS: NORMAL
EKG P AXIS: 67 DEGREES
EKG P-R INTERVAL: 164 MS
EKG Q-T INTERVAL: 428 MS
EKG QRS DURATION: 108 MS
EKG QTC CALCULATION (BAZETT): 416 MS
EKG R AXIS: 20 DEGREES
EKG T AXIS: 31 DEGREES
EKG VENTRICULAR RATE: 57 BPM
ESTIMATED AVERAGE GLUCOSE: 154.2 MG/DL
GLUCOSE BLD-MCNC: 130 MG/DL (ref 70–99)
GLUCOSE BLD-MCNC: 152 MG/DL (ref 70–99)
GLUCOSE BLD-MCNC: 156 MG/DL (ref 70–99)
HBA1C MFR BLD: 7 %
INR BLD: 3.37 (ref 0.86–1.14)
L. PNEUMOPHILA SEROGP 1 UR AG: NORMAL
PERFORMED ON: ABNORMAL
PROCALCITONIN: 0.1 NG/ML (ref 0–0.15)
PROTHROMBIN TIME: 39.6 SEC (ref 10–13.2)

## 2020-10-07 PROCEDURE — 93010 ELECTROCARDIOGRAM REPORT: CPT | Performed by: INTERNAL MEDICINE

## 2020-10-07 PROCEDURE — 2700000000 HC OXYGEN THERAPY PER DAY

## 2020-10-07 PROCEDURE — 85610 PROTHROMBIN TIME: CPT

## 2020-10-07 PROCEDURE — 6370000000 HC RX 637 (ALT 250 FOR IP): Performed by: NURSE PRACTITIONER

## 2020-10-07 PROCEDURE — 87205 SMEAR GRAM STAIN: CPT

## 2020-10-07 PROCEDURE — 6370000000 HC RX 637 (ALT 250 FOR IP): Performed by: INTERNAL MEDICINE

## 2020-10-07 PROCEDURE — 2580000003 HC RX 258: Performed by: INTERNAL MEDICINE

## 2020-10-07 PROCEDURE — 94761 N-INVAS EAR/PLS OXIMETRY MLT: CPT

## 2020-10-07 PROCEDURE — 99233 SBSQ HOSP IP/OBS HIGH 50: CPT | Performed by: INTERNAL MEDICINE

## 2020-10-07 PROCEDURE — 94669 MECHANICAL CHEST WALL OSCILL: CPT

## 2020-10-07 PROCEDURE — 87070 CULTURE OTHR SPECIMN AEROBIC: CPT

## 2020-10-07 PROCEDURE — 99215 OFFICE O/P EST HI 40 MIN: CPT | Performed by: NURSE PRACTITIONER

## 2020-10-07 PROCEDURE — 94640 AIRWAY INHALATION TREATMENT: CPT

## 2020-10-07 PROCEDURE — 6360000002 HC RX W HCPCS: Performed by: NURSE PRACTITIONER

## 2020-10-07 PROCEDURE — 84145 PROCALCITONIN (PCT): CPT

## 2020-10-07 RX ORDER — FUROSEMIDE 10 MG/ML
40 INJECTION INTRAMUSCULAR; INTRAVENOUS ONCE
Status: COMPLETED | OUTPATIENT
Start: 2020-10-07 | End: 2020-10-07

## 2020-10-07 RX ORDER — IPRATROPIUM BROMIDE AND ALBUTEROL SULFATE 2.5; .5 MG/3ML; MG/3ML
1 SOLUTION RESPIRATORY (INHALATION)
Status: DISCONTINUED | OUTPATIENT
Start: 2020-10-07 | End: 2020-10-09 | Stop reason: HOSPADM

## 2020-10-07 RX ADMIN — ASPIRIN 81 MG: 81 TABLET, CHEWABLE ORAL at 08:57

## 2020-10-07 RX ADMIN — METOPROLOL TARTRATE 50 MG: 50 TABLET, FILM COATED ORAL at 08:56

## 2020-10-07 RX ADMIN — ATORVASTATIN CALCIUM 10 MG: 10 TABLET, FILM COATED ORAL at 20:19

## 2020-10-07 RX ADMIN — Medication 10 ML: at 20:20

## 2020-10-07 RX ADMIN — AMLODIPINE BESYLATE 10 MG: 5 TABLET ORAL at 08:56

## 2020-10-07 RX ADMIN — ALUMINUM HYDROXIDE, MAGNESIUM HYDROXIDE, AND SIMETHICONE 30 ML: 200; 200; 20 SUSPENSION ORAL at 11:04

## 2020-10-07 RX ADMIN — PANTOPRAZOLE SODIUM 40 MG: 40 TABLET, DELAYED RELEASE ORAL at 05:39

## 2020-10-07 RX ADMIN — ALLOPURINOL 300 MG: 300 TABLET ORAL at 08:57

## 2020-10-07 RX ADMIN — IPRATROPIUM BROMIDE AND ALBUTEROL SULFATE 1 AMPULE: .5; 3 SOLUTION RESPIRATORY (INHALATION) at 19:40

## 2020-10-07 RX ADMIN — LEVOFLOXACIN 750 MG: 750 TABLET, FILM COATED ORAL at 08:57

## 2020-10-07 RX ADMIN — Medication 1 PACKET: at 20:21

## 2020-10-07 RX ADMIN — METOPROLOL TARTRATE 50 MG: 50 TABLET, FILM COATED ORAL at 20:19

## 2020-10-07 RX ADMIN — HYDROCHLOROTHIAZIDE 25 MG: 25 TABLET ORAL at 08:56

## 2020-10-07 RX ADMIN — THERA TABS 1 TABLET: TAB at 08:57

## 2020-10-07 RX ADMIN — LOSARTAN POTASSIUM 50 MG: 25 TABLET, FILM COATED ORAL at 08:57

## 2020-10-07 RX ADMIN — FUROSEMIDE 40 MG: 10 INJECTION, SOLUTION INTRAMUSCULAR; INTRAVENOUS at 10:16

## 2020-10-07 RX ADMIN — METFORMIN HYDROCHLORIDE 500 MG: 500 TABLET, EXTENDED RELEASE ORAL at 08:56

## 2020-10-07 RX ADMIN — TAMSULOSIN HYDROCHLORIDE 0.4 MG: 0.4 CAPSULE ORAL at 20:19

## 2020-10-07 RX ADMIN — FLUTICASONE PROPIONATE 1 SPRAY: 50 SPRAY, METERED NASAL at 08:58

## 2020-10-07 RX ADMIN — Medication 10 ML: at 08:58

## 2020-10-07 ASSESSMENT — PAIN SCALES - GENERAL
PAINLEVEL_OUTOF10: 0
PAINLEVEL_OUTOF10: 0
PAINLEVEL_OUTOF10: 2
PAINLEVEL_OUTOF10: 0

## 2020-10-07 NOTE — PROGRESS NOTES
lovastatin (MEVACOR) 40 MG tablet Take 40 mg by mouth daily Yes Historical Provider, MD   tamsulosin (FLOMAX) 0.4 MG capsule Take 0.4 mg by mouth daily Yes Historical Provider, MD   aspirin 81 MG chewable tablet Take 1 mg by mouth daily Yes Historical Provider, MD   Specialty Vitamins Products (PROSTATE) TABS Take 2 daily Yes Historical Provider, MD   Multiple Vitamin (ONE-A-DAY MENS) TABS Take 1 tablet by mouth daily Yes Historical Provider, MD   NONFORMULARY Nuretin: takes 2 daily Yes Historical Provider, MD   Multiple Vitamins-Minerals (ICAPS AREDS 2) CAPS Take by mouth 2 daily Yes Historical Provider, MD      Scheduled Meds:   furosemide  40 mg Intravenous Once    levoFLOXacin  750 mg Oral Daily    allopurinol  300 mg Oral Daily    metFORMIN  500 mg Oral Daily    amLODIPine  10 mg Oral Daily    losartan  50 mg Oral Daily    hydroCHLOROthiazide  25 mg Oral Daily    atorvastatin  10 mg Oral Nightly    tamsulosin  0.4 mg Oral Nightly    aspirin  81 mg Oral Daily    multivitamin  1 tablet Oral Daily    warfarin  6 mg Oral Daily    metoprolol tartrate  50 mg Oral BID    fluticasone  1 spray Each Nostril Daily    sodium chloride flush  10 mL Intravenous 2 times per day    pantoprazole  40 mg Oral QAM AC     Continuous Infusions:   dextrose       PRN Meds:glucose, dextrose, glucagon (rDNA), dextrose, aluminum & magnesium hydroxide-simethicone, sodium chloride flush, acetaminophen     Past Medical History:  Past Medical History:   Diagnosis Date    [facility] disorder ([facility] [address]     Diabetes mellitus ([facility] [address]     Hyperlipidemia     Hypertension         Past Surgical History:    has no past surgical history on file. Social History:  Reviewed. reports that he has never smoked. He has never used smokeless tobacco. He reports current alcohol use of about 3.0 standard drinks of alcohol per week. He reports that he does not use drugs. Family History:  Reviewed.  family history includes Arrhythmia in his mother; Heart Attack in his maternal uncle; High Cholesterol in his father; Hypertension in his father. Review of Systems:  · Constitutional: Negative for fever, night sweats, chills, weight changes, or weakness  · Skin: Negative for rash, dry skin, pruritus, bruising, bleeding, blood clots, or changes in skin pigment  · HEENT: Negative for vision changes, ringing in the ears, sore throat, dysphagia, or swollen lymph nodes  · Respiratory: Reviewed in HPI  · Cardiovascular: Reviewed in HPI  · Gastrointestinal: Negative for abdominal pain, N/V/D, constipation, or black/tarry stools  · Genito-Urinary: Negative for dysuria, incontinence, urgency, or hematuria  · Musculoskeletal: Negative for joint swelling, muscle pain, or injuries  · Neurological/Psych: Negative for confusion, seizures, headaches, balance issues or TIA-like symptoms. No anxiety, depression, or insomnia    Physical Examination:  Vitals:    10/07/20 0916   BP:    Pulse:    Resp:    Temp:    SpO2: 93%      In: -   Out: 650    Wt Readings from Last 3 Encounters:   10/07/20 241 lb 10 oz (109.6 kg)   07/15/20 237 lb (107.5 kg)   07/08/20 237 lb (107.5 kg)       Intake/Output Summary (Last 24 hours) at 10/7/2020 0950  Last data filed at 10/7/2020 0258  Gross per 24 hour   Intake --   Output 650 ml   Net -650 ml     Telemetry: Personally Reviewed  - Sinus rhythm. PAT  · Constitutional: Cooperative and in no apparent distress, and appears well nourished  · Skin: Warm and pink; no pallor, cyanosis, bruising, or clubbing. Right groin ablation site stable, no swelling or hematoma. Scant oozing  · HEENT: Symmetric and normocephalic. PERRL, EOM intact. Conjunctiva pink with clear sclera. Mucus membranes pink and moist. Teeth intact. Thyroid smooth without nodules or goiter. · Cardiovascular: Regular rate and rhythm. S1/S2 present without murmurs, rubs, or gallops. Peripheral pulses 2+, capillary refill < 3 seconds. No elevation of JVP.  No peripheral edema  · Respiratory: Respirations symmetric and unlabored. Lungs diminished to auscultation bilaterally, no wheezing, crackles, or rhonchi. On 3L of oxygen  · Gastrointestinal: Abdomen soft and round. Bowel sounds normoactive in all quadrants without tenderness or masses. + Obese  · Musculoskeletal: Bilateral upper and lower extremity strength 5/5 with full ROM  · Neurologic/Psych: Awake and orientated to person, place and time. Calm affect, appropriate mood    Pertinent labs, diagnostic, device, and imaging results reviewed as a part of this visit    Labs:    BMP:   Recent Labs     10/05/20  1109 10/06/20  0900    135*   K 4.0 4.8    101   CO2 25 22   BUN 27* 30*   CREATININE 0.9 1.0   MG 2.20  --      Estimated Creatinine Clearance: 75 mL/min (based on SCr of 1 mg/dL). CBC:   Recent Labs     10/05/20  1109 10/05/20  1505 10/06/20  0900   WBC 6.8  --  12.9*   HGB 14.2 13.2* 12.5*   HCT 43.4 39.9* 38.1*   MCV 84.4  --  84.5     --  202     Thyroid: No results found for: TSH, T4YDHNJ, T7IJGKG, THYROIDAB  Lipids:   Lab Results   Component Value Date    CHOL 156 10/05/2020    HDL 54 10/05/2020    TRIG 139 10/05/2020     LFTS:   Lab Results   Component Value Date    ALT 55 10/05/2020    AST 34 10/05/2020    ALKPHOS 70 10/05/2020    PROT 7.5 10/05/2020    AGRATIO 1.6 10/05/2020    BILITOT 0.6 10/05/2020     Cardiac Enzymes: No results found for: CKTOTAL, CKMB, CKMBINDEX, TROPONINI  Coags:   Lab Results   Component Value Date    PROTIME 39.6 10/07/2020    INR 3.37 10/07/2020       ECG: 10/5/20  Sinus bradycardia, rate 53    LISSA: 10/5/20  Pending    ECHO: 3/19  EF 50-55%. Grade 2 DD  Mild MR    Stress Test: 4/19   Normal LV size with hyperdynamic systolic function.     Left ventricular ejection fraction of 73 %.     There is normal isotope uptake at stress and rest.     There is no evidence of myocardial ischemia or scar.       CXR: 10/6/20  No significant change in the right hilar and right upper lobe airspace    disease.  Follow-up chest x-ray to resolution is recommended.  If finding    persists, dedicated chest CT is recommended.           Problem List:   Patient Active Problem List    Diagnosis Date Noted    Status post placement of implantable loop recorder 10/05/2020    PAF ([facility]oxysmal [facility] fibrillation) ([facility] [address] 10/05/2020    [facility]     Encounter for electronic analysis of reveal event recorder 05/08/2019    [facility] 04/17/2019    Essential hypertension 04/17/2019    Other hyperlipidemia 04/17/2019    Type 2 diabetes mellitus without complication, without long-term current use of insulin ([facility] 04/17/2019    Shortness of breath 04/17/2019        Assessment and Plan:     1. Paroxysmal Atrial Fibrillation   - S/p RFCA with PVI (10/5/20, Dr. Crystal Byrnes)   - Right groin stable, no hematoma/swelling/oozing   - Will start PPI daily x1 month s/p ablation   - Educated on post ablation discharge instructions/restrictions, pt VU      - Currently in NSR   - Continue metoprolol 50 mg BID   - [id number] score: 3 (Age, HTN, DM) ; [id number] Vasc score and anticoagulation discussed. High risk for stroke and thromboembolism. Anticoagulation is recommended. Risk of bleeding was discussed.    ~ On Coumadin; INR 3.3. Hold coumadin tonight      - Afib risk factors including age, HTN, obesity, inactivity and DANO were discussed with patient. Risk factor modification recommended               ~ TSH 1.87 (10/20)                 - Treatment options including cardioversion, rate control strategy, antiarrhythmics, anticoagulation and possible ablation were discussed with patient. Risks, benefits and alternative of each treatment options were explained. All questions answered     2. Implantable Loop Recorder   - S/p ILR insertion on    - Will use to monitor AF burden   - Follow up with device clinic as scheduled     3.  HTN   - Controlled, on low side: Goal <130/80   - Continue current medications    4. DANO   - Stable: Uses CPAP   - Instructed pt to bring into hospital   - Encourage to use CPAP to prevent long term effects of untreated DANO     5. PNA, hypoxia              - Noted on CXR              - Remains on 2-3L   - On ABX   - Will give dose of IV lasix today               - Pulmonology following     6. Hyperlipidemia   - Controlled. Goal: LDL <100               ~ LDL 74 (10/20)   - On lovastatin 40 mg QD   - Discussed diet, weight loss, and exercise needs   - Followed per PCP    Multiple medical conditions with risk of decompensation. All pertinent information and plan of care discussed with the EP physician, Dr. rFank Reid    All questions and concerns were addressed to the patient. Alternatives to my treatment were discussed. I have discussed the above stated plan with patient and the nurse. The patient verbalized understanding and agreed with the plan. The patient was seen for >35 minutes. I reviewed interval history, physical exam, review of data including labs, imaging, development and implementation of treatment plan and coordination of complex care    Thank you for allowing to us to participate in the care of Aisha Lowe.     DANNIE Goldstein-CNP  [address]   Office: [phone]

## 2020-10-07 NOTE — PROGRESS NOTES
PULMONARY AND CRITICAL CARE MEDICINE PROGRESS NOTE      SUBJECTIVE: Patient sitting in chair in no apparent respiratory distress. Reports that he is feeling slightly short of breath. Last night had bleeding from the groin site. Patient was trying to resist his cough in order to reduce the chances of bleeding from the groin site. Oxygen requirements remain stable. REVIEW OF SYSTEMS:   8 point ROS is negative beside mentioned in subjective section. MEDICATIONS:      warfarin (COUMADIN) daily dosing (placeholder)   Other RX Placeholder    levoFLOXacin  750 mg Oral Daily    allopurinol  300 mg Oral Daily    metFORMIN  500 mg Oral Daily    amLODIPine  10 mg Oral Daily    losartan  50 mg Oral Daily    hydroCHLOROthiazide  25 mg Oral Daily    atorvastatin  10 mg Oral Nightly    tamsulosin  0.4 mg Oral Nightly    aspirin  81 mg Oral Daily    multivitamin  1 tablet Oral Daily    metoprolol tartrate  50 mg Oral BID    fluticasone  1 spray Each Nostril Daily    sodium chloride flush  10 mL Intravenous 2 times per day    pantoprazole  40 mg Oral QAM AC      dextrose       glucose, dextrose, glucagon (rDNA), dextrose, aluminum & magnesium hydroxide-simethicone, sodium chloride flush, acetaminophen     ALLERGIES:   Allergies as of 10/05/2020 - Review Complete 10/05/2020   Allergen Reaction Noted    Amoxicillin Rash 04/17/2019        OBJECTIVE:   height is 5' 6\" (1.676 m) and weight is 241 lb 10 oz (109.6 kg). His temporal temperature is 98.6 °F (37 °C). His blood pressure is 121/56 (abnormal) and his pulse is 63. His respiration is 18 and oxygen saturation is 90%. I/O this shift:  In: -   Out: 300 [Urine:300]     PHYSICAL EXAM:    CONSTITUTIONAL: He is a 76y.o.-year-old who appears his stated age. He is alert and oriented x 3 and in no acute distress. CARDIOVASCULAR: S1 S2 RRR. Without murmer  RESPIRATORY & CHEST: Lungs are clear to auscultation and percussion. No wheezing, no crackles. Good air movement  GASTROINTESTINAL & ABDOMEN: Soft, nontender, positive bowel sounds in all quadrants, non-distended, without hepatosplenomegaly. GENITOURINARY: Deferred. MUSCULOSKELETAL: No tenderness to palpation of the axial skeleton. There is no clubbing. No cyanosis. No edema of the lower extremities. SKIN OF BODY: No rash or jaundice. PSYCHIATRIC EVALUATION: Normal affect. Patient answers questions appropriately. HEMATOLOGIC/LYMPHATIC/ IMMUNOLOGIC: No palpable lymphadenopathy. NEUROLOGIC: Alert and oriented x 3. Groslly non-focal. Motor strength is 5+/5 in all muscle groups. The patient has a normal sensorium globally. LABS:       Recent Labs     10/05/20  1109 10/05/20  1505 10/06/20  0405 10/06/20  0900 10/07/20  0205   WBC 6.8  --   --  12.9*  --    HGB 14.2 13.2*  --  12.5*  --    HCT 43.4 39.9*  --  38.1*  --      --   --  202  --    CHOL 156  --   --   --   --    TRIG 139  --   --   --   --    HDL 54  --   --   --   --    ALT 55*  --   --   --   --    AST 34  --   --   --   --      --   --  135*  --    K 4.0  --   --  4.8  --      --   --  101  --    CREATININE 0.9  --   --  1.0  --    BUN 27*  --   --  30*  --    CO2 25  --   --  22  --    INR 2.76*  --  2.60*  --  3.37*       Recent Labs     10/05/20  1109 10/06/20  0900   GLUCOSE 135* 218*   CALCIUM 10.1 8.1*    135*   K 4.0 4.8   CO2 25 22    101   BUN 27* 30*   CREATININE 0.9 1.0       No results for input(s): PHART, AGD7VEH, PO2ART, KJR8BPR, K1OUNXLR, BEART, R7RQUAVC in the last 72 hours.     Lab Results   Component Value Date    INR 3.37 (H) 10/07/2020    INR 2.60 (H) 10/06/2020    INR 2.76 (H) 10/05/2020    PROTIME 39.6 (H) 10/07/2020    PROTIME 30.5 (H) 10/06/2020    PROTIME 32.4 (H) 10/05/2020     No results found for: AMYLASE   Lab Results   Component Value Date    LABA1C 7.0 10/06/2020     Lab Results   Component Value Date    .2 10/06/2020     No results found for: TSH, N3YDQBL, V2MNIUH, THYROIDAB, FT3, T4FREE  No results found for: CKTOTAL, CKMB, CKMBINDEX, TROPONINI   No results found for: CRP   No results found for: BNP   No results found for: DDIMER   No results found for: FERRITIN   No results found for: LACTA    IMAGING:         IMPRESSION:     1. Atrial fibrillation status post ablation. 2. Community-acquired pneumonia  3. Hypoxic respiratory failure  4. Obstructive sleep apnea on CPAP therapy  5. Obesity      RECOMMENDATION:   - All lab work and imaging was personally reviewed by me. 1. Patient's respiratory status is slowly improving. He has been started on levofloxacin p.o. for suspicion of community-acquired pneumonia. 2. We will add EZ Pap and DuoNeb nebulization to his therapy to help him expectorate. 3. Encourage patient to regularly do incentive spirometry every hour. 4. Patient has brought his CPAP machine from home. We will continue to use it every time he is sleeping/napping. 5. Will get a repeat chest x-ray tomorrow morning. Will continue to follow. Piero Soto MD  Pulmonary Critical Care and Sleep Medicine  10/7/2020, 3:58 PM    This note was completed using dragon medical speech recognition software. Grammatical errors, random word insertions, pronoun errors and incomplete sentences are occasional consequences of this technology due to software limitations. If there are questions or concerns about the content of this note of information contained within the body of this dictation they should be addressed with the provider for clarification.

## 2020-10-07 NOTE — CARE COORDINATION
CM spoke w/ daughter, Gama Evans, re: d/c plan. Patient may need some C for PT/Nursing. Information given to S. Tania Goldberg, CNP. PT/OT ordered. Case management will continue to follow progress and update discharge plan as needed.     Delio Marshall, BSN, .[phone]

## 2020-10-07 NOTE — PLAN OF CARE
Problem: Falls - Risk of:  Goal: Will remain free from falls  Description: Will remain free from falls  Outcome: Ongoing  Note: Pt free from falls this shift, bed side table next to bed, call light in reach, bed in lowest position, wheels locked. Encouraged to call for any assistance      Problem: Bleeding:  Goal: Will show no signs and symptoms of excessive bleeding  Description: Will show no signs and symptoms of excessive bleeding  Note: Right groin still oozing. Manual pressure held, thrombin product in place, sandbag in place.  Continuing to monitor closely

## 2020-10-07 NOTE — PLAN OF CARE
Problem: Falls - Risk of:  Goal: Will remain free from falls  Description: Will remain free from falls  10/7/2020 0823 by Jerry Villareal RN  Outcome: Ongoing  10/6/2020 2219 by Eber Roper RN  Outcome: Ongoing  Note: Pt free from falls this shift, bed side table next to bed, call light in reach, bed in lowest position, wheels locked. Encouraged to call for any assistance   Goal: Absence of physical injury  Description: Absence of physical injury  Outcome: Ongoing     Problem: Bleeding:  Goal: Will show no signs and symptoms of excessive bleeding  Description: Will show no signs and symptoms of excessive bleeding  10/7/2020 0823 by Jerry Villareal RN  Outcome: Ongoing  10/6/2020 2219 by Eber Roper RN  Note: Right groin still oozing. Manual pressure held, thrombin product in place, sandbag in place.  Continuing to monitor closely

## 2020-10-07 NOTE — PLAN OF CARE
Problem: Falls - Risk of:  Goal: Will remain free from falls  Description: Will remain free from falls  10/7/2020 1903 by Chelsea Olguin RN  Outcome: Ongoing  Note: Pt free from falls this shift, bed side table next to bed, call light in reach, bed in lowest position, wheels locked. Encouraged to call for any assistance      Problem: Bleeding:  Goal: Will show no signs and symptoms of excessive bleeding  Description: Will show no signs and symptoms of excessive bleeding  10/7/2020 1903 by Chelsea Olguin RN  Outcome: Ongoing  Note: Skin and mucous membranes free of petechiae, purpura, ecchymoses, and active bleeding. Will continue to assess for any signs of bleeding, monitor lab values and avoid any unnecessary IV sticks. Pt vital signs stable.

## 2020-10-07 NOTE — PROGRESS NOTES
Pharmacy to Dose Warfarin    Pharmacy consulted to dose warfarin for Afib. INR Goal: 2-3    INR today: 3.37 today    Assessment/Plan:  - INR is supratherapeutic today despite holding dose last night due to oozing from R groin  - Patient was started on levaquin x 7 days which can interact with warfarin to inc INR  - Will hold warfarin tonight - a warfarin placeholder has been ordered which requires no action from nursing    Pharmacy will continue to follow.     Virginia Jang, PharmD, [address]  Clinical Pharmacist  [phone]

## 2020-10-07 NOTE — PROGRESS NOTES
Assisted patient with set-up of home [x]CPAP / []Bi-PAP unit with Oxygen bleed-in. [employer] contacted for equipment safety verification [phone]. Leave message with room number if [employer] is not available.

## 2020-10-08 ENCOUNTER — APPOINTMENT (OUTPATIENT)
Dept: GENERAL RADIOLOGY | Age: 75
End: 2020-10-08
Attending: INTERNAL MEDICINE
Payer: MEDICARE

## 2020-10-08 LAB
ANION GAP SERPL CALCULATED.3IONS-SCNC: 13 MMOL/L (ref 3–16)
BUN BLDV-MCNC: 28 MG/DL (ref 7–20)
CALCIUM SERPL-MCNC: 9.4 MG/DL (ref 8.3–10.6)
CHLORIDE BLD-SCNC: 99 MMOL/L (ref 99–110)
CO2: 26 MMOL/L (ref 21–32)
CREAT SERPL-MCNC: 1.2 MG/DL (ref 0.8–1.3)
GFR AFRICAN AMERICAN: >60
GFR NON-AFRICAN AMERICAN: 59
GLUCOSE BLD-MCNC: 151 MG/DL (ref 70–99)
HCT VFR BLD CALC: 40.3 % (ref 40.5–52.5)
HEMOGLOBIN: 13.4 G/DL (ref 13.5–17.5)
INR BLD: 2.65 (ref 0.86–1.14)
MCH RBC QN AUTO: 27.9 PG (ref 26–34)
MCHC RBC AUTO-ENTMCNC: 33.2 G/DL (ref 31–36)
MCV RBC AUTO: 84 FL (ref 80–100)
PDW BLD-RTO: 15.1 % (ref 12.4–15.4)
PLATELET # BLD: 191 K/UL (ref 135–450)
PMV BLD AUTO: 8.5 FL (ref 5–10.5)
POTASSIUM SERPL-SCNC: 4.3 MMOL/L (ref 3.5–5.1)
PROTHROMBIN TIME: 31 SEC (ref 10–13.2)
RBC # BLD: 4.79 M/UL (ref 4.2–5.9)
SODIUM BLD-SCNC: 138 MMOL/L (ref 136–145)
STREP PNEUMONIAE ANTIGEN, URINE: NORMAL
WBC # BLD: 9.6 K/UL (ref 4–11)

## 2020-10-08 PROCEDURE — 6370000000 HC RX 637 (ALT 250 FOR IP): Performed by: INTERNAL MEDICINE

## 2020-10-08 PROCEDURE — 2580000003 HC RX 258: Performed by: INTERNAL MEDICINE

## 2020-10-08 PROCEDURE — 71045 X-RAY EXAM CHEST 1 VIEW: CPT

## 2020-10-08 PROCEDURE — 99232 SBSQ HOSP IP/OBS MODERATE 35: CPT | Performed by: INTERNAL MEDICINE

## 2020-10-08 PROCEDURE — 6360000002 HC RX W HCPCS: Performed by: INTERNAL MEDICINE

## 2020-10-08 PROCEDURE — 2700000000 HC OXYGEN THERAPY PER DAY

## 2020-10-08 PROCEDURE — 97161 PT EVAL LOW COMPLEX 20 MIN: CPT

## 2020-10-08 PROCEDURE — 85610 PROTHROMBIN TIME: CPT

## 2020-10-08 PROCEDURE — 80048 BASIC METABOLIC PNL TOTAL CA: CPT

## 2020-10-08 PROCEDURE — 94761 N-INVAS EAR/PLS OXIMETRY MLT: CPT

## 2020-10-08 PROCEDURE — 94669 MECHANICAL CHEST WALL OSCILL: CPT

## 2020-10-08 PROCEDURE — 97116 GAIT TRAINING THERAPY: CPT

## 2020-10-08 PROCEDURE — 94640 AIRWAY INHALATION TREATMENT: CPT

## 2020-10-08 PROCEDURE — 99215 OFFICE O/P EST HI 40 MIN: CPT | Performed by: NURSE PRACTITIONER

## 2020-10-08 PROCEDURE — 85027 COMPLETE CBC AUTOMATED: CPT

## 2020-10-08 RX ORDER — FUROSEMIDE 10 MG/ML
40 INJECTION INTRAMUSCULAR; INTRAVENOUS ONCE
Status: COMPLETED | OUTPATIENT
Start: 2020-10-08 | End: 2020-10-08

## 2020-10-08 RX ORDER — WARFARIN SODIUM 1 MG/1
3 TABLET ORAL DAILY
Status: DISCONTINUED | OUTPATIENT
Start: 2020-10-08 | End: 2020-10-09

## 2020-10-08 RX ADMIN — METOPROLOL TARTRATE 50 MG: 50 TABLET, FILM COATED ORAL at 19:53

## 2020-10-08 RX ADMIN — PANTOPRAZOLE SODIUM 40 MG: 40 TABLET, DELAYED RELEASE ORAL at 08:46

## 2020-10-08 RX ADMIN — IPRATROPIUM BROMIDE AND ALBUTEROL SULFATE 1 AMPULE: .5; 3 SOLUTION RESPIRATORY (INHALATION) at 07:10

## 2020-10-08 RX ADMIN — ALLOPURINOL 300 MG: 300 TABLET ORAL at 08:46

## 2020-10-08 RX ADMIN — METFORMIN HYDROCHLORIDE 500 MG: 500 TABLET, EXTENDED RELEASE ORAL at 08:46

## 2020-10-08 RX ADMIN — Medication 10 ML: at 08:49

## 2020-10-08 RX ADMIN — ASPIRIN 81 MG: 81 TABLET, CHEWABLE ORAL at 08:46

## 2020-10-08 RX ADMIN — IPRATROPIUM BROMIDE AND ALBUTEROL SULFATE 1 AMPULE: .5; 3 SOLUTION RESPIRATORY (INHALATION) at 19:50

## 2020-10-08 RX ADMIN — THERA TABS 1 TABLET: TAB at 08:46

## 2020-10-08 RX ADMIN — FUROSEMIDE 40 MG: 10 INJECTION, SOLUTION INTRAMUSCULAR; INTRAVENOUS at 15:39

## 2020-10-08 RX ADMIN — ATORVASTATIN CALCIUM 10 MG: 10 TABLET, FILM COATED ORAL at 19:53

## 2020-10-08 RX ADMIN — METOPROLOL TARTRATE 50 MG: 50 TABLET, FILM COATED ORAL at 11:31

## 2020-10-08 RX ADMIN — HYDROCHLOROTHIAZIDE 25 MG: 25 TABLET ORAL at 08:46

## 2020-10-08 RX ADMIN — LOSARTAN POTASSIUM 50 MG: 25 TABLET, FILM COATED ORAL at 08:46

## 2020-10-08 RX ADMIN — LEVOFLOXACIN 750 MG: 750 TABLET, FILM COATED ORAL at 08:46

## 2020-10-08 RX ADMIN — Medication 10 ML: at 19:54

## 2020-10-08 RX ADMIN — Medication 1 PACKET: at 08:48

## 2020-10-08 RX ADMIN — IPRATROPIUM BROMIDE AND ALBUTEROL SULFATE 1 AMPULE: .5; 3 SOLUTION RESPIRATORY (INHALATION) at 15:22

## 2020-10-08 RX ADMIN — IPRATROPIUM BROMIDE AND ALBUTEROL SULFATE 1 AMPULE: .5; 3 SOLUTION RESPIRATORY (INHALATION) at 11:03

## 2020-10-08 RX ADMIN — TAMSULOSIN HYDROCHLORIDE 0.4 MG: 0.4 CAPSULE ORAL at 19:53

## 2020-10-08 RX ADMIN — WARFARIN SODIUM 3 MG: 1 TABLET ORAL at 17:59

## 2020-10-08 ASSESSMENT — PAIN SCALES - GENERAL
PAINLEVEL_OUTOF10: 0

## 2020-10-08 NOTE — PROGRESS NOTES
PULMONARY AND CRITICAL CARE MEDICINE PROGRESS NOTE      SUBJECTIVE: Patient sitting in chair in no apparent respiratory distress. Reports that his breathing is slowly improving. Oxygen requirements have come down to 1 L/min while sitting. Has been using CPAP while sleeping. Minimal cough and expectoration. Remains afebrile without leukocytosis. REVIEW OF SYSTEMS:   8 point ROS is negative beside mentioned in subjective section. MEDICATIONS:      warfarin  3 mg Oral Daily    furosemide  40 mg Intravenous Once    ipratropium-albuterol  1 ampule Inhalation Q4H WA    psyllium  1 packet Oral Daily    levoFLOXacin  750 mg Oral Daily    allopurinol  300 mg Oral Daily    metFORMIN  500 mg Oral Daily    losartan  50 mg Oral Daily    hydroCHLOROthiazide  25 mg Oral Daily    atorvastatin  10 mg Oral Nightly    tamsulosin  0.4 mg Oral Nightly    aspirin  81 mg Oral Daily    multivitamin  1 tablet Oral Daily    metoprolol tartrate  50 mg Oral BID    fluticasone  1 spray Each Nostril Daily    sodium chloride flush  10 mL Intravenous 2 times per day    pantoprazole  40 mg Oral QAM AC      dextrose       glucose, dextrose, glucagon (rDNA), dextrose, aluminum & magnesium hydroxide-simethicone, sodium chloride flush, acetaminophen     ALLERGIES:   Allergies as of 10/05/2020 - Review Complete 10/05/2020   Allergen Reaction Noted    Amoxicillin Rash 04/17/2019        OBJECTIVE:   height is 5' 6\" (1.676 m) and weight is 231 lb 7.7 oz (105 kg). His temporal temperature is 98 °F (36.7 °C). His blood pressure is 124/54 (abnormal) and his pulse is 60. His respiration is 16 and oxygen saturation is 91%. I/O this shift:  In: -   Out: 625 [Urine:625]     PHYSICAL EXAM:    CONSTITUTIONAL: He is a 76y.o.-year-old who appears his stated age. He is alert and oriented x 3 and in no acute distress. CARDIOVASCULAR: S1 S2 RRR.  Without murmer  RESPIRATORY & CHEST: Lungs are clear to auscultation and percussion. No wheezing, no crackles. Good air movement  GASTROINTESTINAL & ABDOMEN: Soft, nontender, positive bowel sounds in all quadrants, non-distended, without hepatosplenomegaly. GENITOURINARY: Deferred. MUSCULOSKELETAL: No tenderness to palpation of the axial skeleton. There is no clubbing. No cyanosis. 2+ pitting edema in bilateral lower extremities. SKIN OF BODY: No rash or jaundice. PSYCHIATRIC EVALUATION: Normal affect. Patient answers questions appropriately. HEMATOLOGIC/LYMPHATIC/ IMMUNOLOGIC: No palpable lymphadenopathy. NEUROLOGIC: Alert and oriented x 3. Groslly non-focal. Motor strength is 5+/5 in all muscle groups. The patient has a normal sensorium globally. LABS:       Recent Labs     10/06/20  0405 10/06/20  0900 10/07/20  0205 10/08/20  0241   WBC  --  12.9*  --  9.6   HGB  --  12.5*  --  13.4*   HCT  --  38.1*  --  40.3*   PLT  --  202  --  191   NA  --  135*  --  138   K  --  4.8  --  4.3   CL  --  101  --  99   CREATININE  --  1.0  --  1.2   BUN  --  30*  --  28*   CO2  --  22  --  26   INR 2.60*  --  3.37* 2.65*       Recent Labs     10/06/20  0900 10/08/20  0241   GLUCOSE 218* 151*   CALCIUM 8.1* 9.4   * 138   K 4.8 4.3   CO2 22 26    99   BUN 30* 28*   CREATININE 1.0 1.2       No results for input(s): PHART, ZRB8EWW, PO2ART, CBT6RGK, K6AOATAL, BEART, P9PNJYJG in the last 72 hours.     Lab Results   Component Value Date    INR 2.65 (H) 10/08/2020    INR 3.37 (H) 10/07/2020    INR 2.60 (H) 10/06/2020    PROTIME 31.0 (H) 10/08/2020    PROTIME 39.6 (H) 10/07/2020    PROTIME 30.5 (H) 10/06/2020     No results found for: AMYLASE   Lab Results   Component Value Date    LABA1C 7.0 10/06/2020     Lab Results   Component Value Date    .2 10/06/2020     No results found for: TSH, O5FGSFS, P9VTHVE, THYROIDAB, FT3, T4FREE  No results found for: CKTOTAL, CKMB, CKMBINDEX, TROPONINI   No results found for: CRP   No results found for: BNP   No results found for: DDIMER No results found for: FERRITIN   No results found for: LACTA    IMAGING:     Chest x-ray portable 1 view done on 10/8/2020 was personally viewed by me and my interpretation is: Again demonstrated right upper lobe haziness. This has improved since the last chest imaging. No pleural effusions or pneumothorax seen. Cardiac silhouette is within normal limits. IMPRESSION:     1. Atrial fibrillation status post ablation. 2. Community-acquired pneumonia  3. Hypoxic respiratory failure  4. Obstructive sleep apnea on CPAP therapy  5. Obesity  6. Allergic rhinitis    RECOMMENDATION:   - All lab work and imaging was personally reviewed by me. 1. Patient's respiratory status is slowly improving. He has been started on levofloxacin p.o. for suspicion of community-acquired pneumonia. Day 3 of 5.  2. We continue with EZ Pap and DuoNeb nebulization to his therapy to help him expectorate. 3. Encourage patient to regularly do incentive spirometry every hour. 4. Patient has brought his CPAP machine from home. We will continue to use it every time he is sleeping/napping. 5. I will provide patient with 1 dosage of IV Lasix 40 mg to help him diurese. 6. Oxygen requirements have decreased. Hopefully he will be able to come off the oxygen soon. Please aim for a goal oxygen saturation of 89% or above. 7. Advised patient to use Flonase nasal spray at least once a day. Will continue to follow. Dennise Velasco MD  Pulmonary Critical Care and Sleep Medicine  10/8/2020, 3:38 PM    This note was completed using dragon medical speech recognition software. Grammatical errors, random word insertions, pronoun errors and incomplete sentences are occasional consequences of this technology due to software limitations. If there are questions or concerns about the content of this note of information contained within the body of this dictation they should be addressed with the provider for clarification.

## 2020-10-08 NOTE — PROGRESS NOTES
[address]   Electrophysiology Progress Note     Date: 10/8/2020  Admit Date: 10/5/2020       Chief Complaint: SOB    History of Present Illness: History obtained from patient and medical record. Natalee Vincent is a 76 y.o. male with a past medical history of atrial fibrillation, HTN, HLD, DM, DANO, and obesity. Hx of multiple DCCV    Interval Hx: Today, he is being seen for follow up. He feels well, however he remains on 3L of oxygen. He remains in sinus rhythm on telemetry. His blood pressure is stable, but on the low side. Will adjust his medications. His CXR showed no changes this AM. Awaiting pulmonary input. His right groin oozing has improved, remains soft with no hematoma/swelling. Patient seen and examined. Clinical notes reviewed. Telemetry reviewed. No new complaints today. No major events overnight. Denies having chest pain, palpitations, shortness of breath, orthopnea/PND, cough, or dizziness at the time of this visit. Allergies: Allergies   Allergen Reactions    Amoxicillin Rash     Home Meds:  Prior to Visit Medications    Medication Sig Taking?  Authorizing Provider   fluticasone (FLONASE) 50 MCG/ACT nasal spray 1 spray by Each Nostril route daily Yes Historical Provider, MD   warfarin (COUMADIN) 6 MG tablet Take 6 mg by mouth Monitored by PCP Yes Historical Provider, MD   metoprolol tartrate (LOPRESSOR) 50 MG tablet Take 50 mg by mouth 2 times daily Yes Historical Provider, MD   allopurinol (ZYLOPRIM) 300 MG tablet Take 1 tablet by mouth daily Yes Historical Provider, MD   metFORMIN (GLUCOPHAGE-XR) 500 MG extended release tablet Take 1 tablet by mouth daily Yes Historical Provider, MD   amLODIPine (NORVASC) 10 MG tablet Take 10 mg by mouth daily Yes Historical Provider, MD   losartan (COZAAR) 50 MG tablet Take 1 tablet by mouth daily Yes Historical Provider, MD   hydrochlorothiazide (HYDRODIURIL) 25 MG tablet Take 1 tablet by mouth daily Yes Historical Provider, MD lovastatin (MEVACOR) 40 MG tablet Take 40 mg by mouth daily Yes Historical Provider, MD   tamsulosin (FLOMAX) 0.4 MG capsule Take 0.4 mg by mouth daily Yes Historical Provider, MD   aspirin 81 MG chewable tablet Take 1 mg by mouth daily Yes Historical Provider, MD   Specialty Vitamins Products (PROSTATE) TABS Take 2 daily Yes Historical Provider, MD   Multiple Vitamin (ONE-A-DAY MENS) TABS Take 1 tablet by mouth daily Yes Historical Provider, MD   NONFORMULARY Nuretin: takes 2 daily Yes Historical Provider, MD   Multiple Vitamins-Minerals (ICAPS AREDS 2) CAPS Take by mouth 2 daily Yes Historical Provider, MD      Scheduled Meds:   warfarin  3 mg Oral Daily    ipratropium-albuterol  1 ampule Inhalation Q4H WA    psyllium  1 packet Oral Daily    levoFLOXacin  750 mg Oral Daily    allopurinol  300 mg Oral Daily    metFORMIN  500 mg Oral Daily    losartan  50 mg Oral Daily    hydroCHLOROthiazide  25 mg Oral Daily    atorvastatin  10 mg Oral Nightly    tamsulosin  0.4 mg Oral Nightly    aspirin  81 mg Oral Daily    multivitamin  1 tablet Oral Daily    metoprolol tartrate  50 mg Oral BID    fluticasone  1 spray Each Nostril Daily    sodium chloride flush  10 mL Intravenous 2 times per day    pantoprazole  40 mg Oral QAM AC     Continuous Infusions:   dextrose       PRN Meds:glucose, dextrose, glucagon (rDNA), dextrose, aluminum & magnesium hydroxide-simethicone, sodium chloride flush, acetaminophen     Past Medical History:  Past Medical History:   Diagnosis Date    [facility] disorder ([facility] [address]     Diabetes mellitus ([facility] [address]     Hyperlipidemia     Hypertension         Past Surgical History:    has no past surgical history on file. Social History:  Reviewed. reports that he has never smoked. He has never used smokeless tobacco. He reports current alcohol use of about 3.0 standard drinks of alcohol per week. He reports that he does not use drugs. Family History:  Reviewed.  family history includes Arrhythmia in his mother; Heart Attack in his maternal uncle; High Cholesterol in his father; Hypertension in his father. Review of Systems:  · Constitutional: Negative for fever, night sweats, chills, weight changes, or weakness  · Skin: Negative for rash, dry skin, pruritus, bruising, bleeding, blood clots, or changes in skin pigment  · HEENT: Negative for vision changes, ringing in the ears, sore throat, dysphagia, or swollen lymph nodes  · Respiratory: Reviewed in HPI  · Cardiovascular: Reviewed in HPI  · Gastrointestinal: Negative for abdominal pain, N/V/D, constipation, or black/tarry stools  · Genito-Urinary: Negative for dysuria, incontinence, urgency, or hematuria  · Musculoskeletal: Negative for joint swelling, muscle pain, or injuries  · Neurological/Psych: Negative for confusion, seizures, headaches, balance issues or TIA-like symptoms. No anxiety, depression, or insomnia    Physical Examination:  Vitals:    10/08/20 1127   BP: 114/82   Pulse: 66   Resp: 18   Temp: 98 °F (36.7 °C)   SpO2: 96%      In: -   Out: 950    Wt Readings from Last 3 Encounters:   10/08/20 231 lb 7.7 oz (105 kg)   07/15/20 237 lb (107.5 kg)   07/08/20 237 lb (107.5 kg)       Intake/Output Summary (Last 24 hours) at 10/8/2020 1135  Last data filed at 10/8/2020 0805  Gross per 24 hour   Intake 240 ml   Output 1750 ml   Net -1510 ml     Telemetry: Personally Reviewed  - Sinus rhythm  · Constitutional: Cooperative and in no apparent distress, and appears well nourished  · Skin: Warm and pink; no pallor, cyanosis, bruising, or clubbing. Right groin ablation site stable, no swelling or hematoma  · HEENT: Symmetric and normocephalic. PERRL, EOM intact. Conjunctiva pink with clear sclera. Mucus membranes pink and moist. Teeth intact. Thyroid smooth without nodules or goiter. · Cardiovascular: Regular rate and rhythm. S1/S2 present without murmurs, rubs, or gallops. Peripheral pulses 2+, capillary refill < 3 seconds.  No elevation of JVP. No peripheral edema  · Respiratory: Respirations symmetric and unlabored. Lungs diminished to auscultation bilaterally, no wheezing, crackles, or rhonchi. On 3L of oxygen  · Gastrointestinal: Abdomen soft and round. Bowel sounds normoactive in all quadrants without tenderness or masses. + Obese  · Musculoskeletal: Bilateral upper and lower extremity strength 5/5 with full ROM  · Neurologic/Psych: Awake and orientated to person, place and time. Calm affect, appropriate mood    Pertinent labs, diagnostic, device, and imaging results reviewed as a part of this visit    Labs:    BMP:   Recent Labs     10/06/20  0900 10/08/20  0241   * 138   K 4.8 4.3    99   CO2 22 26   BUN 30* 28*   CREATININE 1.0 1.2     Estimated Creatinine Clearance: 61 mL/min (based on SCr of 1.2 mg/dL). CBC:   Recent Labs     10/05/20  1505 10/06/20  0900 10/08/20  0241   WBC  --  12.9* 9.6   HGB 13.2* 12.5* 13.4*   HCT 39.9* 38.1* 40.3*   MCV  --  84.5 84.0   PLT  --  202 191     Thyroid: No results found for: TSH, Y8YBPMA, S0CMGUC, THYROIDAB  Lipids:   Lab Results   Component Value Date    CHOL 156 10/05/2020    HDL 54 10/05/2020    TRIG 139 10/05/2020     LFTS:   Lab Results   Component Value Date    ALT 55 10/05/2020    AST 34 10/05/2020    ALKPHOS 70 10/05/2020    PROT 7.5 10/05/2020    AGRATIO 1.6 10/05/2020    BILITOT 0.6 10/05/2020     Cardiac Enzymes: No results found for: CKTOTAL, CKMB, CKMBINDEX, TROPONINI  Coags:   Lab Results   Component Value Date    PROTIME 31.0 10/08/2020    INR 2.65 10/08/2020       ECG: 10/5/20  Sinus bradycardia, rate 53    LISSA: 10/5/20  Pending    ECHO: 3/19  EF 50-55%. Grade 2 DD  Mild MR    Stress Test: 4/19   Normal LV size with hyperdynamic systolic function.     Left ventricular ejection fraction of 73 %.     There is normal isotope uptake at stress and rest.     There is no evidence of myocardial ischemia or scar.       CXR: 10/6/20  No significant change in the right hilar and right upper lobe airspace    disease.  Follow-up chest x-ray to resolution is recommended.  If finding    persists, dedicated chest CT is recommended.           Problem List:   Patient Active Problem List    Diagnosis Date Noted    Status post placement of implantable loop recorder 10/05/2020    PAF (paroxysmal atrial fibrillation) ([facility] [address] 10/05/2020    Persistent atrial fibrillation ([facility] [address]     Encounter for electronic analysis of reveal event recorder 05/08/2019    [facility] 04/17/2019    Essential hypertension 04/17/2019    Other hyperlipidemia 04/17/2019    Type 2 diabetes mellitus without complication, without long-term current use of insulin ([facility] 04/17/2019    Shortness of breath 04/17/2019        Assessment and Plan:     1. Paroxysmal Atrial Fibrillation   - S/p RFCA with PVI (10/5/20, Dr. Ollie Burgos)   - Right groin stable, no hematoma/swelling/oozing   - Will start PPI daily x1 month s/p ablation   - Educated on post ablation discharge instructions/restrictions, pt VU      - Currently in NSR   - Continue metoprolol 50 mg BID   - [id number] score: 3 (Age, HTN, DM) ; [id number] Vasc score and anticoagulation discussed. High risk for stroke and thromboembolism. Anticoagulation is recommended. Risk of bleeding was discussed.    ~ On Coumadin; INR 2.6      - Afib risk factors including age, HTN, obesity, inactivity and DANO were discussed with patient. Risk factor modification recommended               ~ TSH 1.87 (10/20)                 - Treatment options including cardioversion, rate control strategy, antiarrhythmics, anticoagulation and possible ablation were discussed with patient. Risks, benefits and alternative of each treatment options were explained. All questions answered     2. Implantable Loop Recorder   - S/p ILR insertion on    - Will use to monitor AF burden   - Follow up with device clinic as scheduled     3.  HTN   - Controlled, on low side: Goal <130/80   - Stop amlodipine for now    4.DANO   - Stable: Uses CPAP   - Encourage to use CPAP to prevent long term effects of untreated DANO     5. PNA, hypoxia              - Noted on CXR              - Stable on 3L of oxygen   - On ABX              - Pulmonology following     6. Hyperlipidemia   - Controlled. Goal: LDL <100               ~ LDL 74 (10/20)   - On lovastatin 40 mg QD   - Discussed diet, weight loss, and exercise needs   - Followed per PCP    Will be ready for discharge once stable from pulmonary perspective. All pertinent information and plan of care discussed with the EP physician, Dr. Mclain Mons    All questions and concerns were addressed to the patient. Alternatives to my treatment were discussed. I have discussed the above stated plan with patient and the nurse. The patient verbalized understanding and agreed with the plan. The patient was seen for >35 minutes. I reviewed interval history, physical exam, review of data including labs, imaging, development and implementation of treatment plan and coordination of complex care    Thank you for allowing to us to participate in the care of Charly Mathews.     Jose Machado, DANNIE-CNP  [facility]   Office: [phone]

## 2020-10-08 NOTE — PLAN OF CARE
Problem: Falls - Risk of:  Goal: Will remain free from falls  Description: Will remain free from falls  10/8/2020 0828 by Kianna Rider RN  Outcome: Ongoing  10/7/2020 1903 by Eufemia Barnes RN  Outcome: Ongoing  Note: Pt free from falls this shift, bed side table next to bed, call light in reach, bed in lowest position, wheels locked. Encouraged to call for any assistance   Goal: Absence of physical injury  Description: Absence of physical injury  Outcome: Ongoing     Problem: Bleeding:  Goal: Will show no signs and symptoms of excessive bleeding  Description: Will show no signs and symptoms of excessive bleeding  10/8/2020 0828 by Kianna Rider RN  Outcome: Ongoing  10/7/2020 1903 by Eufemia Barnes RN  Outcome: Ongoing  Note: Skin and mucous membranes free of petechiae, purpura, ecchymoses, and active bleeding. Will continue to assess for any signs of bleeding, monitor lab values and avoid any unnecessary IV sticks. Pt vital signs stable.

## 2020-10-08 NOTE — PROGRESS NOTES
Physical Therapy    Facility/Department: [facility]  Initial Assessment/Discharge    NAME: Donald Howe  : 1945  MRN: [id number]    Date of Service: 10/8/2020    Discharge Recommendations:Alvin Marie scored a 24/ on the AM-PAC short mobility form. At this time, no further PT is recommended upon discharge due to achievement of PLOF. Pt demonstrates baseline level of function and no longer requires skilled therapy services at this time. Recommend patient returns to prior setting with prior services. PT Equipment Recommendations  Equipment Needed: No    Assessment   Assessment: Pt demonstrates baseline PLOF with ROM, strength, functional mobility, ADL status, balance and endurance. Pt able to ambulate ~500 ft in therapy without symptoms of fatigue, chest pain, or SOB. Pt no longer requiring therapy services due to achievement of PLOF  Treatment Diagnosis: Decreased Functional Mobility  Prognosis: Excellent  Decision Making: Low Complexity  Exam: Transfers, Ambulation  Clinical Presentation: Stable  PT Education: Goals;Gait Training;PT Role;Plan of Care;Precautions; Equipment; Functional Mobility Training  Patient Education: Pt verbalized understanding of PT education  Barriers to Learning: None  REQUIRES PT FOLLOW UP: No  Activity Tolerance  Activity Tolerance: Patient Tolerated treatment well  Activity Tolerance: Pt tolerated ambulation without any symptoms of chest pain, SOB or fatigue. After ~270 ft, pulse ox read 90% without symptoms. 93% after ~500ft without symptoms       Patient Diagnosis(es): There were no encounter diagnoses. has a past medical history of Clotting disorder ([facility] [address] Diabetes mellitus ([facility] [address] Hyperlipidemia, and Hypertension. has no past surgical history on file.     Restrictions  Restrictions/Precautions  Restrictions/Precautions: (Low Fall Risk)  Required Braces or Orthoses?: No  Vision/Hearing  Vision: Impaired  Vision Exceptions: Wears glasses at all times  Hearing: Independent  Distance: ~500 ft  Comments: No observable deviations in gait     Balance  Posture: Good  Sitting - Static: Good  Sitting - Dynamic: Good  Standing - Static: Good  Standing - Dynamic: Good        Plan   Plan  Times per week: Discharge  Safety Devices  Type of devices: All fall risk precautions in place, Left in chair, Call light within reach, Patient at risk for falls, Nurse notified  Restraints  Initially in place: No                          AM-PAC Score  AM-PAC Inpatient Mobility Raw Score : 24 (10/08/20 0905)  AM-PAC Inpatient T-Scale Score : 61.14 (10/08/20 0905)  Mobility Inpatient CMS 0-100% Score: 0 (10/08/20 0905)  Mobility Inpatient CMS G-Code Modifier : [facility] (10/08/20 0222)          Goals  Short term goals  Time Frame for Short term goals: Discharge  Short term goal 1: Pt met baseline PLOF, discharge from therapy at this time       Therapy Time   Individual Concurrent Group Co-treatment   Time In 0816         Time Out 0840         Minutes 24         Timed Code Treatment Minutes: 9 Minutes     THERESE Lubin  I agree with the above note. PT directly observed the SPT with the patient.   Joby Piña DPT [id number]  Sherry Tavarez, PT

## 2020-10-08 NOTE — PROGRESS NOTES
Pharmacy to Dose Warfarin    Pharmacy consulted to dose warfarin for Afib. INR Goal: 2-3    INR today: 2.65    Assessment/Plan:  - INR is therapeutic today  - Dose has been held for 2 days  - Will resume warfarin at reduced dose of 3 mg tonight    Pharmacy will continue to follow.     Gian Israel, PharmD, [place]  Clinical Pharmacist  [id number]

## 2020-10-08 NOTE — CARE COORDINATION
Patient's AM--PAC score of 24/24. Will not qualify for any home services. Unsure for home O2 consideration. Will need qualifying diagnosis to get home O2. Updated Dr. Natalie Waggoner re: possible home O2 needs. Currently at 1 liter NC at rest; up top  Will re-evaluate O2 needs tomorrow. Jeremiah Alicia updated. Daughter, Urmila Orozco, updated by . Case management will continue to follow progress and update discharge plan as needed.     Patricia Concepcion, OZIELN, .[phone]

## 2020-10-09 VITALS
HEART RATE: 72 BPM | TEMPERATURE: 98.1 F | SYSTOLIC BLOOD PRESSURE: 120 MMHG | OXYGEN SATURATION: 96 % | DIASTOLIC BLOOD PRESSURE: 64 MMHG | HEIGHT: 66 IN | BODY MASS INDEX: 36.99 KG/M2 | RESPIRATION RATE: 20 BRPM | WEIGHT: 230.16 LBS

## 2020-10-09 LAB
CULTURE, RESPIRATORY: NORMAL
GRAM STAIN RESULT: NORMAL
INR BLD: 1.91 (ref 0.86–1.14)
PROTHROMBIN TIME: 22.3 SEC (ref 10–13.2)

## 2020-10-09 PROCEDURE — 94669 MECHANICAL CHEST WALL OSCILL: CPT

## 2020-10-09 PROCEDURE — 85610 PROTHROMBIN TIME: CPT

## 2020-10-09 PROCEDURE — 6370000000 HC RX 637 (ALT 250 FOR IP): Performed by: INTERNAL MEDICINE

## 2020-10-09 PROCEDURE — 99233 SBSQ HOSP IP/OBS HIGH 50: CPT | Performed by: INTERNAL MEDICINE

## 2020-10-09 PROCEDURE — 94640 AIRWAY INHALATION TREATMENT: CPT

## 2020-10-09 PROCEDURE — 94761 N-INVAS EAR/PLS OXIMETRY MLT: CPT

## 2020-10-09 PROCEDURE — 99217 PR OBSERVATION CARE DISCHARGE MANAGEMENT: CPT | Performed by: NURSE PRACTITIONER

## 2020-10-09 RX ORDER — PANTOPRAZOLE SODIUM 40 MG/1
40 TABLET, DELAYED RELEASE ORAL
Qty: 30 TABLET | Refills: 0 | Status: SHIPPED | OUTPATIENT
Start: 2020-10-10 | End: 2020-11-18

## 2020-10-09 RX ADMIN — IPRATROPIUM BROMIDE AND ALBUTEROL SULFATE 1 AMPULE: .5; 3 SOLUTION RESPIRATORY (INHALATION) at 07:56

## 2020-10-09 RX ADMIN — FLUTICASONE PROPIONATE 1 SPRAY: 50 SPRAY, METERED NASAL at 10:11

## 2020-10-09 RX ADMIN — PANTOPRAZOLE SODIUM 40 MG: 40 TABLET, DELAYED RELEASE ORAL at 06:49

## 2020-10-09 RX ADMIN — METOPROLOL TARTRATE 50 MG: 50 TABLET, FILM COATED ORAL at 10:10

## 2020-10-09 RX ADMIN — METFORMIN HYDROCHLORIDE 500 MG: 500 TABLET, EXTENDED RELEASE ORAL at 10:11

## 2020-10-09 RX ADMIN — LOSARTAN POTASSIUM 50 MG: 25 TABLET, FILM COATED ORAL at 10:10

## 2020-10-09 RX ADMIN — ASPIRIN 81 MG: 81 TABLET, CHEWABLE ORAL at 10:11

## 2020-10-09 RX ADMIN — ALLOPURINOL 300 MG: 300 TABLET ORAL at 10:11

## 2020-10-09 RX ADMIN — THERA TABS 1 TABLET: TAB at 10:11

## 2020-10-09 RX ADMIN — LEVOFLOXACIN 750 MG: 750 TABLET, FILM COATED ORAL at 10:11

## 2020-10-09 RX ADMIN — HYDROCHLOROTHIAZIDE 25 MG: 25 TABLET ORAL at 10:11

## 2020-10-09 ASSESSMENT — PAIN SCALES - GENERAL
PAINLEVEL_OUTOF10: 0
PAINLEVEL_OUTOF10: 0

## 2020-10-09 NOTE — PROGRESS NOTES
Pharmacy to Dose Warfarin    Pharmacy consulted to dose warfarin for Afib. INR Goal: 2-3    INR today: 1.91    Assessment/Plan:  - INR subtherapeutic after being held for 2 days, dose was resumed last night  - Patient remains on levaquin daily, but will increase warfarin dose to 6 mg tonight to get patient within therapeutic range  - Daily INR ordered    Pharmacy will continue to follow.     Paty Chery, PharmD, [address]  Clinical Pharmacist  [id number]

## 2020-10-09 NOTE — PROGRESS NOTES
Daughter here to take pt home at 1530. Pt taken to car with all belongings via wheelchair per discharge lounge RN.

## 2020-10-09 NOTE — PROGRESS NOTES
PULMONARY AND CRITICAL CARE MEDICINE PROGRESS NOTE      SUBJECTIVE: Patient sitting in chair in no apparent respiratory distress. Currently on room air saturating 99%. Reports improvement in his breathing. No expectoration seen. Has been using CPAP while sleeping. Minimal cough and expectoration. Remains afebrile without leukocytosis. REVIEW OF SYSTEMS:   8 point ROS is negative beside mentioned in subjective section. MEDICATIONS:      warfarin  6 mg Oral Daily    ipratropium-albuterol  1 ampule Inhalation Q4H WA    psyllium  1 packet Oral Daily    levoFLOXacin  750 mg Oral Daily    allopurinol  300 mg Oral Daily    metFORMIN  500 mg Oral Daily    losartan  50 mg Oral Daily    hydroCHLOROthiazide  25 mg Oral Daily    atorvastatin  10 mg Oral Nightly    tamsulosin  0.4 mg Oral Nightly    aspirin  81 mg Oral Daily    multivitamin  1 tablet Oral Daily    metoprolol tartrate  50 mg Oral BID    fluticasone  1 spray Each Nostril Daily    sodium chloride flush  10 mL Intravenous 2 times per day    pantoprazole  40 mg Oral QAM AC      dextrose       glucose, dextrose, glucagon (rDNA), dextrose, aluminum & magnesium hydroxide-simethicone, sodium chloride flush, acetaminophen     ALLERGIES:   Allergies as of 10/05/2020 - Review Complete 10/05/2020   Allergen Reaction Noted    Amoxicillin Rash 04/17/2019        OBJECTIVE:   height is 5' 6\" (1.676 m) and weight is 230 lb 2.6 oz (104.4 kg). His temporal temperature is 98.1 °F (36.7 °C). His blood pressure is 120/64 and his pulse is 72. His respiration is 20 and oxygen saturation is 96%. No intake/output data recorded. PHYSICAL EXAM:    CONSTITUTIONAL: He is a 76y.o.-year-old who appears his stated age. He is alert and oriented x 3 and in no acute distress. CARDIOVASCULAR: S1 S2 RRR. Without murmer  RESPIRATORY & CHEST: Lungs are clear to auscultation and percussion. No wheezing, no crackles.  Good air movement  GASTROINTESTINAL & demonstrated right upper lobe haziness. This has improved since the last chest imaging. No pleural effusions or pneumothorax seen. Cardiac silhouette is within normal limits. IMPRESSION:     1. Atrial fibrillation status post ablation. 2. Community-acquired pneumonia  3. Hypoxic respiratory failure  4. Obstructive sleep apnea on CPAP therapy  5. Obesity  6. Allergic rhinitis    RECOMMENDATION:   - All lab work and imaging was personally reviewed by me. 1. Patient's respiratory status has improved. Will finish levofloxacin course by tomorrow. 2. Please provide patient with albuterol HFA to be used at home as needed. 3. Encourage patient to regularly do incentive spirometry every hour, even at home. 4. He will continue to use CPAP machine whenever he is sleeping or napping. 5. Patient diuresed well after dosage of IV Lasix yesterday. 6. Oxygen requirements have decreased. Hopefully he will be able to come off the oxygen soon. Please check ambulatory oxygen saturation prior to discharge. 7. From pulmonary standpoint patient can be discharged back home. He can follow-up with me within 10 days of hospital discharge. [facility]. Haile Hardin MD  Pulmonary Critical Care and Sleep Medicine  10/9/2020, 10:06 AM    This note was completed using dragon medical speech recognition software. Grammatical errors, random word insertions, pronoun errors and incomplete sentences are occasional consequences of this technology due to software limitations. If there are questions or concerns about the content of this note of information contained within the body of this dictation they should be addressed with the provider for clarification.

## 2020-10-09 NOTE — PROGRESS NOTES
Pt taken to discharge lounge via wheelchair with all belongings per discharge corby RN at [phone]. Pt waiting for ride to come around 1500 to pick him up.

## 2020-10-09 NOTE — PROGRESS NOTES
Pt walks about 900 feet in the hallway on 1LNC with stable SpO2 92%, absence of respiratory distress, tolerates ambulation fairly well. Return back to chair, VSS, NSR, denies any pain. Right groin puncture site redden and ecchymotic,soft, intact with destat in place. Expiratory wheezes upper lobes and crackles lower lobes. +1 pitting edema BLE.

## 2020-10-12 ENCOUNTER — TELEPHONE (OUTPATIENT)
Dept: CARDIOLOGY CLINIC | Age: 75
End: 2020-10-12

## 2020-10-12 NOTE — TELEPHONE ENCOUNTER
Pt called stated he bought electroinc scale he read on the package do not use with a pace maker he is wanting to know it this applies to his device?  pls call to advise thank you

## 2020-10-19 ENCOUNTER — NURSE ONLY (OUTPATIENT)
Dept: CARDIOLOGY CLINIC | Age: 75
End: 2020-10-19
Payer: MEDICARE

## 2020-10-19 PROCEDURE — 93298 REM INTERROG DEV EVAL SCRMS: CPT | Performed by: INTERNAL MEDICINE

## 2020-10-19 PROCEDURE — G2066 INTER DEVC REMOTE 30D: HCPCS | Performed by: INTERNAL MEDICINE

## 2020-10-19 NOTE — LETTER
[address] [facility] [phone]  [address] [address] [phone]    Pacemaker/Defibrillator Clinic          10/20/20        [address], [facility] [address]        Dear Natalee Vincent    This letter is to inform you that we received the transmission from your monitor at home that checks your implanted heart device. The next date your monitor will automatically transmit will be 12-21-20. If your report needs attention we will notify you. Your device and monitor are wireless and most transmit cellularly, but please periodically check your monitor is still plugged in to the electrical outlet. If you still use the telephone land line to send please ensure the connection to the phone joel is secure. This will help to ensure successful automatic transmissions in the future. Also, the monitor needs to be close to you while sleeping at night. Please be aware that the remote device transmission sites are periodically monitored only during regular business hours during which simultaneous in-office device clinics are being run. If your transmission requires attention, we will contact you as soon as possible. Thank you.             [address]

## 2020-10-20 NOTE — PROGRESS NOTES
We received a remote transmission form patient's monitor at home. Remote Linq report shows SVT and AF. Pt remains on coumadin. EP physician to review. We will continue to monitor remotely.

## 2020-10-21 ENCOUNTER — OFFICE VISIT (OUTPATIENT)
Dept: PULMONOLOGY | Age: 75
End: 2020-10-21
Payer: MEDICARE

## 2020-10-21 VITALS — OXYGEN SATURATION: 95 % | SYSTOLIC BLOOD PRESSURE: 118 MMHG | DIASTOLIC BLOOD PRESSURE: 60 MMHG | HEART RATE: 54 BPM

## 2020-10-21 PROCEDURE — G8484 FLU IMMUNIZE NO ADMIN: HCPCS | Performed by: INTERNAL MEDICINE

## 2020-10-21 PROCEDURE — 1036F TOBACCO NON-USER: CPT | Performed by: INTERNAL MEDICINE

## 2020-10-21 PROCEDURE — G8417 CALC BMI ABV UP PARAM F/U: HCPCS | Performed by: INTERNAL MEDICINE

## 2020-10-21 PROCEDURE — 3017F COLORECTAL CA SCREEN DOC REV: CPT | Performed by: INTERNAL MEDICINE

## 2020-10-21 PROCEDURE — G8427 DOCREV CUR MEDS BY ELIG CLIN: HCPCS | Performed by: INTERNAL MEDICINE

## 2020-10-21 PROCEDURE — 99214 OFFICE O/P EST MOD 30 MIN: CPT | Performed by: INTERNAL MEDICINE

## 2020-10-21 PROCEDURE — 94664 DEMO&/EVAL PT USE INHALER: CPT | Performed by: INTERNAL MEDICINE

## 2020-10-21 PROCEDURE — 1123F ACP DISCUSS/DSCN MKR DOCD: CPT | Performed by: INTERNAL MEDICINE

## 2020-10-21 PROCEDURE — 4040F PNEUMOC VAC/ADMIN/RCVD: CPT | Performed by: INTERNAL MEDICINE

## 2020-10-21 RX ORDER — ALBUTEROL SULFATE 90 UG/1
2 AEROSOL, METERED RESPIRATORY (INHALATION) 4 TIMES DAILY PRN
Qty: 1 INHALER | Refills: 5 | Status: SHIPPED | OUTPATIENT
Start: 2020-10-21 | End: 2020-11-18

## 2020-10-21 NOTE — PROGRESS NOTES
PULMONARY OFFICE FOLLOW UP NOTE    REASON FOR VISIT:   Chief Complaint   Patient presents with    Shortness of Breath     HFU       DATE OF VISIT: 10/21/2020     HISTORY OF PRESENT ILLNESS: 76y.o. year old male comes into the pulmonary office for follow-up. Patient was recently admitted to the hospital with atrial fibrillation and required elective cardiac ablation on October 5, 2020. Post ablation patient was seen to have respiratory distress and chest imaging showed right upper lobe infiltrate. Patient was treated as community-acquired pneumonia. Also seen to have hypoxic respiratory failure requiring oxygen supplementation for a few days. At the time of discharge patient did not require any oxygen supplementation. Today comes in for a follow-up reporting that his breathing is much improved. He does have occasional cough which gets exacerbated when he is talking for extended period. 's of time. His exercise tolerance is slowly improving. His heart rate fluctuates between high 40s to high 70s. Even when he is exercising strenuously it does not go above 70s. Patient is on metoprolol 50 mg twice a day. Is currently on hydrochlorothiazide. Does have mild pedal edema. Has been using his CPAP machine regularly. Denies any mask leakage or pressure intolerance. REVIEW OF SYSTEMS: 14 point ROS is negative beside mentioned in [address] PAST MEDICAL HISTORY:   Past Medical History:   Diagnosis Date    [facility] [address] [address]     Diabetes mellitus ([facility] [address] [facility]     Hyperlipidemia     Hypertension        PAST SURGICAL HISTORY: History reviewed. No pertinent surgical history. SOCIAL HISTORY:   Social History     Tobacco Use    Smoking status: Never Smoker    Smokeless tobacco: Never Used   Substance Use Topics    Alcohol use:  Yes     Alcohol/week: 3.0 standard drinks     Types: 3 Cans of beer per week    Drug use: Never       FAMILY HISTORY:   Family History   Problem Relation Age of Onset    Arrhythmia Mother     High Cholesterol Father     Hypertension Father     Heart Attack Maternal Uncle        MEDICATIONS:     Current Outpatient Medications on File Prior to Visit   Medication Sig Dispense Refill    pantoprazole (PROTONIX) 40 MG tablet Take 1 tablet by mouth every morning (before breakfast) 30 tablet 0    fluticasone (FLONASE) 50 MCG/ACT nasal spray 1 spray by Each Nostril route daily      warfarin (COUMADIN) 6 MG tablet Take 6 mg by mouth Monitored by PCP      metoprolol tartrate (LOPRESSOR) 50 MG tablet Take 50 mg by mouth 2 times daily      allopurinol (ZYLOPRIM) 300 MG tablet Take 1 tablet by mouth daily      metFORMIN (GLUCOPHAGE-XR) 500 MG extended release tablet Take 1 tablet by mouth daily      losartan (COZAAR) 50 MG tablet Take 1 tablet by mouth daily      hydrochlorothiazide (HYDRODIURIL) 25 MG tablet Take 1 tablet by mouth daily      lovastatin (MEVACOR) 40 MG tablet Take 40 mg by mouth daily      tamsulosin (FLOMAX) 0.4 MG capsule Take 0.4 mg by mouth daily      aspirin 81 MG chewable tablet Take 1 mg by mouth daily      Specialty Vitamins Products (PROSTATE) TABS Take 2 daily      Multiple Vitamin (ONE-A-DAY MENS) TABS Take 1 tablet by mouth daily      NONFORMULARY Nuretin: takes 2 daily      Multiple Vitamins-Minerals (ICAPS AREDS 2) CAPS Take by mouth 2 daily       No current facility-administered medications on file prior to visit. ALLERGIES:   Allergies as of 10/21/2020 - Review Complete 10/21/2020   Allergen Reaction Noted    Amoxicillin Rash 04/17/2019      OBJECTIVE:   blood pressure is 118/60 and his pulse is 54. His oxygen saturation is 95%. PHYSICAL EXAM:    CONSTITUTIONAL: He is a 76y.o.-year-old who appears his stated age. He is alert and oriented x 3 and in no acute distress. HEENT: PERRL. No scleral icterus. No thrush, atraumatic, normocephalic.   NECK: Supple, without cervical or supraclavicular lymphadenopathy:  CARDIOVASCULAR: S1 S2 RRR. Without murmer  RESPIRATORY & CHEST: Lungs are clear to auscultation and percussion. No wheezing, no crackles. Good air movement  GASTROINTESTINAL & ABDOMEN: Soft, nontender, positive bowel sounds in all quadrants, non-distended, without hepatosplenomegaly. GENITOURINARY: Deferred. MUSCULOSKELETAL: No tenderness to palpation of the axial skeleton. There is no clubbing. No cyanosis. No edema of the lower extremities. SKIN OF BODY: No rash or jaundice. PSYCHIATRIC EVALUATION: Normal affect. Patient answers questions appropriately. HEMATOLOGIC/LYMPHATIC/ IMMUNOLOGIC: No palpable lymphadenopathy. NEUROLOGIC: Alert and oriented x 3. Groslly non-focal. Motor strength is 5+/5 in all muscle groups. The patient has a normal sensorium globally. Labs:    Lab Summary Latest Ref Rng & Units 10/8/2020 10/6/2020 10/5/2020   WBC 4.0 - 11.0 K/uL 9.6 12. 9(H) -   Hgb 13.5 - 17.5 g/dL 13. 4(L) 12. 5(L) 13. 2(L)   Hct 40.5 - 52.5 % 40. 3(L) 38. 1(L) 39. 9(L)   Platelets 261 - 556 K/uL 191 202 -   Sodium 136 - 145 mmol/L 138 135(L) -   Potassium 3.5 - 5.1 mmol/L 4.3 4.8 -   BUN 7 - 20 mg/dL 28(H) 30(H) -   Creatinine 0.8 - 1.3 mg/dL 1.2 1.0 -   Glucose 70 - 99 mg/dL 151(H) 218(H) -   Calcium 8.3 - 10.6 mg/dL 9.4 8.1(L) -   Magnesium 1.80 - 2.40 mg/dL - - -   Alk Phos 40 - 129 U/L - - -   Albumin 3.4 - 5.0 g/dL - - -   Bilirubin 0.0 - 1.0 mg/dL - - -   AST 15 - 37 U/L - - -   ALT 10 - 40 U/L - - -   HDL cholesterol 40 - 60 mg/dL - - -   Triglycerides 0 - 150 mg/dL - - -   LDL calc <100 mg/dL - - -   VLDL calc Not Established mg/dL - - -   TSH 0.27 - 4.20 uIU/mL - - -   Some recent data might be hidden       All labs were personally reviewed by me any my interpretation is: CBC is mild anemia. Chem 8 is normal.     IMAGING:    No new chest imaging for me to evaluate. Pulmonary Functions Testing Results:    IMPRESSION AND RECOMMENDATIONS:     1.  Shortness of breath  -Patient has shortness of breath most likely due to a combination of factors including paroxysmal atrial fibrillation, morbid obesity and sleep apnea. Cannot completely rule out an obstructive airway disease. Patient has been a non-smoker and no occupational exposure to dust, smoke or fumes. We will get a complete PFT to evaluate this possibility and treat accordingly.    -In the meanwhile we will start him on albuterol HFA as needed. Patient was instructed in the use of a dose inhaler demonstration Device. Patient understood instructions and is aware to call the office if they have any problems or questions. Time spent was 5 mins. - Full PFT Study With Bronchodilator; Future    2. DANO on CPAP  -Patient remains compliant with his CPAP therapy and denies any mask leakage or pressure intolerance. I have advised the patient to get his ST card every time he follows up. 3. Paroxysmal atrial fibrillation ([address]  -As per cardiology services.  -Patient is on metoprolol 50 mg twice a day. His heart rate is well controlled but remains low even when he is exercising. I wonder if this is limiting his exercise capacity. I advised the patient to discuss the dosage of metoprolol with his cardiologist.  -He will benefit with continued diuretic usage. Currently on hydrochlorothiazide. If starts to have more pedal edema, may need to be switched over to furosemide. 4. Class 2 severe obesity due to excess calories with serious comorbidity and body mass index (BMI) of 37.0 to 37.9 in [facility])  -I strongly advised the patient to make efforts to lose weight. I discussed various modalities including moderate intensity intermittent exercises, diet control and bariatric surgery. If the patient loses even 10 to 15% of current body weight, it will be beneficial in improving the overall health. I spent 15 minutes in counseling the patient regarding medical nutrition therapy. Return in about 6 months (around 4/21/2021).        Chuy Valencia MD  Pulmonary Critical Care and Sleep Medicine  10/21/2020, 3:28 PM    This note was completed using dragon medical speech recognition software. Grammatical errors, random word insertions, pronoun errors and incomplete sentences are occasional consequences of this technology due to software limitations. If there are questions or concerns about the content of this note of information contained within the body of this dictation they should be addressed with the provider for clarification.

## 2020-11-18 ENCOUNTER — TELEPHONE (OUTPATIENT)
Dept: PULMONOLOGY | Age: 75
End: 2020-11-18

## 2020-11-18 ENCOUNTER — OFFICE VISIT (OUTPATIENT)
Dept: CARDIOLOGY CLINIC | Age: 75
End: 2020-11-18
Payer: MEDICARE

## 2020-11-18 ENCOUNTER — TELEPHONE (OUTPATIENT)
Dept: CARDIOLOGY CLINIC | Age: 75
End: 2020-11-18

## 2020-11-18 ENCOUNTER — NURSE ONLY (OUTPATIENT)
Dept: CARDIOLOGY CLINIC | Age: 75
End: 2020-11-18
Payer: MEDICARE

## 2020-11-18 VITALS
OXYGEN SATURATION: 96 % | SYSTOLIC BLOOD PRESSURE: 150 MMHG | BODY MASS INDEX: 36.77 KG/M2 | HEIGHT: 66 IN | DIASTOLIC BLOOD PRESSURE: 92 MMHG | HEART RATE: 55 BPM | WEIGHT: 228.8 LBS

## 2020-11-18 PROCEDURE — G8427 DOCREV CUR MEDS BY ELIG CLIN: HCPCS | Performed by: NURSE PRACTITIONER

## 2020-11-18 PROCEDURE — 1036F TOBACCO NON-USER: CPT | Performed by: NURSE PRACTITIONER

## 2020-11-18 PROCEDURE — 4040F PNEUMOC VAC/ADMIN/RCVD: CPT | Performed by: NURSE PRACTITIONER

## 2020-11-18 PROCEDURE — G8417 CALC BMI ABV UP PARAM F/U: HCPCS | Performed by: NURSE PRACTITIONER

## 2020-11-18 PROCEDURE — 93285 PRGRMG DEV EVAL SCRMS IP: CPT | Performed by: INTERNAL MEDICINE

## 2020-11-18 PROCEDURE — G8484 FLU IMMUNIZE NO ADMIN: HCPCS | Performed by: NURSE PRACTITIONER

## 2020-11-18 PROCEDURE — 3017F COLORECTAL CA SCREEN DOC REV: CPT | Performed by: NURSE PRACTITIONER

## 2020-11-18 PROCEDURE — 1123F ACP DISCUSS/DSCN MKR DOCD: CPT | Performed by: NURSE PRACTITIONER

## 2020-11-18 PROCEDURE — 93000 ELECTROCARDIOGRAM COMPLETE: CPT | Performed by: NURSE PRACTITIONER

## 2020-11-18 PROCEDURE — 99214 OFFICE O/P EST MOD 30 MIN: CPT | Performed by: NURSE PRACTITIONER

## 2020-11-18 RX ORDER — AMLODIPINE BESYLATE 10 MG/1
10 TABLET ORAL DAILY
Qty: 90 TABLET | Refills: 1 | Status: SHIPPED | OUTPATIENT
Start: 2020-11-18 | Stop reason: SDUPTHER

## 2020-11-18 RX ORDER — METOPROLOL TARTRATE 50 MG/1
25 TABLET, FILM COATED ORAL 2 TIMES DAILY
Qty: 60 TABLET | Refills: 0
Start: 2020-11-18 | End: 2021-01-08 | Stop reason: SDUPTHER

## 2020-11-18 NOTE — PROGRESS NOTES
and denies SOB. He had some rectal bleeding after the ablation and discussed with PCP who advised him to stop blood thinner for 2-3 days. Also noted to have poor heart rate variability in pulmonologist office, however he had stress test 4/2019 that showed HR 88% predicted. He is wearing his CPAP nightly, however his events per hour have increased from 3 events to 19 events. He previous followed [facility]. Denies having chest pain, palpitations, shortness of breath, orthopnea/PND, cough, or dizziness at the time of this visit. With regard to medication therapy the patient has been compliant with prescribed regimen. He has tolerated therapy to date. Allergies: Allergies   Allergen Reactions    Amoxicillin Rash     Home Medications:  Prior to Visit Medications    Medication Sig Taking?  Authorizing Provider   albuterol sulfate HFA (VENTOLIN HFA) 108 (90 Base) MCG/ACT inhaler Inhale 2 puffs into the lungs 4 times daily as needed for Wheezing  Criselda Daly MD   pantoprazole (PROTONIX) 40 MG tablet Take 1 tablet by mouth every morning (before breakfast)  DANNIE Arambula - CNP   fluticasone (FLONASE) 50 MCG/ACT nasal spray 1 spray by Each Nostril route daily  Historical Provider, MD   warfarin (COUMADIN) 6 MG tablet Take 6 mg by mouth Monitored by PCP  Historical Provider, MD   metoprolol tartrate (LOPRESSOR) 50 MG tablet Take 50 mg by mouth 2 times daily  Historical Provider, MD   allopurinol (ZYLOPRIM) 300 MG tablet Take 1 tablet by mouth daily  Historical Provider, MD   metFORMIN (GLUCOPHAGE-XR) 500 MG extended release tablet Take 1 tablet by mouth daily  Historical Provider, MD   losartan (COZAAR) 50 MG tablet Take 1 tablet by mouth daily  Historical Provider, MD   hydrochlorothiazide (HYDRODIURIL) 25 MG tablet Take 1 tablet by mouth daily  Historical Provider, MD   lovastatin (MEVACOR) 40 MG tablet Take 40 mg by mouth daily  Historical Provider, MD   tamsulosin (FLOMAX) 0.4 MG capsule Take 0.4 mg by mouth daily  Historical Provider, MD   aspirin 81 MG chewable tablet Take 1 mg by mouth daily  Historical Provider, MD   Specialty Vitamins Products (PROSTATE) TABS Take 2 daily  Historical Provider, MD   Multiple Vitamin (ONE-A-DAY MENS) TABS Take 1 tablet by mouth daily  Historical Provider, MD   NONFORMULARY Nuretin: takes 2 daily  Historical Provider, MD   Multiple Vitamins-Minerals (ICAPS AREDS 2) CAPS Take by mouth 2 daily  Historical Provider, MD      Past Medical History:  Past Medical History:   Diagnosis Date    Clotting disorder ([facility] [address]     Diabetes mellitus ([facility] [address]     Hyperlipidemia     Hypertension      Past Surgical History:    has no past surgical history on file. Social History:  Reviewed. reports that he has never smoked. He has never used smokeless tobacco. He reports current alcohol use of about 3.0 standard drinks of alcohol per week. He reports that he does not use drugs. Family History:  Reviewed. family history includes Arrhythmia in his mother; Heart Attack in his maternal uncle; High Cholesterol in his father; Hypertension in his father. Denies family history of sudden cardiac death, arrhythmia, premature CAD    Review of System:  · Constitutional: No fevers, chills, weight changes or weakness   · HEENT: No visual changes. No mouth sores or sore throat. · Cardiovascular: denies chest pain, denies dyspnea on exertion, denies palpitations or denies loss of consciousness. No cough, hemoptysis, denies pleuritic pain, or phlebitis. admits to dizziness. · Respiratory: denies cough or wheezing. No hematemesis. · Gastrointestinal: No abdominal pain, blood in stools. · Genitourinary: No dysuria, urgency or hematuria. · Musculoskeletal: denies gait disturbance, No focal muscle weakness. · Integumentary: No rash or pruritis. · Neurological: No headache, No focal weakness  · Psychiatric: No confusion, anxiety, or depression.   · Endocrine: No temperature intolerance. · Hem/Lymph: Denies abnormal bruising. Admits to rectal bleeding x 1 resolved with holding warfarin for 2 days    Physical Examination:  There were no vitals filed for this visit. Wt Readings from Last 3 Encounters:   10/09/20 230 lb 2.6 oz (104.4 kg)   07/15/20 237 lb (107.5 kg)   07/08/20 237 lb (107.5 kg)      Constitutional: Cooperative and in no apparent distress, and appears well nourished   Skin: Warm and pink; no pallor, cyanosis, bruising, or clubbing   HEENT: Symmetric and normocephalic. Conjunctiva pink with clear sclera. Mucus membranes pink and moist. No visible masses/goiter   Respiratory: Respirations symmetric and unlabored. Lungs clear to auscultation bilaterally, no wheezing, rhonchi, or crackles.  Cardiovascular:  regular rate and rhythm. S1 & S2 present without murmurs, rubs, or gallops. Peripheral pulses 2+, capillary refill < 3 seconds. negative elevation of JVP. No peripheral edema   Gastrointestinal: Abdomen soft and round.  Musculoskeletal:  No focal weakness.  Neurological/Psych: Awake and orientated to person, place and time. Calm affect, appropriate mood. Pertinent labs, diagnostic, device, and imaging results reviewed as a part of this visit    LABS    CBC:   Lab Results   Component Value Date    WBC 9.6 10/08/2020    HGB 13.4 (L) 10/08/2020    HCT 40.3 (L) 10/08/2020    MCV 84.0 10/08/2020     10/08/2020     BMP:   Lab Results   Component Value Date    CREATININE 1.2 10/08/2020    BUN 28 (H) 10/08/2020     10/08/2020    K 4.3 10/08/2020    CL 99 10/08/2020    CO2 26 10/08/2020     CrCl cannot be calculated (Unknown ideal weight.).    No results found for: BNP    Thyroid: No results found for: TSH, I7QVIYG, T2ZWGMC, THYROIDAB  Lipid Panel:   Lab Results   Component Value Date    CHOL 156 10/05/2020    HDL 54 10/05/2020    TRIG 139 10/05/2020     LFTs:  Lab Results   Component Value Date    ALT 55 (H) 10/05/2020    AST 34 10/05/2020    ALKPHOS 70 10/05/2020    BILITOT 0.6 10/05/2020     Coags:   Lab Results   Component Value Date    PROTIME 22.3 (H) 10/09/2020    INR 1.91 (H) 10/09/2020     EC2020 SB HR 50, , , QTc 435    Echo: 10/5/2020  Summary   Overall left ventricular function is normal.   Mitral valve is structurally normal.   Trivial mitral regurgitation is present. There is no evidence of mass or thrombus in the left atrium or appendage. Tricuspid aortic valve. No evidence of aortic valve regurgitation. 3/1/19:   EF 50-55%. The left ventricular function is low normal.  There is mild global hypokinesis of the left ventricle. The diastolic function is impaired and classified as Grade 2  (psuedonormalization). The left atrium is moderately dilated. There is mild mitral regurgitation. Right ventricular systolic pressure is indeterminate due to poorly  visualized tricuspid regurgitation  GXT: 2019  Summary     Normal LV size with hyperdynamic systolic function.     Left ventricular ejection fraction of 73 %.     There is normal isotope uptake at stress and rest.     There is no evidence of myocardial ischemia or scar.           Assessment:  Paroxysmal Atrial Fibrillation  - ECG today shows SB 50  - On Lopressor 50 mg daily  - [id number] Vasc score and anticoagulation discussed. High risk for stroke and thromboembolism. Anticoagulation is recommended.   ~ On warfarin, one episode of rectal bleeding, now resolved without recurrence  - Afib risk factors including age, HTN, obesity, inactivity and DANO were discussed with patient. Risk factor modification recommended   ~ TSH 1.87 (10/5/2020)     - Treatment options including cardioversion, rate control strategy, antiarrhythmics, anticoagulation and possible ablation were discussed with patient. Risks, benefits and alternative of each treatment options were explained.  All questions answered    ~ S/p DCCV 2020    ~ S/p RFCA of afib w/ PVI,CFAE  Implantable Loop Recorder  - S/p ILR insertion on 4/2019  -The CIED was interrogated and programmed and I supervised and reviewed all the data. All findings and changes are in device interrogation sheat and reflect my personal interpretation and changes and is scanned to Epic  - Device shows: No bradycardia, he has had 1 episode of AF since ablation that lasted 4 hours  - Follow up with device clinic as scheduled  - Increased karyn detection from 30-40 bpm to monitor bradycardia closely  HTN-goal <130/80   - Uncontrolled   - On HCTZ, Lopressor, and losartan   - Encouraged patient to check BP at home, log and bring to office visits  - Discussed lifestyle modifications, weight loss, low sodium diet  Plan  - Resume amlodipine 10 mg daily   - Decrease metoprolol to 25 mg bid for a trial to see if symptoms improve  - Refer to sleep medicine    F/U: Follow-up with EP in 3 months MXA  -Follow up with device clinic as scheduled  -Call [facility] [place] at [phone] with any questions    Diet & Exercise:   The patient is counseled to follow a low salt diet to assure blood pressure remains controlled for cardiovascular risk factor modification   The patient is counseled to avoid excess caffeine, and energy drinks as this may exacerbated ectopy and arrhythmia   The patient is counseled to lose weight to control cardiovascular risk factors   Exercise program discussed: To improve overall cardiovascular health, the patient is instructed to increase cardiovascular related activities with a goal of 150 min/week of moderate level activity or 10,000 steps per day. Encouraged to perform as much activity as tolerated     I have addressed the patient's cardiac risk factors and adjusted pharmacologic treatment as needed. In addition, I have reinforced the need for patient directed risk factor modification. I independently reviewed the device check interrogation and ECG    All questions and concerns were addressed with the patient.  Alternatives to treatment were discussed. Thank you for allowing to us to participate in the care of Gamal Rivera.     Hilario Cline, DANNIE-CNP  [address]   Office: [phone]

## 2020-11-18 NOTE — TELEPHONE ENCOUNTER
Spoke with patient, reviewed ILR interrogation. Two episodes of a fib 10/13 and 11/13. C/o more frequent \" episodes\" on CPAP. Denies CP, Lightheaded, Dizziness. C/o jitteriness, nausea. Feeling irregular heart beats. /70 per paient. HR 40s-50s. Appointment moved to 1300 11/18/20 .

## 2020-12-21 ENCOUNTER — NURSE ONLY (OUTPATIENT)
Dept: CARDIOLOGY CLINIC | Age: 75
End: 2020-12-21
Payer: MEDICARE

## 2020-12-21 PROCEDURE — 93298 REM INTERROG DEV EVAL SCRMS: CPT | Performed by: INTERNAL MEDICINE

## 2020-12-21 PROCEDURE — G2066 INTER DEVC REMOTE 30D: HCPCS | Performed by: INTERNAL MEDICINE

## 2020-12-21 NOTE — PROGRESS NOTES
We received a remote transmission form patient's monitor at home. Remote Linq report shows AF and karyn recordings. Pt remains on coumadin. EP physician to review. We will continue to monitor remotely.

## 2020-12-21 NOTE — LETTER
[address] [phone]  [address]  [address] [address] [phone]    Pacemaker/Defibrillator Clinic          12/21/20        [address], [facility] [address]        Dear Keshia Scott    This letter is to inform you that we received the transmission from your monitor at home that checks your implanted heart device. The next date your monitor will automatically transmit will be 1-25-21. If your report needs attention we will notify you. Your device and monitor are wireless and most transmit cellularly, but please periodically check your monitor is still plugged in to the electrical outlet. If you still use the telephone land line to send please ensure the connection to the phone joel is secure. This will help to ensure successful automatic transmissions in the future. Also, the monitor needs to be close to you while sleeping at night. Please be aware that the remote device transmission sites are periodically monitored only during regular business hours during which simultaneous in-office device clinics are being run. If your transmission requires attention, we will contact you as soon as possible. Thank you.             [facility]

## 2021-01-08 RX ORDER — METOPROLOL TARTRATE 50 MG/1
25 TABLET, FILM COATED ORAL 2 TIMES DAILY
Qty: 180 TABLET | Refills: 1 | Status: SHIPPED | OUTPATIENT
Start: 2021-01-08 | End: 2022-04-11

## 2021-01-08 NOTE — TELEPHONE ENCOUNTER
I spoke with Rayne Pfeiffer. He states his heart rate is staying  50-60 and his energy level is better.  Just went on a two mile hike and tolerated well with HR in the mid 60s

## 2021-01-08 NOTE — TELEPHONE ENCOUNTER
Received refill request for Metorprolol from [address] Last OV: 11/18/2020 w/ NPAL    Last Refill: 11/18/2020 #60 w/ 0 refills     Next Appointment: 02/18/2021 w/ KAUR    PER LOV :   Decrease metoprolol to 25 mg bid for a trial to see if symptoms improve    Made an attempt to contact the pt to see if symptoms improved.  Phone was busy

## 2021-01-25 ENCOUNTER — NURSE ONLY (OUTPATIENT)
Dept: CARDIOLOGY CLINIC | Age: 76
End: 2021-01-25
Payer: MEDICARE

## 2021-01-25 DIAGNOSIS — I48.0 PAF (PAROXYSMAL ATRIAL FIBRILLATION) (HCC): ICD-10-CM

## 2021-01-25 DIAGNOSIS — Z45.09 ENCOUNTER FOR ELECTRONIC ANALYSIS OF REVEAL EVENT RECORDER: ICD-10-CM

## 2021-01-25 PROCEDURE — 93298 REM INTERROG DEV EVAL SCRMS: CPT | Performed by: INTERNAL MEDICINE

## 2021-01-25 PROCEDURE — G2066 INTER DEVC REMOTE 30D: HCPCS | Performed by: INTERNAL MEDICINE

## 2021-02-16 NOTE — PROGRESS NOTES
All other systems reviewed and are negative except for that noted above. Pertinent negatives are:   General: negative for fever, chills   Ophthalmic ROS: negative for - eye pain or loss of vision  ENT ROS: negative for - headaches, sore throat   Respiratory: negative for - cough, sputum  Cardiovascular: Reviewed in HPI  Gastrointestinal: negative for - abdominal pain, diarrhea, N/V  Hematology: negative for - bleeding, blood clots, bruising or jaundice  Genito-Urinary:  negative for - Dysuria or incontinence  Musculoskeletal: negative for - Joint swelling, muscle pain  Neurological: negative for - confusion, dizziness, headaches   Psychiatric: No anxiety, no depression. Dermatological: negative for - rash    Physical Examination:  Vitals:    21 1004   BP: (!) 151/74   Pulse: 69   SpO2: 97%      Wt Readings from Last 3 Encounters:   21 230 lb (104.3 kg)   20 228 lb 12.8 oz (103.8 kg)   10/09/20 230 lb 2.6 oz (104.4 kg)       · Constitutional: Oriented. No distress. obese  · Head: Normocephalic and atraumatic. · Mouth/Throat: Oropharynx is clear and moist.   · Eyes: Conjunctivae normal. EOM are normal.   · Neck: Neck supple. No rigidity. No JVD present. · Cardiovascular: Normal rate, regular rhythm, S1&S2. · Pulmonary/Chest: Bilateral respiratory sounds. No wheezes, No rhonchi. · Abdominal: Soft. Bowel sounds present. No distension, No tenderness. · Musculoskeletal: No tenderness. No edema    · Lymphadenopathy: Has no cervical adenopathy. · Neurological: Alert and oriented. Cranial nerve appears intact, No Gross deficit   · Skin: Skin is warm and dry. No rash noted. · Psychiatric: Has a normal behavior       Labs, diagnostic and imaging results reviewed. Reviewed.    Lab Results   Component Value Date    TSHREFLEX 1.87 10/05/2020    CREATININE 1.2 10/08/2020    CREATININE 1.0 10/06/2020    AST 34 10/05/2020    ALT 55 10/05/2020       EC2020 Sinus rhythm Echo 10/05/2020   Summary   Overall left ventricular function is normal.   Mitral valve is structurally normal.   Trivial mitral regurgitation is present. There is no evidence of mass or thrombus in the left atrium or appendage. Tricuspid aortic valve. No evidence of aortic valve regurgitation. Nuclear Stress 4/24/19  Summary    Normal LV size with hyperdynamic systolic function.    Left ventricular ejection fraction of 73 %.    There is normal isotope uptake at stress and rest.    There is no evidence of myocardial ischemia or scar. Echo: 3/1/19:   EF 50-55%. The left ventricular function is low normal.  There is mild global hypokinesis of the left ventricle. The diastolic function is impaired and classified as Grade 2  (psuedonormalization). The left atrium is moderately dilated. There is mild mitral regurgitation. Right ventricular systolic pressure is indeterminate due to poorly  visualized tricuspid regurgitation      Medication:  Current Outpatient Medications   Medication Sig Dispense Refill    metoprolol tartrate (LOPRESSOR) 50 MG tablet Take 0.5 tablets by mouth 2 times daily 180 tablet 1    amLODIPine (NORVASC) 10 MG tablet Take 1 tablet by mouth daily 90 tablet 1    fluticasone (FLONASE) 50 MCG/ACT nasal spray 1 spray by Each Nostril route daily      warfarin (COUMADIN) 6 MG tablet Take 5 mg by mouth Taking 5 MG.  Monitored by PCP      allopurinol (ZYLOPRIM) 300 MG tablet Take 1 tablet by mouth daily      metFORMIN (GLUCOPHAGE-XR) 500 MG extended release tablet Take 1 tablet by mouth daily      losartan (COZAAR) 50 MG tablet Take 1 tablet by mouth daily      hydrochlorothiazide (HYDRODIURIL) 25 MG tablet Take 1 tablet by mouth daily      lovastatin (MEVACOR) 40 MG tablet Take 40 mg by mouth daily      tamsulosin (FLOMAX) 0.4 MG capsule Take 0.4 mg by mouth daily      aspirin 81 MG chewable tablet Take 1 mg by mouth daily  Specialty Vitamins Products (PROSTATE) TABS Take 2 daily      Multiple Vitamin (ONE-A-DAY MENS) TABS Take 1 tablet by mouth daily      NONFORMULARY Nuretin: takes 2 daily      Multiple Vitamins-Minerals (ICAPS AREDS 2) CAPS Take by mouth 2 daily       No current facility-administered medications for this visit. Assessment and plan:     Paroxysmal atrial fibrillation (HCC)        EKG 02/18/2020 sinus rhythm  S/p RFA For atrial fibrillation with PVI with ablation of complex atrial fractionated electrogram and focal source of atrial fibrillation. 10/05/2020  On Metoprolol 25 mg BID  On Warfarin    [id number] Vasc score and anticoagulation discussed. High risk for stroke and thromboembolism. Anticoagulation is recommended. I discussed anticoagulation to decrease the risk of thromboembolic events including stroke. Benefits and alternatives were discussed with patient. Risk of bleeding was discussed. Patient verbalized understanding. On Coumadin d/t cost   He has had no recurrence of atrial fibrillation since ablation. He is feeling fine. Continue to lose weight. S/p ILR 04/29/2019  The CIED was interrogated and programmed and I supervised and reviewed all the data. All findings and changes are in device interrogation sheat and reflect my personal interpretation and changes and is scanned to Epic. AFIB burden  0%. No A. fib since ablation    HTN  Vitals:    02/18/21 1004   BP: (!) 151/74   Pulse: 69   SpO2: 97%   - states runs 140s /60-70. It was a rough drive in today.  -Home BP monitoring encourage with a BP goal <130/80  Norvasc 10 mg daily  Losartan 50 mg daily    Other hyperlipidemia   follows with pcp. On Mevacor    Sleep apnea                using cpap nightly      Obesity  Body mass index is 36.02 kg/m². - Excessive weight is complicating assessment and treatment. It is placing patient at risk for multiple co-morbidities as well as early death and contributing to the patient's presentation. - discussed weight management with diet and exercise    - he is seeing a dietian       Plan:    No changes  6 months with EPNP   Continue coumadin  Try to continue losing weight. Thank you for allowing me to participate in the care of Tameka Rod. Further evaluation will be based upon the patient's clinical course and testing results. All questions and concerns were addressed to the patient/family. Alternatives to my treatment were discussed. I have discussed the above stated plan and the patient verbalized understanding and agreed with the plan. NOTE: This report was transcribed using voice recognition software. Every effort was made to ensure accuracy, however, inadvertent computerized transcription errors may be present. Lorri Mar MD, [place] [address]   Office: [phone]  Scribe attestation:  This note was scribed in the presence of Lorri Mar MD by Fidelia Lynch RN    I, Dr. Lorri Mar personally performed the services described in this documentation as scribed by RN in my presence, and it is both accurate and complete.

## 2021-02-18 ENCOUNTER — NURSE ONLY (OUTPATIENT)
Dept: CARDIOLOGY CLINIC | Age: 76
End: 2021-02-18
Payer: MEDICARE

## 2021-02-18 ENCOUNTER — OFFICE VISIT (OUTPATIENT)
Dept: CARDIOLOGY CLINIC | Age: 76
End: 2021-02-18
Payer: MEDICARE

## 2021-02-18 VITALS
HEART RATE: 69 BPM | OXYGEN SATURATION: 97 % | BODY MASS INDEX: 36.1 KG/M2 | DIASTOLIC BLOOD PRESSURE: 74 MMHG | WEIGHT: 230 LBS | HEIGHT: 67 IN | SYSTOLIC BLOOD PRESSURE: 151 MMHG

## 2021-02-18 DIAGNOSIS — I48.0 PAF (PAROXYSMAL ATRIAL FIBRILLATION) (HCC): ICD-10-CM

## 2021-02-18 DIAGNOSIS — I10 ESSENTIAL HYPERTENSION: ICD-10-CM

## 2021-02-18 DIAGNOSIS — E66.9 OBESITY (BMI 30-39.9): ICD-10-CM

## 2021-02-18 DIAGNOSIS — I48.0 PAROXYSMAL ATRIAL FIBRILLATION (HCC): Primary | ICD-10-CM

## 2021-02-18 DIAGNOSIS — Z45.09 ENCOUNTER FOR ELECTRONIC ANALYSIS OF REVEAL EVENT RECORDER: ICD-10-CM

## 2021-02-18 DIAGNOSIS — G47.33 OSA (OBSTRUCTIVE SLEEP APNEA): ICD-10-CM

## 2021-02-18 PROCEDURE — G8427 DOCREV CUR MEDS BY ELIG CLIN: HCPCS | Performed by: INTERNAL MEDICINE

## 2021-02-18 PROCEDURE — 99214 OFFICE O/P EST MOD 30 MIN: CPT | Performed by: INTERNAL MEDICINE

## 2021-02-18 PROCEDURE — G8484 FLU IMMUNIZE NO ADMIN: HCPCS | Performed by: INTERNAL MEDICINE

## 2021-02-18 PROCEDURE — 4040F PNEUMOC VAC/ADMIN/RCVD: CPT | Performed by: INTERNAL MEDICINE

## 2021-02-18 PROCEDURE — 93291 INTERROG DEV EVAL SCRMS IP: CPT | Performed by: INTERNAL MEDICINE

## 2021-02-18 PROCEDURE — 1036F TOBACCO NON-USER: CPT | Performed by: INTERNAL MEDICINE

## 2021-02-18 PROCEDURE — G8417 CALC BMI ABV UP PARAM F/U: HCPCS | Performed by: INTERNAL MEDICINE

## 2021-02-18 PROCEDURE — 3017F COLORECTAL CA SCREEN DOC REV: CPT | Performed by: INTERNAL MEDICINE

## 2021-02-18 PROCEDURE — 1123F ACP DISCUSS/DSCN MKR DOCD: CPT | Performed by: INTERNAL MEDICINE

## 2021-02-18 NOTE — PROGRESS NOTES
Patient comes in for interrogation of their implanted loop recorder. Interrogation shows 22 Chet episodes during sleep time hours. Last on 11/21/2020, longest 1 minutes with a Min V rate of 35 bpm. Implanted for AF management. Patient remains on warfarin and metoprolol. Please see interrogation for more detail. Patient will see Dr. Naida Mackay today and we will continue to follow the Patient remotely.

## 2021-03-20 PROCEDURE — 93298 REM INTERROG DEV EVAL SCRMS: CPT | Performed by: INTERNAL MEDICINE

## 2021-03-20 PROCEDURE — G2066 INTER DEVC REMOTE 30D: HCPCS | Performed by: INTERNAL MEDICINE

## 2021-03-22 ENCOUNTER — NURSE ONLY (OUTPATIENT)
Dept: CARDIOLOGY CLINIC | Age: 76
End: 2021-03-22
Payer: MEDICARE

## 2021-03-22 DIAGNOSIS — Z45.09 ENCOUNTER FOR ELECTRONIC ANALYSIS OF REVEAL EVENT RECORDER: ICD-10-CM

## 2021-03-22 DIAGNOSIS — I48.0 PAF (PAROXYSMAL ATRIAL FIBRILLATION) (HCC): ICD-10-CM

## 2021-03-22 NOTE — LETTER
[address] [facility] [phone]  [address]  [address] [address] [phone]    Pacemaker/Defibrillator Clinic          03/23/21        [address] [facility] [facility] [address]        Dear Nisha Pearce    This letter is to inform you that we received the transmission from your monitor at home that checks your implanted heart device. The next date your monitor will automatically transmit will be 4-26-21. If your report needs attention we will notify you. Your device and monitor are wireless and most transmit cellularly, but please periodically check your monitor is still plugged in to the electrical outlet. If you still use the telephone land line to send please ensure the connection to the phone joel is secure. This will help to ensure successful automatic transmissions in the future. Also, the monitor needs to be close to you while sleeping at night. Please be aware that the remote device transmission sites are periodically monitored only during regular business hours during which simultaneous in-office device clinics are being run. If your transmission requires attention, we will contact you as soon as possible. Thank you.             [facility]

## 2021-04-26 ENCOUNTER — NURSE ONLY (OUTPATIENT)
Dept: CARDIOLOGY CLINIC | Age: 76
End: 2021-04-26
Payer: MEDICARE

## 2021-04-26 DIAGNOSIS — Z45.09 ENCOUNTER FOR ELECTRONIC ANALYSIS OF REVEAL EVENT RECORDER: ICD-10-CM

## 2021-04-26 DIAGNOSIS — I48.0 PAF (PAROXYSMAL ATRIAL FIBRILLATION) (HCC): ICD-10-CM

## 2021-04-26 NOTE — LETTER
[address] [phone]  [address]  [address] [address] [phone]    Pacemaker/Defibrillator Clinic          04/26/21        [address], [facility] [address]        Dear Yeimi Del Rio    This letter is to inform you that we received the transmission from your monitor at home that checks your implanted heart device. The next date your monitor will automatically transmit will be 6-1-21. If your report needs attention we will notify you. Your device and monitor are wireless and most transmit cellularly, but please periodically check your monitor is still plugged in to the electrical outlet. If you still use the telephone land line to send please ensure the connection to the phone joel is secure. This will help to ensure successful automatic transmissions in the future. Also, the monitor needs to be close to you while sleeping at night. Please be aware that the remote device transmission sites are periodically monitored only during regular business hours during which simultaneous in-office device clinics are being run. If your transmission requires attention, we will contact you as soon as possible. Thank you.             Lillian [address]

## 2021-05-07 PROCEDURE — G2066 INTER DEVC REMOTE 30D: HCPCS | Performed by: INTERNAL MEDICINE

## 2021-05-07 PROCEDURE — 93298 REM INTERROG DEV EVAL SCRMS: CPT | Performed by: INTERNAL MEDICINE

## 2021-05-20 ENCOUNTER — OFFICE VISIT (OUTPATIENT)
Dept: PRIMARY CARE CLINIC | Age: 76
End: 2021-05-20

## 2021-05-20 DIAGNOSIS — Z20.828 EXPOSURE TO SARS-ASSOCIATED CORONAVIRUS: Primary | ICD-10-CM

## 2021-05-20 LAB — SARS-COV-2: NOT DETECTED

## 2021-05-20 PROCEDURE — 99999 PR OFFICE/OUTPT VISIT,PROCEDURE ONLY: CPT | Performed by: NURSE PRACTITIONER

## 2021-05-26 ENCOUNTER — HOSPITAL ENCOUNTER (OUTPATIENT)
Dept: PULMONOLOGY | Age: 76
Discharge: HOME | End: 2021-05-26
Payer: MEDICARE

## 2021-05-26 VITALS — OXYGEN SATURATION: 97 % | HEART RATE: 53 BPM | RESPIRATION RATE: 16 BRPM

## 2021-05-26 LAB
DLCO %PRED: 129 %
DLCO PRED: NORMAL
DLCO/VA %PRED: NORMAL
DLCO/VA PRED: NORMAL
DLCO/VA: NORMAL
DLCO: NORMAL
EXPIRATORY TIME: NORMAL
FEF 25-75% %PRED-PRE: NORMAL
FEF 25-75% PRED: NORMAL
FEF 25-75%-PRE: NORMAL
FEV1 %PRED-PRE: 95 %
FEV1 PRED: NORMAL
FEV1/FVC %PRED-PRE: NORMAL
FEV1/FVC PRED: NORMAL
FEV1/FVC: 109 %
FEV1: NORMAL
FVC %PRED-PRE: NORMAL
FVC PRED: NORMAL
FVC: NORMAL
GAW %PRED: NORMAL
GAW PRED: NORMAL
GAW: NORMAL
IC %PRED: NORMAL
IC PRED: NORMAL
IC: NORMAL
MVV %PRED-PRE: NORMAL
MVV PRED: NORMAL
MVV-PRE: NORMAL
PEF %PRED-PRE: NORMAL
PEF PRED: NORMAL
PEF-PRE: NORMAL
RAW %PRED: NORMAL
RAW PRED: NORMAL
RAW: NORMAL
RV %PRED: NORMAL
RV PRED: NORMAL
RV: NORMAL
SVC %PRED: NORMAL
SVC PRED: NORMAL
SVC: NORMAL
TLC %PRED: 111 %
TLC PRED: NORMAL
TLC: NORMAL
VA %PRED: NORMAL
VA PRED: NORMAL
VA: NORMAL
VTG %PRED: NORMAL
VTG PRED: NORMAL
VTG: NORMAL

## 2021-05-26 PROCEDURE — 94200 LUNG FUNCTION TEST (MBC/MVV): CPT

## 2021-05-26 PROCEDURE — 94760 N-INVAS EAR/PLS OXIMETRY 1: CPT

## 2021-05-26 PROCEDURE — 94726 PLETHYSMOGRAPHY LUNG VOLUMES: CPT

## 2021-05-26 PROCEDURE — 94010 BREATHING CAPACITY TEST: CPT

## 2021-05-26 PROCEDURE — 94729 DIFFUSING CAPACITY: CPT

## 2021-05-26 RX ORDER — ALBUTEROL SULFATE 90 UG/1
4 AEROSOL, METERED RESPIRATORY (INHALATION) ONCE
Status: CANCELLED | OUTPATIENT
Start: 2021-05-26

## 2021-05-26 ASSESSMENT — PULMONARY FUNCTION TESTS
FEV1_PERCENT_PREDICTED_PRE: 95
FEV1/FVC: 109

## 2021-05-27 NOTE — PROCEDURES
Pulmonary Function Testing      Patient name:  Jamar Gardner     [facility] Unit #:   [id number]   Date of test: 5/26/2021  Date of interpretation:   5/27/2021    Mr. Jamar Gardner is a 76y.o. year-old non smoker. The spirometry data were acceptable and reproducible. Spirometry:  Flow volume loops were normal. The FEV-1/FVC ratio was normal. The  Prebronchodilator FEV-1 was 2.46 liters (95% of predicted), which was normal. The FVC was 3.09 liters (86% of predicted), which was normal. Response to inhaled bronchodilators (albuterol) was not performed. Lung volumes:  Lung volumes were tested by plethysmography. The total lung capacity was 6.09 liters (111% of predicted), which was normal. The residual volume was 2.71 liters (115% of predicted), which was normal. The ratio of residual volume to total lung capacity (RV/TLC) was 105, which was normal. Specific airway resistance was increased. Diffusion capacity was found to be increased.        Interpretation:  Normal pulmonary function test.

## 2021-06-01 ENCOUNTER — NURSE ONLY (OUTPATIENT)
Dept: CARDIOLOGY CLINIC | Age: 76
End: 2021-06-01

## 2021-06-01 DIAGNOSIS — I48.0 PAF (PAROXYSMAL ATRIAL FIBRILLATION) (HCC): ICD-10-CM

## 2021-06-01 DIAGNOSIS — Z45.09 ENCOUNTER FOR ELECTRONIC ANALYSIS OF REVEAL EVENT RECORDER: ICD-10-CM

## 2021-06-01 NOTE — LETTER
[address] [facility] [phone]  [address]  [address] [phone]    Pacemaker/Defibrillator Clinic    06/01/21      [address] [facility] [address]      Dear Marcos Emery    This letter is to inform you that we received the transmission from your monitor at home that checks your implanted heart device. The next date your monitor will automatically transmit will be 7-6-21. If your report needs attention we will notify you. Your device and monitor are wireless and most transmit cellularly, but please periodically check your monitor is still plugged in to the electrical outlet. If you still use the telephone land line to send please ensure the connection to the phone joel is secure. This will help to ensure successful automatic transmissions in the future. Also, the monitor needs to be close to you while sleeping at night. Please be aware that the remote device transmission sites are periodically monitored only during regular business hours during which simultaneous in-office device clinics are being run. If your transmission requires attention, we will contact you as soon as possible. **PLEASE NOTE** that our [facility] policy and processes are changing to ensure a more seamless approach for all parties involved, allowing more time for our nurses to address patient issues and concerns. We will no longer be sending letters for NORMAL remote transmissions. You will be contacted by phone if your transmission requires attention (as previously done), and letters will only be sent regarding monitor disconnections or missed transmissions if you are unable to be reached by phone. Please do not be alarmed by this new process, as we will continue to contact you if your transmission report requires attention. This will be your final \"remote received\" letter.   From this point forward, the [facility] will be utilizing the no news is good news approach. As always, please feel free to contact your nurse with any questions or concerns. Thank you.     [facility], [place], operated by [facility]

## 2021-06-05 PROCEDURE — G2066 INTER DEVC REMOTE 30D: HCPCS | Performed by: INTERNAL MEDICINE

## 2021-06-05 PROCEDURE — 93298 REM INTERROG DEV EVAL SCRMS: CPT | Performed by: INTERNAL MEDICINE

## 2021-06-09 ENCOUNTER — OFFICE VISIT (OUTPATIENT)
Dept: PULMONOLOGY | Age: 76
End: 2021-06-09
Payer: MEDICARE

## 2021-06-09 VITALS
BODY MASS INDEX: 36.1 KG/M2 | WEIGHT: 230 LBS | DIASTOLIC BLOOD PRESSURE: 62 MMHG | OXYGEN SATURATION: 96 % | HEART RATE: 54 BPM | SYSTOLIC BLOOD PRESSURE: 122 MMHG | HEIGHT: 67 IN

## 2021-06-09 DIAGNOSIS — Z99.89 OSA ON CPAP: Primary | ICD-10-CM

## 2021-06-09 DIAGNOSIS — G47.33 OSA ON CPAP: Primary | ICD-10-CM

## 2021-06-09 DIAGNOSIS — E66.01 CLASS 2 SEVERE OBESITY DUE TO EXCESS CALORIES WITH SERIOUS COMORBIDITY AND BODY MASS INDEX (BMI) OF 37.0 TO 37.9 IN ADULT (HCC): ICD-10-CM

## 2021-06-09 DIAGNOSIS — I48.0 PAROXYSMAL ATRIAL FIBRILLATION (HCC): ICD-10-CM

## 2021-06-09 PROCEDURE — 4040F PNEUMOC VAC/ADMIN/RCVD: CPT | Performed by: INTERNAL MEDICINE

## 2021-06-09 PROCEDURE — 1036F TOBACCO NON-USER: CPT | Performed by: INTERNAL MEDICINE

## 2021-06-09 PROCEDURE — G8427 DOCREV CUR MEDS BY ELIG CLIN: HCPCS | Performed by: INTERNAL MEDICINE

## 2021-06-09 PROCEDURE — 3017F COLORECTAL CA SCREEN DOC REV: CPT | Performed by: INTERNAL MEDICINE

## 2021-06-09 PROCEDURE — G8417 CALC BMI ABV UP PARAM F/U: HCPCS | Performed by: INTERNAL MEDICINE

## 2021-06-09 PROCEDURE — 99214 OFFICE O/P EST MOD 30 MIN: CPT | Performed by: INTERNAL MEDICINE

## 2021-06-09 PROCEDURE — 1123F ACP DISCUSS/DSCN MKR DOCD: CPT | Performed by: INTERNAL MEDICINE

## 2021-06-09 NOTE — PROGRESS NOTES
PULMONARY OFFICE FOLLOW UP NOTE    REASON FOR VISIT:   Chief Complaint   Patient presents with    Sleep Apnea     on cpap    Results     PFT       DATE OF VISIT: 6/9/2021     HISTORY OF PRESENT ILLNESS: 76y.o. year old male comes into the pulmonary office for a follow-up. Patient reports that his breathing has been stable and he denies any new symptoms. His atrial fibrillation is better controlled now. Has been using his CPAP therapy regularly and feels benefited from it. No mask leakage or pressure intolerance. Weight has been stable. Patient is going to a dietitian. Previously:   Patient was recently admitted to the hospital with atrial fibrillation and required elective cardiac ablation on October 5, 2020. Post ablation patient was seen to have respiratory distress and chest imaging showed right upper lobe infiltrate. Patient was treated as community-acquired pneumonia. Also seen to have hypoxic respiratory failure requiring oxygen supplementation for a few days. At the time of discharge patient did not require any oxygen supplementation. Today comes in for a follow-up reporting that his breathing is much improved. He does have occasional cough which gets exacerbated when he is talking for extended period of time. His exercise tolerance is slowly improving. His heart rate fluctuates between high 40s to high 70s. Even when he is exercising strenuously it does not go above 70s. Patient is on metoprolol 50 mg twice a day. Is currently on hydrochlorothiazide. Does have mild pedal edema. Has been using his CPAP machine regularly. Denies any mask leakage or pressure intolerance. REVIEW OF SYSTEMS: 14 point ROS is negative beside mentioned in [address] PAST MEDICAL HISTORY:   Past Medical History:   Diagnosis Date    A-fib [facility])     Clotting disorder ([facility]     Diabetes mellitus ([facility]     Hyperlipidemia     Hypertension        PAST SURGICAL HISTORY: History reviewed.  No pertinent surgical history. SOCIAL HISTORY:   Social History     Tobacco Use    Smoking status: Never Smoker    Smokeless tobacco: Never Used   Vaping Use    Vaping Use: Never used   Substance Use Topics    Alcohol use: Yes     Alcohol/week: 3.0 standard drinks     Types: 3 Cans of beer per week    Drug use: Never       FAMILY HISTORY:   Family History   Problem Relation Age of Onset    Arrhythmia Mother     High Cholesterol Father     Hypertension Father     Heart Attack Maternal Uncle        MEDICATIONS:     Current Outpatient Medications on File Prior to Visit   Medication Sig Dispense Refill    metoprolol tartrate (LOPRESSOR) 50 MG tablet Take 0.5 tablets by mouth 2 times daily 180 tablet 1    amLODIPine (NORVASC) 10 MG tablet Take 1 tablet by mouth daily 90 tablet 1    fluticasone (FLONASE) 50 MCG/ACT nasal spray 1 spray by Each Nostril route daily      warfarin (COUMADIN) 6 MG tablet Take 5 mg by mouth Taking 5 MG. Monitored by PCP      allopurinol (ZYLOPRIM) 300 MG tablet Take 1 tablet by mouth daily      metFORMIN (GLUCOPHAGE-XR) 500 MG extended release tablet Take 1 tablet by mouth daily      losartan (COZAAR) 50 MG tablet Take 1 tablet by mouth daily      hydrochlorothiazide (HYDRODIURIL) 25 MG tablet Take 1 tablet by mouth daily      lovastatin (MEVACOR) 40 MG tablet Take 40 mg by mouth daily      tamsulosin (FLOMAX) 0.4 MG capsule Take 0.4 mg by mouth daily      aspirin 81 MG chewable tablet Take 1 mg by mouth daily      Specialty Vitamins Products (PROSTATE) TABS Take 2 daily      Multiple Vitamin (ONE-A-DAY MENS) TABS Take 1 tablet by mouth daily      NONFORMULARY Nuretin: takes 2 daily      Multiple Vitamins-Minerals (ICAPS AREDS 2) CAPS Take by mouth 2 daily       No current facility-administered medications on file prior to visit.                ALLERGIES:   Allergies as of 06/09/2021 - Fully Reviewed 06/09/2021   Allergen Reaction Noted    Amoxicillin Rash 04/17/2019 OBJECTIVE:   height is 5' 7\" (1.702 m) and weight is 230 lb (104.3 kg). His blood pressure is 122/62 and his pulse is 54. His oxygen saturation is 96%. PHYSICAL EXAM:    CONSTITUTIONAL: He is a 76y.o.-year-old who appears his stated age. He is alert and oriented x 3 and in no acute distress. HEENT: PERRL. No scleral icterus. No thrush, atraumatic, normocephalic. NECK: Supple, without cervical or supraclavicular lymphadenopathy:  CARDIOVASCULAR: S1 S2 RRR. Without murmer  RESPIRATORY & CHEST: Lungs are clear to auscultation and percussion. No wheezing, no crackles. Good air movement  GASTROINTESTINAL & ABDOMEN: Soft, nontender, positive bowel sounds in all quadrants, non-distended, without hepatosplenomegaly. GENITOURINARY: Deferred. MUSCULOSKELETAL: No tenderness to palpation of the axial skeleton. There is no clubbing. No cyanosis. No edema of the lower extremities. SKIN OF BODY: No rash or jaundice. PSYCHIATRIC EVALUATION: Normal affect. Patient answers questions appropriately. HEMATOLOGIC/LYMPHATIC/ IMMUNOLOGIC: No palpable lymphadenopathy. NEUROLOGIC: Alert and oriented x 3. Groslly non-focal. Motor strength is 5+/5 in all muscle groups. The patient has a normal sensorium globally. Labs:    No flowsheet data found. All labs were personally reviewed by me any my interpretation is: CBC is mild anemia. Chem 8 is normal.     IMAGING:    No new chest imaging for me to evaluate. Pulmonary Functions Testing Results:    Complete PFT done on 5/26/2021  FEV1: 2.46/25  FVC: 3.09/86  FEV1/FVC: 79  Total lung capacity: 111%  Residual volume: 115%  Diffusion capacity: 149%  Airway resistance: 124%    This complete PFT was personally reviewed by me and my interpretation is: Spirometry values are within normal limits. Normal total lung capacity and residual volume with increased diffusion capacity. Airway resistance is increased.   Impression: Possibly very mild obstructive airway MD  Pulmonary Critical Care and Sleep Medicine  6/9/2021, 10:02 AM    This note was completed using dragon medical speech recognition software. Grammatical errors, random word insertions, pronoun errors and incomplete sentences are occasional consequences of this technology due to software limitations. If there are questions or concerns about the content of this note of information contained within the body of this dictation they should be addressed with the provider for clarification.

## 2021-07-06 ENCOUNTER — NURSE ONLY (OUTPATIENT)
Dept: CARDIOLOGY CLINIC | Age: 76
End: 2021-07-06

## 2021-07-06 DIAGNOSIS — Z45.09 ENCOUNTER FOR ELECTRONIC ANALYSIS OF REVEAL EVENT RECORDER: ICD-10-CM

## 2021-07-06 DIAGNOSIS — I48.0 PAF (PAROXYSMAL ATRIAL FIBRILLATION) (HCC): ICD-10-CM

## 2021-07-06 NOTE — PROGRESS NOTES
We received a remote transmission from patient's monitor at home. Remote Linq report shows nocturnal karyn recordings. EP physician to review. We will continue to monitor remotely.

## 2021-07-10 PROCEDURE — G2066 INTER DEVC REMOTE 30D: HCPCS | Performed by: INTERNAL MEDICINE

## 2021-07-10 PROCEDURE — 93298 REM INTERROG DEV EVAL SCRMS: CPT | Performed by: INTERNAL MEDICINE

## 2021-07-23 RX ORDER — AMLODIPINE BESYLATE 10 MG/1
TABLET ORAL
Qty: 90 TABLET | Refills: 1 | Status: SHIPPED | OUTPATIENT
Start: 2021-07-23 | End: 2022-02-11

## 2021-08-07 PROCEDURE — G2066 INTER DEVC REMOTE 30D: HCPCS | Performed by: INTERNAL MEDICINE

## 2021-08-07 PROCEDURE — 93298 REM INTERROG DEV EVAL SCRMS: CPT | Performed by: INTERNAL MEDICINE

## 2021-08-10 ENCOUNTER — NURSE ONLY (OUTPATIENT)
Dept: CARDIOLOGY CLINIC | Age: 76
End: 2021-08-10
Payer: MEDICARE

## 2021-08-10 DIAGNOSIS — I48.0 PAF (PAROXYSMAL ATRIAL FIBRILLATION) (HCC): ICD-10-CM

## 2021-08-10 DIAGNOSIS — Z45.09 ENCOUNTER FOR ELECTRONIC ANALYSIS OF REVEAL EVENT RECORDER: ICD-10-CM

## 2021-08-19 PROBLEM — I48.0 PAROXYSMAL ATRIAL FIBRILLATION (HCC): Status: RESOLVED | Noted: 2019-04-17 | Resolved: 2021-08-19

## 2021-08-19 NOTE — PROGRESS NOTES
at onset of atrial fibrillation Yes DANNIE Shelton CNP   amLODIPine (NORVASC) 10 MG tablet Take 1 tablet by mouth once daily Yes DANNIE Shelton CNP   metoprolol tartrate (LOPRESSOR) 50 MG tablet Take 0.5 tablets by mouth 2 times daily Yes DANNIE Sunshine CNP   fluticasone (FLONASE) 50 MCG/ACT nasal spray 1 spray by Each Nostril route daily Yes Historical Provider, MD   allopurinol (ZYLOPRIM) 300 MG tablet Take 1 tablet by mouth daily Yes Historical Provider, MD   metFORMIN (GLUCOPHAGE-XR) 500 MG extended release tablet Take 1 tablet by mouth daily Yes Historical Provider, MD   losartan (COZAAR) 50 MG tablet Take 1 tablet by mouth daily Yes Historical Provider, MD   hydrochlorothiazide (HYDRODIURIL) 25 MG tablet Take 1 tablet by mouth daily Yes Historical Provider, MD   lovastatin (MEVACOR) 40 MG tablet Take 40 mg by mouth daily Yes Historical Provider, MD   tamsulosin (FLOMAX) 0.4 MG capsule Take 0.4 mg by mouth daily Yes Historical Provider, MD   aspirin 81 MG chewable tablet Take 1 mg by mouth daily Yes Historical Provider, MD   Specialty Vitamins Products (PROSTATE) TABS Take 2 daily Yes Historical Provider, MD   Multiple Vitamin (ONE-A-DAY MENS) TABS Take 1 tablet by mouth daily Yes Historical Provider, MD   NONFORMULARY Nuretin: takes 2 daily Yes Historical Provider, MD   Multiple Vitamins-Minerals (ICAPS AREDS 2) CAPS Take by mouth 2 daily Yes Historical Provider, MD   amLODIPine (NORVASC) 10 MG tablet Take 1 tablet by mouth daily  DANNIE Sunshine CNP      Past Medical History:  Past Medical History:   Diagnosis Date    [facility]     [facility] ([facility]     [facility]     Hyperlipidemia     Hypertension      Past Surgical History:    has no past surgical history on file. Social History:  Reviewed. reports that he has never smoked. He has never used smokeless tobacco. He reports current alcohol use of about 3.0 standard drinks of alcohol per week.  He reports that he does not use drugs. Family History:  Reviewed. family history includes Arrhythmia in his mother; Heart Attack in his maternal uncle; High Cholesterol in his father; Hypertension in his father. Review of Systems:  · Constitutional: Negative for fever, night sweats, chills, weight changes, or weakness  · Skin: Negative for rash, dry skin, pruritus, bruising, bleeding, blood clots, or changes in skin pigment  · HEENT: Negative for vision changes, ringing in the ears, sore throat, dysphagia, or swollen lymph nodes  · Respiratory: Reviewed in HPI  · Cardiovascular: Reviewed in HPI  · Gastrointestinal: Negative for abdominal pain, N/V/D, constipation, or black/tarry stools  · Genito-Urinary: Negative for dysuria, incontinence, urgency, or hematuria  · Musculoskeletal: Negative for joint swelling, muscle pain, or injuries  · Neurological/Psych: Negative for confusion, seizures, dizziness, headaches, balance issues or TIA-like symptoms. No anxiety, depression, or insomnia    Physical Examination:  Vitals:    09/15/21 1415   BP: (!) 144/77   Pulse: 58   SpO2: 96%      Wt Readings from Last 3 Encounters:   09/15/21 228 lb (103.4 kg)   06/09/21 230 lb (104.3 kg)   02/18/21 230 lb (104.3 kg)     Constitutional: Cooperative and in no apparent distress, and appears well nourished  Skin: Warm and pink; no pallor, cyanosis, bruising, or clubbing  HEENT: Symmetric and normocephalic. PERRL, EOM intact. Conjunctiva pink with clear sclera. Mucus membranes pink and moist. Teeth intact. Thyroid smooth without nodules or goiter  Respiratory: Respirations symmetric and unlabored. Lungs clear to auscultation bilaterally, no wheezing, rhonchi, or crackles  Cardiovascular:  Regular rate and rhythm. S1/S2 present without murmurs, rubs, or gallops. Peripheral pulses 2+, capillary refill < 3 seconds. No elevation of JVP. No peripheral edema  Gastrointestinal: Abdomen soft and round.  Bowel sounds normoactive in all quadrants without tenderness or masses. Musculoskeletal: Bilateral upper and lower extremity strength 5/5 with full ROM. Neurological/Psych: Awake and orientated to person, place and time. Calm affect, appropriate mood. Pertinent labs, diagnostic, device, and imaging results reviewed as a part of this visit    LABS    CBC:   Lab Results   Component Value Date    WBC 9.6 10/08/2020    HGB 13.4 (L) 10/08/2020    HCT 40.3 (L) 10/08/2020    MCV 84.0 10/08/2020     10/08/2020     BMP:   Lab Results   Component Value Date    CREATININE 1.2 10/08/2020    BUN 28 (H) 10/08/2020     10/08/2020    K 4.3 10/08/2020    CL 99 10/08/2020    CO2 26 10/08/2020     CrCl cannot be calculated (Patient's most recent lab result is older than the maximum 120 days allowed. ). Thyroid: No results found for: TSH, W1WARPT, C6RDEAO, THYROIDAB  Lipid Panel:   Lab Results   Component Value Date    CHOL 156 10/05/2020    HDL 54 10/05/2020    TRIG 139 10/05/2020     LFTs:  Lab Results   Component Value Date    ALT 55 (H) 10/05/2020    AST 34 10/05/2020    ALKPHOS 70 10/05/2020    BILITOT 0.6 10/05/2020     Coags:   Lab Results   Component Value Date    PROTIME 22.3 (H) 10/09/2020    INR 1.91 (H) 10/09/2020       EC/15/2021  - Sinus bradycardia, rate 53, QTc 424    Echo: 3/19  Overall left ventricular ejection fraction is estimated to be 50-55%. The left ventricular function is low normal. There is mild global hypokinesis of the left ventricle. The diastolic function is impaired and classified as Grade 2   (psuedonormalization). The left atrium is moderately dilated. There is mild mitral regurgitation. Right ventricular systolic pressure is indeterminate due to poorly visualized tricuspid regurgitation. LISSA: 10/20   Overall left ventricular function is normal. Mitral valve is structurally normal.   Trivial mitral regurgitation is present. There is no evidence of mass or thrombus in the left atrium or appendage. sodium diet, weight control and exercise     4. DANO   - Stable: Uses CPAP   - Encourage to use CPAP to prevent long term effects of untreated DANO     5. Hyperlipidemia   - Controlled. Goal: LDL <100               ~ LDL 74 (10/20)   - On lovastatin 40 mg QD   - Discussed diet, weight loss, and exercise needs   - Followed per PCP    6. Obesity  - Body mass index is 35.71 kg/m². - Complicating assessment and treatment. Placing patient at risk for multiple co-morbidities as well as early death and contributing to the patient's presentation  - Discussed weight loss and patient was encouraged to reduce calorie intake and increase exercise    Plan:  1. Stop coumadin  2. If you have episodes of atrial fibrillation, start taking eliquis until the episodes resolves  3. Call office if blood pressure consistently >140  4. Continue exercise and weight loss    F/U: Follow-up with EP in 6 months  -Follow up with device clinic as scheduled  -Call [facility] at [phone] with any questions    Diet & Exercise:   The patient is counseled to follow a low salt diet to assure blood pressure remains controlled for cardiovascular risk factor modification   The patient is counseled to avoid excess caffeine, and energy drinks as this may exacerbated ectopy and arrhythmia   The patient is counseled to lose weight to control cardiovascular risk factors   Exercise program discussed: To improve overall cardiovascular health, the patient is instructed to increase cardiovascular related activities with a goal of 150 min/week of moderate level activity or 10,000 steps per day. Encouraged to perform as much activity as tolerated    I have addressed the patient's cardiac risk factors and adjusted pharmacologic treatment as needed. In addition, I have reinforced the need for patient directed risk factor modification.     I independently reviewed the device check interrogation and ECG    All questions and concerns were addressed with

## 2021-09-11 PROCEDURE — G2066 INTER DEVC REMOTE 30D: HCPCS | Performed by: INTERNAL MEDICINE

## 2021-09-11 PROCEDURE — 93298 REM INTERROG DEV EVAL SCRMS: CPT | Performed by: INTERNAL MEDICINE

## 2021-09-14 ENCOUNTER — NURSE ONLY (OUTPATIENT)
Dept: CARDIOLOGY CLINIC | Age: 76
End: 2021-09-14
Payer: MEDICARE

## 2021-09-14 DIAGNOSIS — I48.0 PAF (PAROXYSMAL ATRIAL FIBRILLATION) (HCC): ICD-10-CM

## 2021-09-14 DIAGNOSIS — Z45.09 ENCOUNTER FOR ELECTRONIC ANALYSIS OF REVEAL EVENT RECORDER: ICD-10-CM

## 2021-09-15 ENCOUNTER — OFFICE VISIT (OUTPATIENT)
Dept: CARDIOLOGY CLINIC | Age: 76
End: 2021-09-15
Payer: MEDICARE

## 2021-09-15 ENCOUNTER — NURSE ONLY (OUTPATIENT)
Dept: CARDIOLOGY CLINIC | Age: 76
End: 2021-09-15

## 2021-09-15 VITALS
BODY MASS INDEX: 35.79 KG/M2 | DIASTOLIC BLOOD PRESSURE: 77 MMHG | WEIGHT: 228 LBS | HEART RATE: 58 BPM | SYSTOLIC BLOOD PRESSURE: 144 MMHG | OXYGEN SATURATION: 96 % | HEIGHT: 67 IN

## 2021-09-15 DIAGNOSIS — I10 ESSENTIAL HYPERTENSION: ICD-10-CM

## 2021-09-15 DIAGNOSIS — Z95.818 STATUS POST PLACEMENT OF IMPLANTABLE LOOP RECORDER: ICD-10-CM

## 2021-09-15 DIAGNOSIS — I48.0 PAF (PAROXYSMAL ATRIAL FIBRILLATION) (HCC): Primary | ICD-10-CM

## 2021-09-15 DIAGNOSIS — E78.49 OTHER HYPERLIPIDEMIA: ICD-10-CM

## 2021-09-15 PROCEDURE — 1036F TOBACCO NON-USER: CPT | Performed by: NURSE PRACTITIONER

## 2021-09-15 PROCEDURE — 99214 OFFICE O/P EST MOD 30 MIN: CPT | Performed by: NURSE PRACTITIONER

## 2021-09-15 PROCEDURE — G8427 DOCREV CUR MEDS BY ELIG CLIN: HCPCS | Performed by: NURSE PRACTITIONER

## 2021-09-15 PROCEDURE — 3017F COLORECTAL CA SCREEN DOC REV: CPT | Performed by: NURSE PRACTITIONER

## 2021-09-15 PROCEDURE — 1123F ACP DISCUSS/DSCN MKR DOCD: CPT | Performed by: NURSE PRACTITIONER

## 2021-09-15 PROCEDURE — 4040F PNEUMOC VAC/ADMIN/RCVD: CPT | Performed by: NURSE PRACTITIONER

## 2021-09-15 PROCEDURE — 93000 ELECTROCARDIOGRAM COMPLETE: CPT | Performed by: NURSE PRACTITIONER

## 2021-09-15 PROCEDURE — G8417 CALC BMI ABV UP PARAM F/U: HCPCS | Performed by: NURSE PRACTITIONER

## 2021-09-15 NOTE — PROGRESS NOTES
Patient comes in for interrogation of their implanted loop recorder. ILR implanted for AF  Interrogation shows karyn episodes all during sleep hours. Last on 8/4/21. Longest 2 minutes. Mean heart rate of 39 bpm.  Patient remains on warfarin, metoprolol. Please see interrogation for more detail. Patient will see NPSR and we will continue to follow the Patient remotely.

## 2021-09-15 NOTE — PATIENT INSTRUCTIONS
1. Stop coumadin  2. If you have episodes of atrial fibrillation, start taking eliquis until the episodes resolves  3.  Continue exercise and weight loss

## 2021-10-16 PROCEDURE — 93298 REM INTERROG DEV EVAL SCRMS: CPT | Performed by: INTERNAL MEDICINE

## 2021-10-16 PROCEDURE — G2066 INTER DEVC REMOTE 30D: HCPCS | Performed by: INTERNAL MEDICINE

## 2021-10-19 ENCOUNTER — NURSE ONLY (OUTPATIENT)
Dept: CARDIOLOGY CLINIC | Age: 76
End: 2021-10-19
Payer: MEDICARE

## 2021-10-19 DIAGNOSIS — I48.0 PAF (PAROXYSMAL ATRIAL FIBRILLATION) (HCC): ICD-10-CM

## 2021-10-19 DIAGNOSIS — Z45.09 ENCOUNTER FOR ELECTRONIC ANALYSIS OF REVEAL EVENT RECORDER: ICD-10-CM

## 2021-10-19 NOTE — PROGRESS NOTES
We received a remote transmission from patient's monitor at home. Remote Linq report shows karyn recording with short AT. EP physician to review. We will continue to monitor remotely.

## 2021-11-23 ENCOUNTER — NURSE ONLY (OUTPATIENT)
Dept: CARDIOLOGY CLINIC | Age: 76
End: 2021-11-23
Payer: MEDICARE

## 2021-11-23 DIAGNOSIS — Z45.09 ENCOUNTER FOR ELECTRONIC ANALYSIS OF REVEAL EVENT RECORDER: ICD-10-CM

## 2021-11-23 DIAGNOSIS — I48.0 PAF (PAROXYSMAL ATRIAL FIBRILLATION) (HCC): ICD-10-CM

## 2021-11-23 PROCEDURE — G2066 INTER DEVC REMOTE 30D: HCPCS | Performed by: INTERNAL MEDICINE

## 2021-11-23 PROCEDURE — 93298 REM INTERROG DEV EVAL SCRMS: CPT | Performed by: INTERNAL MEDICINE

## 2021-12-01 ENCOUNTER — OFFICE VISIT (OUTPATIENT)
Dept: PULMONOLOGY | Age: 76
End: 2021-12-01
Payer: MEDICARE

## 2021-12-01 VITALS
WEIGHT: 232 LBS | SYSTOLIC BLOOD PRESSURE: 140 MMHG | OXYGEN SATURATION: 95 % | BODY MASS INDEX: 36.41 KG/M2 | DIASTOLIC BLOOD PRESSURE: 76 MMHG | HEIGHT: 67 IN | HEART RATE: 60 BPM

## 2021-12-01 DIAGNOSIS — Z99.89 OSA ON CPAP: Primary | ICD-10-CM

## 2021-12-01 DIAGNOSIS — E66.01 CLASS 2 SEVERE OBESITY DUE TO EXCESS CALORIES WITH SERIOUS COMORBIDITY AND BODY MASS INDEX (BMI) OF 37.0 TO 37.9 IN ADULT (HCC): ICD-10-CM

## 2021-12-01 DIAGNOSIS — I48.0 PAROXYSMAL ATRIAL FIBRILLATION (HCC): ICD-10-CM

## 2021-12-01 DIAGNOSIS — G47.33 OSA ON CPAP: Primary | ICD-10-CM

## 2021-12-01 PROCEDURE — 1123F ACP DISCUSS/DSCN MKR DOCD: CPT | Performed by: INTERNAL MEDICINE

## 2021-12-01 PROCEDURE — 4040F PNEUMOC VAC/ADMIN/RCVD: CPT | Performed by: INTERNAL MEDICINE

## 2021-12-01 PROCEDURE — 1036F TOBACCO NON-USER: CPT | Performed by: INTERNAL MEDICINE

## 2021-12-01 PROCEDURE — G8484 FLU IMMUNIZE NO ADMIN: HCPCS | Performed by: INTERNAL MEDICINE

## 2021-12-01 PROCEDURE — G8428 CUR MEDS NOT DOCUMENT: HCPCS | Performed by: INTERNAL MEDICINE

## 2021-12-01 PROCEDURE — G8417 CALC BMI ABV UP PARAM F/U: HCPCS | Performed by: INTERNAL MEDICINE

## 2021-12-01 PROCEDURE — 99214 OFFICE O/P EST MOD 30 MIN: CPT | Performed by: INTERNAL MEDICINE

## 2021-12-01 ASSESSMENT — SLEEP AND FATIGUE QUESTIONNAIRES
HOW LIKELY ARE YOU TO NOD OFF OR FALL ASLEEP WHILE SITTING AND READING: 0
HOW LIKELY ARE YOU TO NOD OFF OR FALL ASLEEP WHEN YOU ARE A PASSENGER IN A CAR FOR AN HOUR WITHOUT A BREAK: 0
HOW LIKELY ARE YOU TO NOD OFF OR FALL ASLEEP WHILE WATCHING TV: 2
HOW LIKELY ARE YOU TO NOD OFF OR FALL ASLEEP WHILE LYING DOWN TO REST IN THE AFTERNOON WHEN CIRCUMSTANCES PERMIT: 2
ESS TOTAL SCORE: 4
HOW LIKELY ARE YOU TO NOD OFF OR FALL ASLEEP WHILE SITTING QUIETLY AFTER LUNCH WITHOUT ALCOHOL: 0
HOW LIKELY ARE YOU TO NOD OFF OR FALL ASLEEP IN A CAR, WHILE STOPPED FOR A FEW MINUTES IN TRAFFIC: 0
HOW LIKELY ARE YOU TO NOD OFF OR FALL ASLEEP WHILE SITTING AND TALKING TO SOMEONE: 0
HOW LIKELY ARE YOU TO NOD OFF OR FALL ASLEEP WHILE SITTING INACTIVE IN A PUBLIC PLACE: 0

## 2021-12-01 NOTE — PROGRESS NOTES
a theatre or a meeting) 0   As a passenger in a car for an hour without a break 0   Lying down to rest in the afternoon when circumstances permit 2   Sitting and talking to someone 0   Sitting quietly after a lunch without alcohol 0   In a car, while stopped for a few minutes in traffic 0   Total score 4         REVIEW OF SYSTEMS: 14 point ROS is negative beside mentioned in [address] PAST MEDICAL HISTORY:   Past Medical History:   Diagnosis Date    A[facility]fib ([facility]     Clotting disorder ([facility]     Diabetes mellitus ([facility]     Hyperlipidemia     Hypertension        PAST SURGICAL HISTORY: No past surgical history on file. SOCIAL HISTORY:   Social History     Tobacco Use    Smoking status: Never Smoker    Smokeless tobacco: Never Used   Vaping Use    Vaping Use: Never used   Substance Use Topics    Alcohol use:  Yes     Alcohol/week: 3.0 standard drinks     Types: 3 Cans of beer per week    Drug use: Never       FAMILY HISTORY:   Family History   Problem Relation Age of Onset    Arrhythmia Mother     High Cholesterol Father     Hypertension Father     Heart Attack Maternal Uncle        MEDICATIONS:     Current Outpatient Medications on File Prior to Visit   Medication Sig Dispense Refill    amLODIPine (NORVASC) 10 MG tablet Take 1 tablet by mouth once daily 90 tablet 1    metoprolol tartrate (LOPRESSOR) 50 MG tablet Take 0.5 tablets by mouth 2 times daily 180 tablet 1    fluticasone (FLONASE) 50 MCG/ACT nasal spray 1 spray by Each Nostril route daily      allopurinol (ZYLOPRIM) 300 MG tablet Take 1 tablet by mouth daily      metFORMIN (GLUCOPHAGE-XR) 500 MG extended release tablet Take 1 tablet by mouth daily      losartan (COZAAR) 50 MG tablet Take 1 tablet by mouth daily      hydrochlorothiazide (HYDRODIURIL) 25 MG tablet Take 1 tablet by mouth daily      lovastatin (MEVACOR) 40 MG tablet Take 40 mg by mouth daily      tamsulosin (FLOMAX) 0.4 MG capsule Take 0.4 mg by mouth daily      aspirin 81 MG chewable tablet Take 1 mg by mouth daily      Specialty Vitamins Products (PROSTATE) TABS Take 2 daily      Multiple Vitamin (ONE-A-DAY MENS) TABS Take 1 tablet by mouth daily      NONFORMULARY Nuretin: takes 2 daily      Multiple Vitamins-Minerals (ICAPS AREDS 2) CAPS Take by mouth 2 daily      [DISCONTINUED] amLODIPine (NORVASC) 10 MG tablet Take 1 tablet by mouth daily 90 tablet 1     No current facility-administered medications on file prior to visit. ALLERGIES:   Allergies as of 12/01/2021 - Fully Reviewed 12/01/2021   Allergen Reaction Noted    Amoxicillin Rash 04/17/2019      OBJECTIVE:   height is 5' 7\" (1.702 m) and weight is 232 lb (105.2 kg). His blood pressure is 140/76 (abnormal) and his pulse is 60. His oxygen saturation is 95%. PHYSICAL EXAM:    CONSTITUTIONAL: He is a 68y.o.-year-old who appears his stated age. He is alert and oriented x 3 and in no acute distress. HEENT: PERRL. No scleral icterus. No thrush, atraumatic, normocephalic. NECK: Supple, without cervical or supraclavicular lymphadenopathy:  CARDIOVASCULAR: S1 S2 RRR. Without murmer  RESPIRATORY & CHEST: Lungs are clear to auscultation and percussion. No wheezing, no crackles. Good air movement  GASTROINTESTINAL & ABDOMEN: Soft, nontender, positive bowel sounds in all quadrants, non-distended, without hepatosplenomegaly. GENITOURINARY: Deferred. MUSCULOSKELETAL: No tenderness to palpation of the axial skeleton. There is no clubbing. No cyanosis. No edema of the lower extremities. SKIN OF BODY: No rash or jaundice. PSYCHIATRIC EVALUATION: Normal affect. Patient answers questions appropriately. HEMATOLOGIC/LYMPHATIC/ IMMUNOLOGIC: No palpable lymphadenopathy. NEUROLOGIC: Alert and oriented x 3. Groslly non-focal. Motor strength is 5+/5 in all muscle groups. The patient has a normal sensorium globally. Labs:    No flowsheet data found.     All labs were personally reviewed by me any my interpretation is: CBC is mild anemia. Chem 8 is normal.     IMAGING:    No new chest imaging for me to evaluate. Pulmonary Functions Testing Results:    Complete PFT done on 5/26/2021  FEV1: 2.46/25  FVC: 3.09/86  FEV1/FVC: 79  Total lung capacity: 111%  Residual volume: 115%  Diffusion capacity: 149%  Airway resistance: 124%    This complete PFT was personally reviewed by me and my interpretation is: Spirometry values are within normal limits. Normal total lung capacity and residual volume with increased diffusion capacity. Airway resistance is increased. Impression: Possibly very mild obstructive airway disease. CPAP compliance summary     8/31/2021-11/28/2021 3/11/2021-6/8/2021   Days with device usage  90/90 days  88/90 days   Average Usage  7 hours 26 minutes  7 hours 24 minutes   Days with device usage >4hrs  100%  98%   Large Leak per night  10.9 L/min  12.1 L/min   AHI  1.5  1.6   CPAP settings  7 cmH2O  7 cmH2O   90th percentile pressure - -   Mask  full facemask  fullface mask     DME: Medical service company      IMPRESSION AND RECOMMENDATIONS:     1. Shortness of breath  -Patient has shortness of breath most likely due to a combination of factors including paroxysmal atrial fibrillation, morbid obesity and sleep apnea. Cannot completely rule out an obstructive airway disease. Patient has been a non-smoker and no occupational exposure to dust, smoke or fumes.  -Complete PFT does not show any obstructive airway disease. No indications for long-acting beta-2 agonist or long-acting muscarinic agents.    -He can continue using albuterol as needed. .    2. DANO on CPAP  -Patient remains compliant with his CPAP therapy and denies any mask leakage or pressure intolerance.   -No changes made to the pressure settings today.   -Advised patient to change his mask interface regularly to maintain proper seal.    3. Paroxysmal atrial fibrillation ([address]  -As per cardiology services.  -Patient is on metoprolol 50 mg twice a day. -He will benefit with continued diuretic usage. Currently on hydrochlorothiazide. If starts to have more pedal edema, may need to be switched over to furosemide. 4. Class 2 severe obesity due to excess calories with serious comorbidity and body mass index (BMI) of 37.0 to 37.9 in adult [facility])  -I strongly advised the patient to make efforts to lose weight. I discussed various modalities including moderate intensity intermittent exercises, diet control and bariatric surgery. If the patient loses even 10 to 15% of current body weight, it will be beneficial in improving the overall health. I spent 15 minutes in counseling the patient regarding medical nutrition therapy. Return in about 6 months (around 6/1/2022) for bryan. Yuniel Frey MD  Pulmonary Critical Care and Sleep Medicine  12/1/2021, 10:15 AM    This note was completed using dragon medical speech recognition software. Grammatical errors, random word insertions, pronoun errors and incomplete sentences are occasional consequences of this technology due to software limitations. If there are questions or concerns about the content of this note of information contained within the body of this dictation they should be addressed with the provider for clarification.

## 2021-12-25 PROCEDURE — 93298 REM INTERROG DEV EVAL SCRMS: CPT | Performed by: INTERNAL MEDICINE

## 2021-12-25 PROCEDURE — G2066 INTER DEVC REMOTE 30D: HCPCS | Performed by: INTERNAL MEDICINE

## 2021-12-28 ENCOUNTER — NURSE ONLY (OUTPATIENT)
Dept: CARDIOLOGY CLINIC | Age: 76
End: 2021-12-28
Payer: MEDICARE

## 2021-12-28 DIAGNOSIS — I48.0 PAF (PAROXYSMAL ATRIAL FIBRILLATION) (HCC): ICD-10-CM

## 2021-12-28 DIAGNOSIS — Z45.09 ENCOUNTER FOR ELECTRONIC ANALYSIS OF REVEAL EVENT RECORDER: ICD-10-CM

## 2022-02-01 ENCOUNTER — NURSE ONLY (OUTPATIENT)
Dept: CARDIOLOGY CLINIC | Age: 77
End: 2022-02-01
Payer: MEDICARE

## 2022-02-01 DIAGNOSIS — Z45.09 ENCOUNTER FOR ELECTRONIC ANALYSIS OF REVEAL EVENT RECORDER: ICD-10-CM

## 2022-02-01 DIAGNOSIS — I48.0 PAF (PAROXYSMAL ATRIAL FIBRILLATION) (HCC): ICD-10-CM

## 2022-02-01 PROCEDURE — 93298 REM INTERROG DEV EVAL SCRMS: CPT | Performed by: INTERNAL MEDICINE

## 2022-02-01 PROCEDURE — G2066 INTER DEVC REMOTE 30D: HCPCS | Performed by: INTERNAL MEDICINE

## 2022-02-11 RX ORDER — AMLODIPINE BESYLATE 10 MG/1
TABLET ORAL
Qty: 90 TABLET | Refills: 0 | Status: SHIPPED | OUTPATIENT
Start: 2022-02-11 | End: 2022-05-02

## 2022-02-11 NOTE — TELEPHONE ENCOUNTER
Received refill request for Amlodipine from [address]     Last ov:09/15/2021 NPSR    Last EK/15/2021     Last Refill:2021 #90 tabs w/ 1 refill    Next appointment:2022 [facility]

## 2022-03-08 ENCOUNTER — NURSE ONLY (OUTPATIENT)
Dept: CARDIOLOGY CLINIC | Age: 77
End: 2022-03-08
Payer: MEDICARE

## 2022-03-08 DIAGNOSIS — I48.0 PAF (PAROXYSMAL ATRIAL FIBRILLATION) (HCC): ICD-10-CM

## 2022-03-08 DIAGNOSIS — Z45.09 ENCOUNTER FOR ELECTRONIC ANALYSIS OF REVEAL EVENT RECORDER: ICD-10-CM

## 2022-03-09 PROCEDURE — G2066 INTER DEVC REMOTE 30D: HCPCS | Performed by: INTERNAL MEDICINE

## 2022-03-09 PROCEDURE — 93298 REM INTERROG DEV EVAL SCRMS: CPT | Performed by: INTERNAL MEDICINE

## 2022-03-16 NOTE — PROGRESS NOTES
[facility]   Electrophysiology Follow up   Date: 3/17/2022  I had the privilege of visiting Basil Short in the office. CC: PAF  HPI: Basil Short is a 68 y.o. male with a  history includes hypertension, hyperlipidemia, diabetes, and sleep apnea. He is  treated for sleep apnea with C-pap. He was seen at [facility] ER 2/23/19 with heart racing and elevated blood pressure and shortness of breath . He was in atrial fibrillation and placed on Metoprolol and Xarelto. He then saw his pcp who changed Xarelto to Coumadin due to cost    4/29/19:  ILR Implanted  5/31/19   DCCV  07/27/2020 DCCV  10/05/2020 RFA  For atrial fibrillation with PVI with ablation of complex atrial fractionated electrogram and focal source of atrial fibrillation    Tex Urena presents to the office in follow up. He is doing well today from a cardiac standpoint. He has not had recurrence of atrial fibrillation. His blood pressure is elevated today in office and states it is normally around 140/70 at home. We will increase is losartan to 100 mg daily. Patient denies lightheadedness, dizziness, chest pain, palpitations, orthopnea, edema, presyncope or syncope. Assessment and plan:   PAF   -ECG today shows Sinus Bradycardia    -0% AT/AF burden per device interrogation, discussed options once ILR battery is depleted. Will replace his device once depleted as he wishes to take eliquis PRN    -No recurrence of atrial fibrillation    -Patient has a [id number] Score of at least 4  (age1, HTN, DM), He is on [facility] Eliquis as needed for episodes of afib, Discussed the need for [facility], but he is aware of the risks    -S/p DCCV 5/31/2019, 7/27/2020   -10/05/2020 RFA  For atrial fibrillation with PVI with ablation of complex atrial fractionated electrogram and focal source of atrial fibrillation   -Continue metoprolol 25 mg BID for rate control        He has not been taking anticoagulation due to bruising.   Therefore I will continue to monitor him with implantable loop monitor and event of battery reaches DAMEON but will replace it. If he has recurrence of A. fib, he will need to be anticoagulated and that case we can consider watchman if bruising is prohibitive. HTN  -Not well controlled: 150/80  -increase losartan to 100 mg daily  -On amlodipine 10 mg daily, metoprolol 25 mg BUD, losartan 50 mg daily and HCTZ 25 mg daily   -BP goal <130/80  -Home BP monitoring encouraged, printed information provided on how to accurately measure BP at home.  -Counseled to follow a low salt diet to assure blood pressure remains controlled for cardiovascular risk factor modification.   -The patient is counseled to get regular exercise 3-5 times per week and maintain a healthy weight reduce cardiovascular risk factors. Obesity  Body mass index is 35.8 kg/m². -Excessive weight is complicating assessment and treatment.  It is placing patient at risk for multiple co-morbidities as well as early death and contributing to the patient's presentation.  -Discussed weight management with diet and exercise      DANO  -Stable: Uses CPAP/Bipap/APAP  -Encourage to use machine to prevent long term effects of untreated DANO      Plan:   -Increase losartan to 100 mg daily  -My use stimulator at PT, should not effect his device  -Will replace ILR once depleted as he is taking eliquis as needed   -Follow up NPSR in 6 months       Patient Active Problem List    Diagnosis Date Noted    Status post placement of implantable loop recorder 10/05/2020    PAF (paroxysmal atrial fibrillation) ([facility] [address] 10/05/2020    Encounter for electronic analysis of reveal event recorder 05/08/2019    Essential hypertension 04/17/2019    Other hyperlipidemia 04/17/2019    Type 2 diabetes mellitus without complication, without long-term current use of insulin ([facility] [address] 04/17/2019    Shortness of breath 04/17/2019     Diagnostic studies:   ECG 3/17/22  SB   405 QTcH, 98 QRS    LISSA 10/7/2020   Summary   Overall left ventricular function is normal.   Mitral valve is structurally normal.   Trivial mitral regurgitation is present. There is no evidence of mass or thrombus in the left atrium or appendage. Tricuspid aortic valve. No evidence of aortic valve regurgitation. Stress Test 4/24/2019   Summary    Normal LV size with hyperdynamic systolic function.    Left ventricular ejection fraction of 73 %.    There is normal isotope uptake at stress and rest.    There is no evidence of myocardial ischemia or scar.             I independently reviewed the cardiac diagnostic studies, ECG and relevant imaging studies. Lab Results   Component Value Date    LVEF 73 04/24/2019     No results found for: TSHFT4, TSH      Physical Examination:  Vitals:    03/17/22 1008   BP: (!) 150/80   Pulse: 60   SpO2: 98%      Wt Readings from Last 3 Encounters:   03/17/22 228 lb 9.6 oz (103.7 kg)   12/01/21 232 lb (105.2 kg)   09/15/21 228 lb (103.4 kg)       · Constitutional: Oriented. No distress. · Head: Normocephalic and atraumatic. · Mouth/Throat: Oropharynx is clear and moist.   · Eyes: Conjunctivae normal. EOM are normal.   · Neck: Neck supple. No rigidity. No JVD present. · Cardiovascular: Normal rate, regular rhythm, S1&S2. · Pulmonary/Chest: Bilateral respiratory sounds. No wheezes, No rhonchi. · Abdominal: Soft. Bowel sounds present. No distension, No tenderness. · Musculoskeletal: No tenderness. No edema    · Lymphadenopathy: Has no cervical adenopathy. · Neurological: Alert and oriented. Cranial nerve appears intact, No Gross deficit   · Skin: Skin is warm and dry. No rash noted. · Psychiatric: Has a normal behavior       Review of System:  [x] Full ROS obtained and negative except as mentioned in HPI    Prior to Admission medications    Medication Sig Start Date End Date Taking?  Authorizing Provider   amLODIPine (NORVASC) 10 MG tablet Take 1 tablet by mouth once daily 2/11/22  Yes DANNIE Méndez - CNP metoprolol tartrate (LOPRESSOR) 50 MG tablet Take 0.5 tablets by mouth 2 times daily 1/8/21  Yes DANNIE Banegas CNP   fluticasone (FLONASE) 50 MCG/ACT nasal spray 1 spray by Each Nostril route daily   Yes Historical Provider, MD   allopurinol (ZYLOPRIM) 300 MG tablet Take 1 tablet by mouth daily   Yes Historical Provider, MD   metFORMIN (GLUCOPHAGE-XR) 500 MG extended release tablet Take 1 tablet by mouth daily   Yes Historical Provider, MD   losartan (COZAAR) 50 MG tablet Take 1 tablet by mouth daily   Yes Historical Provider, MD   hydrochlorothiazide (HYDRODIURIL) 25 MG tablet Take 1 tablet by mouth daily   Yes Historical Provider, MD   lovastatin (MEVACOR) 40 MG tablet Take 40 mg by mouth daily 11/27/18  Yes Historical Provider, MD   tamsulosin (FLOMAX) 0.4 MG capsule Take 0.4 mg by mouth daily 11/27/18  Yes Historical Provider, MD   aspirin 81 MG chewable tablet Take 1 mg by mouth daily   Yes Historical Provider, MD   Specialty Vitamins Products (PROSTATE) TABS Take 2 daily   Yes Historical Provider, MD   Multiple Vitamin (ONE-A-DAY MENS) TABS Take 1 tablet by mouth daily   Yes Historical Provider, MD   NONFORMULARY Nuretin: takes 2 daily   Yes Historical Provider, MD   Multiple Vitamins-Minerals (ICAPS AREDS 2) CAPS Take by mouth 2 daily   Yes Historical Provider, MD   amLODIPine (NORVASC) 10 MG tablet Take 1 tablet by mouth daily 11/18/20   DANNIE Banegas CNP       Allergies   Allergen Reactions    Amoxicillin Rash       Social History:  Reviewed. reports that he has never smoked. He has never used smokeless tobacco. He reports current alcohol use of about 3.0 standard drinks of alcohol per week. He reports that he does not use drugs. Family History:  Reviewed. Reviewed. No family history of SCD. Relevant and available labs, and cardiovascular diagnostics reviewed. Reviewed.      I independently reviewed relevant and available cardiac diagnostic tests ECG, CXR, Echo, Stress test, Device interrogation, Holter, CT scan. Outside medical records via Care everywhere reviewed and summarized in H&P above. Complex medical condition with multiple medical problems affecting prognosis and outcome of EP interventions       - The patient is counseled to follow a low salt diet to assure blood pressure remains controlled for cardiovascular risk factor modification.   - The patient is counseled to avoid excess caffeine, and energy drinks as this may exacerbated ectopy and arrhythmia. - The patient is counseled to get regular exercise 3-5 times per week to control cardiovascular risk factors. - The patient is counseled to lose weigt to control cardiovascular risk factors. All questions and concerns were addressed to the patient/family. Alternatives to my treatment were discussed. I have discussed the above stated plan and the patient verbalized understanding and agreed with the plan. Scribe attestation: This note was scribed in the presence of Tino Mcmahon MD by Yudith Bradley RN    I, Dr. Tino Mcmahon personally performed the services described in this documentation as scribed by RN in my presence, and it is both accurate and complete.        NOTE: This report was transcribed using voice recognition software. Every effort was made to ensure accuracy, however, inadvertent computerized transcription errors may be present.      Tino Mcmahon MD, Dat Pope [address] [facility]   Office: [phone]  Fax: [phone]

## 2022-03-17 ENCOUNTER — NURSE ONLY (OUTPATIENT)
Dept: CARDIOLOGY CLINIC | Age: 77
End: 2022-03-17

## 2022-03-17 ENCOUNTER — OFFICE VISIT (OUTPATIENT)
Dept: CARDIOLOGY CLINIC | Age: 77
End: 2022-03-17
Payer: MEDICARE

## 2022-03-17 VITALS
OXYGEN SATURATION: 98 % | HEART RATE: 60 BPM | DIASTOLIC BLOOD PRESSURE: 80 MMHG | HEIGHT: 67 IN | BODY MASS INDEX: 35.88 KG/M2 | WEIGHT: 228.6 LBS | SYSTOLIC BLOOD PRESSURE: 150 MMHG

## 2022-03-17 DIAGNOSIS — I10 ESSENTIAL HYPERTENSION: ICD-10-CM

## 2022-03-17 DIAGNOSIS — I48.0 PAF (PAROXYSMAL ATRIAL FIBRILLATION) (HCC): Primary | ICD-10-CM

## 2022-03-17 DIAGNOSIS — E66.9 OBESITY (BMI 30-39.9): ICD-10-CM

## 2022-03-17 DIAGNOSIS — G47.33 OSA (OBSTRUCTIVE SLEEP APNEA): ICD-10-CM

## 2022-03-17 DIAGNOSIS — Z45.09 ENCOUNTER FOR ELECTRONIC ANALYSIS OF REVEAL EVENT RECORDER: ICD-10-CM

## 2022-03-17 PROCEDURE — 1036F TOBACCO NON-USER: CPT | Performed by: INTERNAL MEDICINE

## 2022-03-17 PROCEDURE — 1123F ACP DISCUSS/DSCN MKR DOCD: CPT | Performed by: INTERNAL MEDICINE

## 2022-03-17 PROCEDURE — G8484 FLU IMMUNIZE NO ADMIN: HCPCS | Performed by: INTERNAL MEDICINE

## 2022-03-17 PROCEDURE — 99214 OFFICE O/P EST MOD 30 MIN: CPT | Performed by: INTERNAL MEDICINE

## 2022-03-17 PROCEDURE — G8417 CALC BMI ABV UP PARAM F/U: HCPCS | Performed by: INTERNAL MEDICINE

## 2022-03-17 PROCEDURE — 4040F PNEUMOC VAC/ADMIN/RCVD: CPT | Performed by: INTERNAL MEDICINE

## 2022-03-17 PROCEDURE — G8427 DOCREV CUR MEDS BY ELIG CLIN: HCPCS | Performed by: INTERNAL MEDICINE

## 2022-03-17 RX ORDER — LOSARTAN POTASSIUM 100 MG/1
100 TABLET ORAL DAILY
Qty: 90 TABLET | Refills: 3 | Status: SHIPPED | OUTPATIENT
Start: 2022-03-17

## 2022-03-17 NOTE — PROGRESS NOTES
Patient comes in for interrogation of their implanted loop recorder. ILR implanted for AF  Interrogation shows  13 karyn episodes all during sleep hours on 9/21/21. Min V rate of 39 bpm.  Patient remains on metoprolol. Please see interrogation for more detail. Patient will see Dr. Mellissa Freeman today and we will continue to follow the Patient remotely.

## 2022-04-08 ENCOUNTER — NURSE ONLY (OUTPATIENT)
Dept: CARDIOLOGY CLINIC | Age: 77
End: 2022-04-08
Payer: MEDICARE

## 2022-04-08 DIAGNOSIS — Z45.09 ENCOUNTER FOR ELECTRONIC ANALYSIS OF REVEAL EVENT RECORDER: ICD-10-CM

## 2022-04-08 DIAGNOSIS — I48.0 PAF (PAROXYSMAL ATRIAL FIBRILLATION) (HCC): ICD-10-CM

## 2022-04-10 PROCEDURE — 93298 REM INTERROG DEV EVAL SCRMS: CPT | Performed by: INTERNAL MEDICINE

## 2022-04-10 PROCEDURE — G2066 INTER DEVC REMOTE 30D: HCPCS | Performed by: INTERNAL MEDICINE

## 2022-04-11 RX ORDER — METOPROLOL TARTRATE 50 MG/1
TABLET, FILM COATED ORAL
Qty: 27 TABLET | Refills: 0 | Status: SHIPPED | OUTPATIENT
Start: 2022-04-11 | End: 2022-05-02

## 2022-04-11 NOTE — TELEPHONE ENCOUNTER
Received refill request for Metoprolol from [facility] DR CITLALI GARIBAY.      Last OV: 03/17/2022 w/ MXA     Last Refill: 01/08/2021#180 w/ 1 refill     Next Appointment: 09/14/2022 w/ NPSR

## 2022-04-12 ENCOUNTER — NURSE ONLY (OUTPATIENT)
Dept: CARDIOLOGY CLINIC | Age: 77
End: 2022-04-12

## 2022-04-12 ENCOUNTER — TELEPHONE (OUTPATIENT)
Dept: CARDIOLOGY CLINIC | Age: 77
End: 2022-04-12

## 2022-04-12 DIAGNOSIS — Z45.09 ENCOUNTER FOR ELECTRONIC ANALYSIS OF REVEAL EVENT RECORDER: ICD-10-CM

## 2022-04-12 NOTE — PROGRESS NOTES
Shane Ford came in as he felt atrial fibrillation and his home monitoring is not working. Discussed that he should continue Eliquis for a few days and if he does not have recurrent episodes he can hold his eliquis again. He has extensive bruising with anticoagulation and watchman was briefly discussed at Last  OV 3/2022 and I provided him with a pamphlet today. I discussed that I will relay information to  so that he can determine the next steps.

## 2022-04-12 NOTE — TELEPHONE ENCOUNTER
Pt woke up this morning with afib after a few years without it (pt states ablation was in 10.2019) and wants to know next best course of action.  States he knows MXA and NPSR well    Cb [phone]

## 2022-04-12 NOTE — TELEPHONE ENCOUNTER
Spoke with the patient and he states that on his watch he went into AF at 8am for about two hours.  He states that he is now back in NSR with a HR of 62 bpm . He is unable to send transmission at this time as home monitor isnt transmitting properly

## 2022-04-12 NOTE — TELEPHONE ENCOUNTER
He can come in to the device clinic to have it checked and see how much afib he is having.  May schedule with just device clinic

## 2022-04-13 ENCOUNTER — TELEPHONE (OUTPATIENT)
Dept: CARDIOLOGY CLINIC | Age: 77
End: 2022-04-13

## 2022-04-13 NOTE — TELEPHONE ENCOUNTER
Please have patient come in to discuss watchman with [facility], I believe he has some hold spots 4/21/2022 in the afternoon.  Please see if he is willing to do official consultation then TY

## 2022-04-13 NOTE — TELEPHONE ENCOUNTER
Called spoke to the pt pt will be in on 4/28 at 2:30 pt declined the appt for 4/21 with [facility] to discuss watchman

## 2022-04-13 NOTE — TELEPHONE ENCOUNTER
Patient has an appointment for [facility] consult with Sandra Rush on 4/28/22 at 2:30 PM

## 2022-04-22 ENCOUNTER — TELEPHONE (OUTPATIENT)
Dept: CARDIOLOGY CLINIC | Age: 77
End: 2022-04-22

## 2022-04-22 NOTE — TELEPHONE ENCOUNTER
Pt called stating he was given samples of eliquis as needed,  pt stated his is taking pretty much everyday now, pt has appt 4/28 however pt will be put of the Eliquis and wants to know if it can be called into his pharmacy pt is taking 5mg AM and PM.      Pls advise thank you.       [address]   [address] [place] [address]   Phone:  [phone]  Fax:  [phone]

## 2022-04-27 PROBLEM — E66.812 CLASS 2 OBESITY IN ADULT: Status: ACTIVE | Noted: 2022-04-27

## 2022-04-27 PROBLEM — E66.9 CLASS 2 OBESITY IN ADULT: Status: ACTIVE | Noted: 2022-04-27

## 2022-04-27 NOTE — PROGRESS NOTES
[address]   Electrophysiology Follow up   Date: 4/28/2022  I had the privilege of visiting Cintia Parker in the office. CC: PAF  HPI: Cintia Parker is a 68 y.o. male with a  history includes hypertension, hyperlipidemia, diabetes, and sleep apnea. He is  treated for sleep apnea with C-pap. He was seen at [facility] ER 2/23/19 with heart racing and elevated blood pressure and shortness of breath . He was in atrial fibrillation and placed on Metoprolol and Xarelto. He then saw his pcp who changed Xarelto to Coumadin due to cost    4/29/19:  ILR Implanted  5/31/19   DCCV  07/17/2020 DCCV  10/05/2020 RFA  For atrial fibrillation with PVI with ablation of complex atrial fractionated electrogram and focal source of atrial fibrillation      Interval history: Deon Oseguera noted atrial fibrillation on his smart watch 04/12/2022 after no recurrence for over 2 years. This was verified with loop recorder. Eliquis was resumed. He would like to discuss Watchman. He has started training in the gym to lose weight. Assessment and plan:   PAF   - ECG today shows Sinus [address]    - S/p DCCV 5/31/2019, 7/27/2020    -10/05/2020 RFA  For atrial fibrillation with PVI with ablation of complex atrial fractionated electrogram and focal source of atrial fibrillation    -Continue metoprolol 25 mg BID for rate control      -Patient has a [id number] Score of at least 4  (age3, HTN, DM), He is on [facility] Eliquis as needed for episodes of afib, Discussed the need for [facility], but he is aware of the risks     ~ he had not been taking anticoagulation due to bruising. Has been resumed .     ~  atrial fibrillation was noted on his smart watch and verified on his loop recorder. 04/12/2022 . Eliquis  was resumed. - I discussed left atrial closure with watchman device. Alternatives including surgical ligation of CHICO with Atri clips also discussed.  The procedure has been discussed in detail with patient and family members along with the risk and benefits. Success rate and complications associated with such a procedure have been discussed. Continued treatment with anti-coagulation agents will also be a reasonable strategy and can be pursued. All the questions were answered. - he would like to stay on [facility] for now. He has concerns about cost. Giving Savings program cards. - we Discussed how he would transition to warfarin if he decides he wants to go that route. - If episodes increase in frequency or duration we can consider repeat ablation. Discussed in detail about the risk factors, pathophysiology, and treatment options including life style modifications for atrial fibrillation. · Eat heart healthy diet  · Minimize alcohol intake  · Minimize caffeine and soda   · Keep your blood pressure under control. · Keep your blood sugar under control. · Stop smoking  · Be active, keep your body weight optimal  · Exercise on a regular basis  · Manage your stress   · If you snore, get evaluated for sleep apnea  · Be aware of the symptoms of stroke        He has had 2 episodes of A. fib. We will see if he has more episodes and may consider another ablation. As far as anticoagulation watchman goes, he has had some bruising in the past but it does not seem to be very significant and therefore at this point I would recommend to continue with anticoagulation as long as he can tolerate it. When he is loop monitor reaches DAMEON we will replace it. We discussed lifestyle modification including weight loss and exercise which he is planning to pursue seriously. S/p ILR 04/29/2019     The CIED was interrogated and programmed and I supervised and reviewed all the data. All findings and changes are in device interrogation sheet and reflect my personal interpretation and changes and is scanned to Epic.    2 episodes atrial fibrillation on 04/18/2022 total of 8.5 hours. 1 Chet episode on 04/27/2022 lasting 6 seconds, min rate 41, during sleep time. regurgitation. Stress Test 4/24/2019   Summary    Normal LV size with hyperdynamic systolic function.    Left ventricular ejection fraction of 73 %.    There is normal isotope uptake at stress and rest.    There is no evidence of myocardial ischemia or scar.             I independently reviewed the cardiac diagnostic studies, ECG and relevant imaging studies. Lab Results   Component Value Date    LVEF 73 04/24/2019     No results found for: TSHFT4, TSH      Physical Examination:  Vitals:    04/28/22 1442   BP: 137/74   Pulse: 50   SpO2: 98%      Wt Readings from Last 3 Encounters:   04/28/22 225 lb 6.4 oz (102.2 kg)   03/17/22 228 lb 9.6 oz (103.7 kg)   12/01/21 232 lb (105.2 kg)       · Constitutional: Oriented. No distress. · Head: Normocephalic and atraumatic. · Mouth/Throat: Oropharynx is clear and moist.   · Eyes: Conjunctivae normal. EOM are normal.   · Neck: Neck supple. No rigidity. No JVD present. · Cardiovascular: Normal rate, regular rhythm, S1&S2. · Pulmonary/Chest: Bilateral respiratory sounds. No wheezes, No rhonchi. · Abdominal: Soft. Bowel sounds present. No distension, No tenderness. · Musculoskeletal: No tenderness. No edema    · Lymphadenopathy: Has no cervical adenopathy. · Neurological: Alert and oriented. Cranial nerve appears intact, No Gross deficit   · Skin: Skin is warm and dry. No rash noted. · Psychiatric: Has a normal behavior       Review of System:  [x] Full ROS obtained and negative except as mentioned in HPI    Prior to Admission medications    Medication Sig Start Date End Date Taking?  Authorizing Provider   apixaban (ELIQUIS) 5 MG TABS tablet Take 1 tablet by mouth 2 times daily 4/22/22  Yes Mai Mcdonald MD   metoprolol tartrate (LOPRESSOR) 50 MG tablet Take 1/2 (one-half) tablet by mouth twice daily 4/11/22  Yes DANNIE Salomon CNP   losartan (COZAAR) 100 MG tablet Take 1 tablet by mouth daily 3/17/22  Yes Mai Mcdonald MD   amLODIPine (Colleen Alayna) 10 MG tablet Take 1 tablet by mouth once daily 2/11/22  Yes DANNIE Jones CNP   fluticasone (FLONASE) 50 MCG/ACT nasal spray 1 spray by Each Nostril route daily   Yes Historical Provider, MD   allopurinol (ZYLOPRIM) 300 MG tablet Take 1 tablet by mouth daily   Yes Historical Provider, MD   metFORMIN (GLUCOPHAGE-XR) 500 MG extended release tablet Take 1 tablet by mouth daily   Yes Historical Provider, MD   hydrochlorothiazide (HYDRODIURIL) 25 MG tablet Take 1 tablet by mouth daily   Yes Historical Provider, MD   lovastatin (MEVACOR) 40 MG tablet Take 40 mg by mouth daily 11/27/18  Yes Historical Provider, MD   tamsulosin (FLOMAX) 0.4 MG capsule Take 0.4 mg by mouth daily 11/27/18  Yes Historical Provider, MD   aspirin 81 MG chewable tablet Take 1 mg by mouth daily   Yes Historical Provider, MD   Specialty Vitamins Products (PROSTATE) TABS Take 2 daily   Yes Historical Provider, MD   Multiple Vitamin (ONE-A-DAY MENS) TABS Take 1 tablet by mouth daily   Yes Historical Provider, MD   NONFORMULARY Nuretin: takes 2 daily   Yes Historical Provider, MD   Multiple Vitamins-Minerals (ICAPS AREDS 2) CAPS Take by mouth 2 daily   Yes Historical Provider, MD   amLODIPine (NORVASC) 10 MG tablet Take 1 tablet by mouth daily 11/18/20   DANNIE Bear CNP       Allergies   Allergen Reactions    Amoxicillin Rash       Social History:  Reviewed. reports that he has never smoked. He has never used smokeless tobacco. He reports current alcohol use of about 3.0 standard drinks of alcohol per week. He reports that he does not use drugs. Family History:  Reviewed. Reviewed. No family history of SCD. Relevant and available labs, and cardiovascular diagnostics reviewed. Reviewed. I independently reviewed relevant and available cardiac diagnostic tests ECG, CXR, Echo, Stress test, Device interrogation, Holter, CT scan. Outside medical records via Care everywhere reviewed and summarized in H&P above. Complex medical condition with multiple medical problems affecting prognosis and outcome of EP interventions       - The patient is counseled to follow a low salt diet to assure blood pressure remains controlled for cardiovascular risk factor modification.   - The patient is counseled to avoid excess caffeine, and energy drinks as this may exacerbated ectopy and arrhythmia. - The patient is counseled to get regular exercise 3-5 times per week to control cardiovascular risk factors. - The patient is counseled to lose weigt to control cardiovascular risk factors. All questions and concerns were addressed to the patient/family. Alternatives to my treatment were discussed. I have discussed the above stated plan and the patient verbalized understanding and agreed with the plan. Scribe attestation: This note was scribed in the presence of  Mai Mcdonald MD by Jese Abraham RN    I, Dr. Mai Mcdonald personally performed the services described in this documentation as scribed by RN in my presence, and it is both accurate and complete.           NOTE: This report was transcribed using voice recognition software. Every effort was made to ensure accuracy, however, inadvertent computerized transcription errors may be present.      Mai Mcdonald MD, Hermilo Echeverria [address] [place] [address]   Office: [phone]  Fax: [phone]

## 2022-04-28 ENCOUNTER — OFFICE VISIT (OUTPATIENT)
Dept: CARDIOLOGY CLINIC | Age: 77
End: 2022-04-28
Payer: MEDICARE

## 2022-04-28 ENCOUNTER — NURSE ONLY (OUTPATIENT)
Dept: CARDIOLOGY CLINIC | Age: 77
End: 2022-04-28

## 2022-04-28 VITALS
OXYGEN SATURATION: 98 % | HEART RATE: 50 BPM | BODY MASS INDEX: 35.38 KG/M2 | SYSTOLIC BLOOD PRESSURE: 137 MMHG | WEIGHT: 225.4 LBS | DIASTOLIC BLOOD PRESSURE: 74 MMHG | HEIGHT: 67 IN

## 2022-04-28 DIAGNOSIS — Z45.09 ENCOUNTER FOR ELECTRONIC ANALYSIS OF REVEAL EVENT RECORDER: ICD-10-CM

## 2022-04-28 DIAGNOSIS — G47.33 OSA (OBSTRUCTIVE SLEEP APNEA): ICD-10-CM

## 2022-04-28 DIAGNOSIS — I48.0 PAF (PAROXYSMAL ATRIAL FIBRILLATION) (HCC): Primary | ICD-10-CM

## 2022-04-28 DIAGNOSIS — E66.01 CLASS 2 SEVERE OBESITY DUE TO EXCESS CALORIES WITH SERIOUS COMORBIDITY AND BODY MASS INDEX (BMI) OF 35.0 TO 35.9 IN ADULT (HCC): ICD-10-CM

## 2022-04-28 DIAGNOSIS — I10 ESSENTIAL HYPERTENSION: ICD-10-CM

## 2022-04-28 PROCEDURE — 93000 ELECTROCARDIOGRAM COMPLETE: CPT | Performed by: INTERNAL MEDICINE

## 2022-04-28 PROCEDURE — G8427 DOCREV CUR MEDS BY ELIG CLIN: HCPCS | Performed by: INTERNAL MEDICINE

## 2022-04-28 PROCEDURE — G8417 CALC BMI ABV UP PARAM F/U: HCPCS | Performed by: INTERNAL MEDICINE

## 2022-04-28 PROCEDURE — 1123F ACP DISCUSS/DSCN MKR DOCD: CPT | Performed by: INTERNAL MEDICINE

## 2022-04-28 PROCEDURE — 4040F PNEUMOC VAC/ADMIN/RCVD: CPT | Performed by: INTERNAL MEDICINE

## 2022-04-28 PROCEDURE — 1036F TOBACCO NON-USER: CPT | Performed by: INTERNAL MEDICINE

## 2022-04-28 PROCEDURE — 99215 OFFICE O/P EST HI 40 MIN: CPT | Performed by: INTERNAL MEDICINE

## 2022-04-28 NOTE — PATIENT INSTRUCTIONS
· Eat heart healthy diet  · Minimize alcohol intake  · Minimize caffeine and soda   · Keep your blood pressure under control. · Keep your blood sugar under control.    · Stop smoking  · Be active, keep your body weight optimal  · Exercise on a regular basis  · Manage your stress   · If you snore, get evaluated for sleep apnea  · Be aware of the symptoms of stroke

## 2022-05-02 RX ORDER — METOPROLOL TARTRATE 50 MG/1
TABLET, FILM COATED ORAL
Qty: 90 TABLET | Refills: 3 | Status: SHIPPED | OUTPATIENT
Start: 2022-05-02 | End: 2022-09-14 | Stop reason: SDUPTHER

## 2022-05-02 RX ORDER — AMLODIPINE BESYLATE 10 MG/1
TABLET ORAL
Qty: 90 TABLET | Refills: 3 | Status: SHIPPED | OUTPATIENT
Start: 2022-05-02

## 2022-05-02 NOTE — TELEPHONE ENCOUNTER
Received refill request for Amlodipine 10mg from [address] [facility] : 04/28/2022 with MXA    Last EKG : 04/28/2022    Last Refill : 02/11/2022 90 w/0 refills    Next OV : 09/14/2022 with NPSR

## 2022-05-17 ENCOUNTER — NURSE ONLY (OUTPATIENT)
Dept: CARDIOLOGY CLINIC | Age: 77
End: 2022-05-17
Payer: MEDICARE

## 2022-05-17 DIAGNOSIS — I48.0 PAF (PAROXYSMAL ATRIAL FIBRILLATION) (HCC): ICD-10-CM

## 2022-05-17 DIAGNOSIS — Z45.09 ENCOUNTER FOR ELECTRONIC ANALYSIS OF REVEAL EVENT RECORDER: ICD-10-CM

## 2022-05-18 PROCEDURE — G2066 INTER DEVC REMOTE 30D: HCPCS | Performed by: INTERNAL MEDICINE

## 2022-05-18 PROCEDURE — 93298 REM INTERROG DEV EVAL SCRMS: CPT | Performed by: INTERNAL MEDICINE

## 2022-05-18 NOTE — PROGRESS NOTES
We received a remote transmission from patient's monitor at home. Remote Linq report shows nocturnal karyn recordings and AF. Pt is on Eliquis. EP physician to review. We will continue to monitor remotely.

## 2022-06-08 ENCOUNTER — OFFICE VISIT (OUTPATIENT)
Dept: PULMONOLOGY | Age: 77
End: 2022-06-08
Payer: MEDICARE

## 2022-06-08 VITALS
OXYGEN SATURATION: 98 % | SYSTOLIC BLOOD PRESSURE: 134 MMHG | BODY MASS INDEX: 35.31 KG/M2 | DIASTOLIC BLOOD PRESSURE: 68 MMHG | WEIGHT: 225 LBS | HEIGHT: 67 IN | HEART RATE: 69 BPM

## 2022-06-08 DIAGNOSIS — I48.0 PAF (PAROXYSMAL ATRIAL FIBRILLATION) (HCC): ICD-10-CM

## 2022-06-08 DIAGNOSIS — Z99.89 OSA ON CPAP: Primary | ICD-10-CM

## 2022-06-08 DIAGNOSIS — G47.33 OSA ON CPAP: Primary | ICD-10-CM

## 2022-06-08 DIAGNOSIS — E66.01 CLASS 2 SEVERE OBESITY DUE TO EXCESS CALORIES WITH SERIOUS COMORBIDITY AND BODY MASS INDEX (BMI) OF 35.0 TO 35.9 IN ADULT (HCC): ICD-10-CM

## 2022-06-08 PROCEDURE — 1123F ACP DISCUSS/DSCN MKR DOCD: CPT | Performed by: INTERNAL MEDICINE

## 2022-06-08 PROCEDURE — G8427 DOCREV CUR MEDS BY ELIG CLIN: HCPCS | Performed by: INTERNAL MEDICINE

## 2022-06-08 PROCEDURE — 1036F TOBACCO NON-USER: CPT | Performed by: INTERNAL MEDICINE

## 2022-06-08 PROCEDURE — G8417 CALC BMI ABV UP PARAM F/U: HCPCS | Performed by: INTERNAL MEDICINE

## 2022-06-08 PROCEDURE — 99214 OFFICE O/P EST MOD 30 MIN: CPT | Performed by: INTERNAL MEDICINE

## 2022-06-08 ASSESSMENT — SLEEP AND FATIGUE QUESTIONNAIRES
HOW LIKELY ARE YOU TO NOD OFF OR FALL ASLEEP WHILE SITTING INACTIVE IN A PUBLIC PLACE: 0
HOW LIKELY ARE YOU TO NOD OFF OR FALL ASLEEP WHILE WATCHING TV: 0
HOW LIKELY ARE YOU TO NOD OFF OR FALL ASLEEP WHILE LYING DOWN TO REST IN THE AFTERNOON WHEN CIRCUMSTANCES PERMIT: 1
ESS TOTAL SCORE: 1
HOW LIKELY ARE YOU TO NOD OFF OR FALL ASLEEP WHILE SITTING AND TALKING TO SOMEONE: 0
HOW LIKELY ARE YOU TO NOD OFF OR FALL ASLEEP WHILE SITTING QUIETLY AFTER LUNCH WITHOUT ALCOHOL: 0
HOW LIKELY ARE YOU TO NOD OFF OR FALL ASLEEP IN A CAR, WHILE STOPPED FOR A FEW MINUTES IN TRAFFIC: 0
HOW LIKELY ARE YOU TO NOD OFF OR FALL ASLEEP WHEN YOU ARE A PASSENGER IN A CAR FOR AN HOUR WITHOUT A BREAK: 0
HOW LIKELY ARE YOU TO NOD OFF OR FALL ASLEEP WHILE SITTING AND READING: 0

## 2022-06-08 NOTE — PROGRESS NOTES
PULMONARY OFFICE FOLLOW UP NOTE    REASON FOR VISIT:   Chief Complaint   Patient presents with    Sleep Apnea       DATE OF VISIT: 6/8/2022     HISTORY OF PRESENT ILLNESS: 68y.o. year old male comes into the pulmonary office for a follow-up. Patient has been using his CPAP therapy regularly and denies any new issues. No mask leakage or pressure intolerance. Has been able to lose some weight and has been going to gymnasium regularly. Scheduled to undergo replacement of his loop recorder in the near future. Had only one episode of atrial fibrillation few months ago. Denies any shortness of breath with exertion. Fairly active. No nasal symptoms or cough. Previously: Patient reports that his breathing has been stable and he denies any new symptoms. His atrial fibrillation is better controlled now. Has been using his CPAP therapy regularly and feels benefited from it. No mask leakage or pressure intolerance. Weight has been stable. Patient is going to a dietitian. Patient was recently admitted to the hospital with atrial fibrillation and required elective cardiac ablation on October 5, 2020. Post ablation patient was seen to have respiratory distress and chest imaging showed right upper lobe infiltrate. Patient was treated as community-acquired pneumonia. Also seen to have hypoxic respiratory failure requiring oxygen supplementation for a few days. At the time of discharge patient did not require any oxygen supplementation. Today comes in for a follow-up reporting that his breathing is much improved. He does have occasional cough which gets exacerbated when he is talking for extended period of time. His exercise tolerance is slowly improving. His heart rate fluctuates between high 40s to high 70s. Even when he is exercising strenuously it does not go above 70s. Patient is on metoprolol 50 mg twice a day. Is currently on hydrochlorothiazide. Does have mild pedal edema.   Has been using his CPAP machine regularly. Denies any mask leakage or pressure intolerance. Sleep Medicine 6/8/2022 12/1/2021   Sitting and reading 0 0   Watching TV 0 2   Sitting, inactive in a public place (e.g. a theatre or a meeting) 0 0   As a passenger in a car for an hour without a break 0 0   Lying down to rest in the afternoon when circumstances permit 1 2   Sitting and talking to someone 0 0   Sitting quietly after a lunch without alcohol 0 0   In a car, while stopped for a few minutes in traffic 0 0   Total score 1 4         REVIEW OF SYSTEMS: 14 point ROS is negative beside mentioned in [address] PAST MEDICAL HISTORY:   Past Medical History:   Diagnosis Date    [facility] [facility]     Clotting disorder ([facility] [facility]     Diabetes mellitus ([facility] [address]     Hyperlipidemia     Hypertension        PAST SURGICAL HISTORY: No past surgical history on file. SOCIAL HISTORY:   Social History     Tobacco Use    Smoking status: Never Smoker    Smokeless tobacco: Never Used   Vaping Use    Vaping Use: Never used   Substance Use Topics    Alcohol use:  Yes     Alcohol/week: 3.0 standard drinks     Types: 3 Cans of beer per week    Drug use: Never       FAMILY HISTORY:   Family History   Problem Relation Age of Onset    Arrhythmia Mother     High Cholesterol Father     Hypertension Father     Heart Attack Maternal Uncle        MEDICATIONS:     Current Outpatient Medications on File Prior to Visit   Medication Sig Dispense Refill    amLODIPine (NORVASC) 10 MG tablet Take 1 tablet by mouth once daily 90 tablet 3    metoprolol tartrate (LOPRESSOR) 50 MG tablet Take 1/2 (one-half) tablet by mouth twice daily 90 tablet 3    apixaban (ELIQUIS) 5 MG TABS tablet Take 1 tablet by mouth 2 times daily 60 tablet 5    losartan (COZAAR) 100 MG tablet Take 1 tablet by mouth daily 90 tablet 3    fluticasone (FLONASE) 50 MCG/ACT nasal spray 1 spray by Each Nostril route daily      allopurinol (ZYLOPRIM) 300 MG tablet Take 1 tablet by mouth daily      metFORMIN (GLUCOPHAGE-XR) 500 MG extended release tablet Take 1 tablet by mouth daily      hydrochlorothiazide (HYDRODIURIL) 25 MG tablet Take 1 tablet by mouth daily      lovastatin (MEVACOR) 40 MG tablet Take 40 mg by mouth daily      tamsulosin (FLOMAX) 0.4 MG capsule Take 0.4 mg by mouth daily      aspirin 81 MG chewable tablet Take 1 mg by mouth daily      Specialty Vitamins Products (PROSTATE) TABS Take 2 daily      Multiple Vitamin (ONE-A-DAY MENS) TABS Take 1 tablet by mouth daily      NONFORMULARY Nuretin: takes 2 daily      Multiple Vitamins-Minerals (ICAPS AREDS 2) CAPS Take by mouth 2 daily      [DISCONTINUED] amLODIPine (NORVASC) 10 MG tablet Take 1 tablet by mouth daily 90 tablet 1     No current facility-administered medications on file prior to visit. ALLERGIES:   Allergies as of 06/08/2022 - Fully Reviewed 06/08/2022   Allergen Reaction Noted    Amoxicillin Rash 04/17/2019      OBJECTIVE:   height is 5' 7\" (1.702 m) and weight is 225 lb (102.1 kg). His blood pressure is 134/68 and his pulse is 69. His oxygen saturation is 98%. PHYSICAL EXAM:    CONSTITUTIONAL: He is a 68y.o.-year-old who appears his stated age. He is alert and oriented x 3 and in no acute distress. HEENT: PERRL. No scleral icterus. No thrush, atraumatic, normocephalic. NECK: Supple, without cervical or supraclavicular lymphadenopathy:  CARDIOVASCULAR: S1 S2 RRR. Without murmer  RESPIRATORY & CHEST: Lungs are clear to auscultation and percussion. No wheezing, no crackles. Good air movement  GASTROINTESTINAL & ABDOMEN: Soft, nontender, positive bowel sounds in all quadrants, non-distended, without hepatosplenomegaly. GENITOURINARY: Deferred. MUSCULOSKELETAL: No tenderness to palpation of the axial skeleton. There is no clubbing. No cyanosis. No edema of the lower extremities. SKIN OF BODY: No rash or jaundice. PSYCHIATRIC EVALUATION: Normal affect.  Patient answers questions appropriately. HEMATOLOGIC/LYMPHATIC/ IMMUNOLOGIC: No palpable lymphadenopathy. NEUROLOGIC: Alert and oriented x 3. Groslly non-focal. Motor strength is 5+/5 in all muscle groups. The patient has a normal sensorium globally. Labs:    No flowsheet data found. All labs were personally reviewed by me any my interpretation is: CBC is mild anemia. Chem 8 is normal.     IMAGING:    No new chest imaging for me to evaluate. Pulmonary Functions Testing Results:    Complete PFT done on 5/26/2021  FEV1: 2.46/25  FVC: 3.09/86  FEV1/FVC: 79  Total lung capacity: 111%  Residual volume: 115%  Diffusion capacity: 149%  Airway resistance: 124%    This complete PFT was personally reviewed by me and my interpretation is: Spirometry values are within normal limits. Normal total lung capacity and residual volume with increased diffusion capacity. Airway resistance is increased. Impression: Possibly very mild obstructive airway disease. CPAP compliance summary     12/5/2021-6/2/2022 8/31/2021-11/28/2021 3/11/2021-6/8/2021   Days with device usage  180/180 days  90/90 days  88/90 days   Average Usage  7 hours 25 minutes  7 hours 26 minutes  7 hours 24 minutes   Days with device usage >4hrs  98%  100%  98%   Large Leak per night  9.7 L/min  10.9 L/min  12.1 L/min   AHI  1.4  1.5  1.6   CPAP settings  7 cmH2O  7 cmH2O  7 cmH2O   90th percentile pressure - - -   Mask  fullface mask  full facemask  fullface mask     DME: Medical service company      IMPRESSION AND RECOMMENDATIONS:     1. Shortness of breath  -Patient has shortness of breath most likely due to a combination of factors including paroxysmal atrial fibrillation, morbid obesity and sleep apnea. Cannot completely rule out an obstructive airway disease. Patient has been a non-smoker and no occupational exposure to dust, smoke or fumes.  -Complete PFT does not show any obstructive airway disease.   No indications for long-acting beta-2 agonist or long-acting muscarinic agents.    -He can continue using albuterol as needed. 2. DANO on CPAP  -CPAP download shows adequate usage with residual AHI within normal limits.  -No changes made to the pressure settings today. -Advised patient to change his mask interface regularly to maintain proper seal.    3. Paroxysmal atrial fibrillation ([address]  -As per cardiology services.  -Patient is on metoprolol 50 mg twice a day. -He will benefit with continued diuretic usage. Currently on hydrochlorothiazide. If starts to have more pedal edema, may need to be switched over to furosemide.  -Has been started on Eliquis. 4. Class 2 severe obesity due to excess calories with serious comorbidity and body mass index (BMI) of 37.0 to 37.9 in adult [facility])  -I strongly advised the patient to make efforts to lose weight. I discussed various modalities including moderate intensity intermittent exercises, diet control and bariatric surgery. If the patient loses even 10 to 15% of current body weight, it will be beneficial in improving the overall health. I spent 15 minutes in counseling the patient regarding medical nutrition therapy. Return in about 6 months (around 12/8/2022) for dano. Criselda Daly MD  Pulmonary Critical Care and Sleep Medicine  6/8/2022, 10:20 AM    This note was completed using dragon medical speech recognition software. Grammatical errors, random word insertions, pronoun errors and incomplete sentences are occasional consequences of this technology due to software limitations. If there are questions or concerns about the content of this note of information contained within the body of this dictation they should be addressed with the provider for clarification.

## 2022-06-19 PROCEDURE — 93298 REM INTERROG DEV EVAL SCRMS: CPT | Performed by: INTERNAL MEDICINE

## 2022-06-19 PROCEDURE — G2066 INTER DEVC REMOTE 30D: HCPCS | Performed by: INTERNAL MEDICINE

## 2022-06-21 ENCOUNTER — NURSE ONLY (OUTPATIENT)
Dept: CARDIOLOGY CLINIC | Age: 77
End: 2022-06-21
Payer: MEDICARE

## 2022-06-21 DIAGNOSIS — Z45.09 ENCOUNTER FOR ELECTRONIC ANALYSIS OF REVEAL EVENT RECORDER: ICD-10-CM

## 2022-06-21 DIAGNOSIS — I48.0 PAF (PAROXYSMAL ATRIAL FIBRILLATION) (HCC): ICD-10-CM

## 2022-06-21 NOTE — PROGRESS NOTES
We received a remote transmission from patient's monitor at home. Remote Linq report shows nocturnal karyn recordings. EP physician to review. We will continue to monitor remotely. End of 31-day monitoring period 6-21-22.

## 2022-07-26 ENCOUNTER — NURSE ONLY (OUTPATIENT)
Dept: CARDIOLOGY CLINIC | Age: 77
End: 2022-07-26
Payer: MEDICARE

## 2022-07-26 DIAGNOSIS — I48.0 PAF (PAROXYSMAL ATRIAL FIBRILLATION) (HCC): Primary | ICD-10-CM

## 2022-07-26 DIAGNOSIS — Z45.09 ENCOUNTER FOR ELECTRONIC ANALYSIS OF REVEAL EVENT RECORDER: ICD-10-CM

## 2022-07-26 PROCEDURE — 93298 REM INTERROG DEV EVAL SCRMS: CPT | Performed by: INTERNAL MEDICINE

## 2022-07-26 PROCEDURE — G2066 INTER DEVC REMOTE 30D: HCPCS | Performed by: INTERNAL MEDICINE

## 2022-08-17 NOTE — PROGRESS NOTES
1 tablet by mouth once daily Yes DANNIE Collins CNP   metoprolol tartrate (LOPRESSOR) 50 MG tablet Take 1/2 (one-half) tablet by mouth twice daily Yes DANNIE Barlow CNP   apixaban (ELIQUIS) 5 MG TABS tablet Take 1 tablet by mouth 2 times daily Yes Jenna Lynch MD   losartan (COZAAR) 100 MG tablet Take 1 tablet by mouth daily Yes Jenna Lynch MD   fluticasone (FLONASE) 50 MCG/ACT nasal spray 1 spray by Each Nostril route daily Yes Historical Provider, MD   allopurinol (ZYLOPRIM) 300 MG tablet Take 1 tablet by mouth daily Yes Historical Provider, MD   metFORMIN (GLUCOPHAGE-XR) 500 MG extended release tablet Take 1 tablet by mouth daily Yes Historical Provider, MD   hydrochlorothiazide (HYDRODIURIL) 25 MG tablet Take 1 tablet by mouth daily Yes Historical Provider, MD   lovastatin (MEVACOR) 40 MG tablet Take 40 mg by mouth daily Yes Historical Provider, MD   tamsulosin (FLOMAX) 0.4 MG capsule Take 0.4 mg by mouth daily Yes Historical Provider, MD   aspirin 81 MG chewable tablet Take 1 mg by mouth daily Yes Historical Provider, MD   Specialty Vitamins Products (PROSTATE) TABS Take 2 daily Yes Historical Provider, MD   Multiple Vitamin (ONE-A-DAY MENS) TABS Take 1 tablet by mouth daily Yes Historical Provider, MD   Multiple Vitamins-Minerals (ICAPS AREDS 2) CAPS Take by mouth 2 daily Yes Historical Provider, MD   amLODIPine (NORVASC) 10 MG tablet Take 1 tablet by mouth daily  DANNIE Barlow CNP   NONFORMULARY Nuretin: takes 2 daily  Historical Provider, MD      Past Medical History:  Past Medical History:   Diagnosis Date    A-fib ([facility] [address]     Clotting disorder ([facility] [address]     Diabetes mellitus ([facility] [address]     Hyperlipidemia     Hypertension      Past Surgical History:    has no past surgical history on file. Social History:  Reviewed. reports that he has never smoked. He has never used smokeless tobacco. He reports current alcohol use of about 3.0 standard drinks per week.  He reports that he does not use drugs. Family History:  Reviewed. family history includes Arrhythmia in his mother; Heart Attack in his maternal uncle; High Cholesterol in his father; Hypertension in his father. Review of Systems:  Constitutional: Positive for fatigue. Negative for fever, night sweats, chills, weight changes, or weakness  Skin: Negative for rash, dry skin, pruritus, bruising, bleeding, blood clots, or changes in skin pigment  HEENT: Negative for vision changes, ringing in the ears, sore throat, dysphagia, or swollen lymph nodes  Respiratory: Reviewed in HPI  Cardiovascular: Reviewed in HPI  Gastrointestinal: Negative for abdominal pain, N/V/D, constipation, or black/tarry stools  Genito-Urinary: Negative for dysuria, incontinence, urgency, or hematuria  Musculoskeletal: Negative for joint swelling, muscle pain, or injuries  Neurological/Psych: Negative for confusion, seizures, dizziness, headaches, balance issues or TIA-like symptoms. No anxiety, depression, or insomnia    Physical Examination:  Vitals:    09/14/22 0924   BP: 124/60   Pulse: (!) 48   SpO2: 96%        Wt Readings from Last 3 Encounters:   09/14/22 218 lb (98.9 kg)   06/08/22 225 lb (102.1 kg)   04/28/22 225 lb 6.4 oz (102.2 kg)     Constitutional: Cooperative and in no apparent distress, and appears well nourished  Skin: Warm and pink; no pallor, cyanosis, bruising, or clubbing  HEENT: Symmetric and normocephalic. PERRL, EOM intact. Conjunctiva pink with clear sclera. Mucus membranes pink and moist. Teeth intact. Thyroid smooth without nodules or goiter  Respiratory: Respirations symmetric and unlabored. Lungs clear to auscultation bilaterally, no wheezing, rhonchi, or crackles  Cardiovascular: Bradycardic rate and regular rhythm. S1/S2 present without murmurs, rubs, or gallops. Peripheral pulses 2+, capillary refill < 3 seconds. No elevation of JVP. No peripheral edema  Gastrointestinal: Abdomen soft and round.  Bowel sounds normoactive in all quadrants without tenderness or masses. + Obese  Musculoskeletal: Bilateral upper and lower extremity strength 5/5 with full ROM. Neurological/Psych: Awake and orientated to person, place and time. Calm affect, appropriate mood. Pertinent labs, diagnostic, device, and imaging results reviewed as a part of this visit    LABS    CBC:   Lab Results   Component Value Date    WBC 9.6 10/08/2020    HGB 13.4 (L) 10/08/2020    HCT 40.3 (L) 10/08/2020    MCV 84.0 10/08/2020     10/08/2020     BMP:   Lab Results   Component Value Date    CREATININE 1.2 10/08/2020    BUN 28 (H) 10/08/2020     10/08/2020    K 4.3 10/08/2020    CL 99 10/08/2020    CO2 26 10/08/2020     CrCl cannot be calculated (Patient's most recent lab result is older than the maximum 180 days allowed. ). Thyroid: No results found for: TSH, S0HMHLR, B5CKOJU, THYROIDAB  Lipid Panel:   Lab Results   Component Value Date/Time    CHOL 156 10/05/2020 11:09 AM    HDL 54 10/05/2020 11:09 AM    TRIG 139 10/05/2020 11:09 AM     LFTs:  Lab Results   Component Value Date    ALT 55 (H) 10/05/2020    AST 34 10/05/2020    ALKPHOS 70 10/05/2020    BILITOT 0.6 10/05/2020     Coags:   Lab Results   Component Value Date    PROTIME 22.3 (H) 10/09/2020    INR 1.91 (H) 10/09/2020       EC2022  - Marked sinus bradycardia. Rate 49, , QTc 411    Echo: 3/19  Overall left ventricular ejection fraction is estimated to be 50-55%. The left ventricular function is low normal. There is mild global hypokinesis of the left ventricle. The diastolic function is impaired and classified as Grade 2   (psuedonormalization). The left atrium is moderately dilated. There is mild mitral regurgitation. Right ventricular systolic pressure is indeterminate due to poorly visualized tricuspid regurgitation. LISSA: 10/20   Overall left ventricular function is normal. Mitral valve is structurally normal.   Trivial mitral regurgitation is present.    There is no evidence of mass or thrombus in the left atrium or appendage. Tricuspid aortic valve. No evidence of aortic valve regurgitation. GXT: 4/19   Normal LV size with hyperdynamic systolic function. Left ventricular ejection fraction of 73 %. There is normal isotope uptake at stress and rest.    There is no evidence of myocardial ischemia or scar. Assessment:    1. Paroxysmal Atrial Fibrillation   - S/p RFCA with PVI (10/5/20, Dr. Keith Coffman)   - Currently in NSR   - Continue metoprolol; will reduce to 12.5 mg BID given his fatigue  ~ Instructed him to take extra 25 mg at onset of atrial fibrillation and to call if he remains in AF after one dose for further instruction (Told him not to take extra doses if SBP <100 prior to taking)     - [id number] score: 3 (Age, HTN, DM) ; [id number] Vasc score and anticoagulation discussed. High risk for stroke and thromboembolism. Anticoagulation is recommended. Risk of bleeding was discussed. ~ Discussed risks of silent atrial fibrillation, pt aware of risks off anti-coagulation. Pt takes eliquis 5 mg BID PRN. Instructed to start at onset of atrial fibrillation BID and call office if symptoms do not resolve within 24 hours      - Afib risk factors including age, HTN, obesity, inactivity and DANO were discussed with patient. Risk factor modification recommended               ~ TSH 1.87 (10/20)                 - Treatment options including cardioversion, rate control strategy, antiarrhythmics, anticoagulation and possible ablation were discussed with patient. Risks, benefits and alternative of each treatment options were explained. All questions answered     ~ If his burden worsens, will likely need to consider repeat ablation vs PPM as AAD/medication options are limited by his bradycardic HR on low dose metoprolol    2. Implantable Loop Recorder  - S/p ILR insertion  -The CIED was interrogated and programmed and I supervised and reviewed all the data.  All findings and changes are in device interrogation sheat and reflect my personal interpretation and changes and is scanned to Epic  - Device shows: AF burden 0.9%  - Follow up with device clinic as scheduled     - His device has reached RRT. Discussed options, including removal vs removal with replacement. At this point, given the recurrence of PAF and his bradycardia, recommend replacement to monitor for both atrial, as well as bradyarrhythmias. Pt VU and agrees to proceed    3. Bradycardia   - Noted on EKG today, as well as past EKGs   - He does have some slight worsening in his fatigue/energy levels   - Will reduce metoprolol to 12.5 mg BID. If he continues to remain bradycardic on lower dose BB, may need to consider PPM, particularly if he starts having more frequent AF with RVR    4. HTN   - Controlled at home: Goal <130/80   - Continue current medications             - Encouraged to monitor and log BP readings at home, then bring log to next visit   - Discussed importance of low sodium diet, weight control and exercise     5. DANO   - Stable: Uses CPAP   - Encourage to use CPAP to prevent long term effects of untreated DANO     6. Hyperlipidemia   - Controlled. Goal: LDL <100               ~ LDL 74 (10/20); needs rechecked   - On lovastatin 40 mg QD   - Discussed diet, weight loss, and exercise needs   - Followed per PCP    7. Type 2 DM  - Stable: Goal A1C <7  - Continue with current medications    ~ Consider adding Jardiance for diabetes, as well as cardiac indications  - Followed per PCP. Pt will discuss further with him    8. Obesity  - Body mass index is 34.14 kg/m². - Complicating assessment and treatment. Placing patient at risk for multiple co-morbidities as well as early death and contributing to the patient's presentation  - Discussed weight loss and patient was encouraged to reduce calorie intake and increase exercise    Plan:  1. Reduce metoprolol to 12.5 mg twice per day  2.  Ok to take 25 mg of metoprolol at onset of afib episode if

## 2022-08-30 ENCOUNTER — NURSE ONLY (OUTPATIENT)
Dept: CARDIOLOGY CLINIC | Age: 77
End: 2022-08-30
Payer: MEDICARE

## 2022-08-30 DIAGNOSIS — Z45.09 ENCOUNTER FOR ELECTRONIC ANALYSIS OF REVEAL EVENT RECORDER: ICD-10-CM

## 2022-08-30 DIAGNOSIS — I48.0 PAF (PAROXYSMAL ATRIAL FIBRILLATION) (HCC): Primary | ICD-10-CM

## 2022-08-30 PROCEDURE — G2066 INTER DEVC REMOTE 30D: HCPCS | Performed by: INTERNAL MEDICINE

## 2022-08-30 PROCEDURE — 93298 REM INTERROG DEV EVAL SCRMS: CPT | Performed by: INTERNAL MEDICINE

## 2022-08-31 ENCOUNTER — TELEPHONE (OUTPATIENT)
Dept: CARDIOLOGY CLINIC | Age: 77
End: 2022-08-31

## 2022-08-31 NOTE — PROGRESS NOTES
We received a remote transmission from patient's monitor at home. Remote Linq report shows nocturnal karyn recordings. Pt has reached the DAMEON. Message sent to office staff. EP physician to review. We will continue to monitor remotely. Implanted for AF management. Pt is on Eliquis. End of 31-day monitoring period 8-30-22.

## 2022-09-14 ENCOUNTER — NURSE ONLY (OUTPATIENT)
Dept: CARDIOLOGY CLINIC | Age: 77
End: 2022-09-14

## 2022-09-14 ENCOUNTER — OFFICE VISIT (OUTPATIENT)
Dept: CARDIOLOGY CLINIC | Age: 77
End: 2022-09-14
Payer: MEDICARE

## 2022-09-14 VITALS
DIASTOLIC BLOOD PRESSURE: 60 MMHG | BODY MASS INDEX: 34.21 KG/M2 | OXYGEN SATURATION: 96 % | WEIGHT: 218 LBS | HEART RATE: 48 BPM | HEIGHT: 67 IN | SYSTOLIC BLOOD PRESSURE: 124 MMHG

## 2022-09-14 DIAGNOSIS — Z45.09 ENCOUNTER FOR ELECTRONIC ANALYSIS OF REVEAL EVENT RECORDER: ICD-10-CM

## 2022-09-14 DIAGNOSIS — E66.01 CLASS 2 SEVERE OBESITY DUE TO EXCESS CALORIES WITH SERIOUS COMORBIDITY AND BODY MASS INDEX (BMI) OF 35.0 TO 35.9 IN ADULT (HCC): ICD-10-CM

## 2022-09-14 DIAGNOSIS — E78.49 OTHER HYPERLIPIDEMIA: ICD-10-CM

## 2022-09-14 DIAGNOSIS — I48.0 PAF (PAROXYSMAL ATRIAL FIBRILLATION) (HCC): Primary | ICD-10-CM

## 2022-09-14 DIAGNOSIS — Z95.818 STATUS POST PLACEMENT OF IMPLANTABLE LOOP RECORDER: ICD-10-CM

## 2022-09-14 DIAGNOSIS — I10 ESSENTIAL HYPERTENSION: ICD-10-CM

## 2022-09-14 PROCEDURE — 1036F TOBACCO NON-USER: CPT | Performed by: NURSE PRACTITIONER

## 2022-09-14 PROCEDURE — 1123F ACP DISCUSS/DSCN MKR DOCD: CPT | Performed by: NURSE PRACTITIONER

## 2022-09-14 PROCEDURE — 99214 OFFICE O/P EST MOD 30 MIN: CPT | Performed by: NURSE PRACTITIONER

## 2022-09-14 PROCEDURE — G8417 CALC BMI ABV UP PARAM F/U: HCPCS | Performed by: NURSE PRACTITIONER

## 2022-09-14 PROCEDURE — 93000 ELECTROCARDIOGRAM COMPLETE: CPT | Performed by: NURSE PRACTITIONER

## 2022-09-14 PROCEDURE — G8427 DOCREV CUR MEDS BY ELIG CLIN: HCPCS | Performed by: NURSE PRACTITIONER

## 2022-09-14 NOTE — PROGRESS NOTES
Patient comes in for interrogation of their implanted loop recorder. SB today. Interrogation shows Battery Status reached RRT on 8/21/2022  AF with a 0.9% burden. Last on 9/10/2022, Patient symptomatic. Many karyn episodes noted. ILR implanted for AF  Patient remains on Eliquis and metoprolol. Please see interrogation for more detail. Patient will see NPSR today and we will continue to follow the Patient remotely.

## 2022-09-14 NOTE — LETTER
[facility] 81  EP Procedure Sheet    9/14/22  Lyndsay Carey  1945  EP Procedures  [] Pacemaker implant (single/dual) [] EP Study   [] ICD implant (single/dual) [] Atrial flutter ablation (LISSA Y/N)   [] Biv implant ICD [] Tilt Table   [] Biv implant PPM [] Atrial fibrillation ablation (LISSA Yes)   [] Generator Change (PPM/ICD/BiV) [] SVT ablation   [] Lead revision (RV/LA/RA) (<1 month) [] PVC ablation     [] Lead extraction +/- upgrade (BiV/PPM/ICD) [] VT Ischemic/ non-ischemic   [x] Loop removal with replacement [] VT RVOT   [] Cardioversion [] VT Left sided   [] LISSA [] AVN ablation   Equipment  [] [employer]onic  [] [employer] Mapping System   [] [employer] [] [address]   [] [employer] [] CryoAblation   [] Biotronik [] Laser Lead Extraction   EP Procedures Scheduling Request  # hours Requested  []1 []2 []2-4 [] 4-6 Scheduled  Date:   Specific Day  Completed    Anesthesia []yes []no F/u Date:   CT surgery backup []yes []no COVID     Overnight stay      Performing MD  []RMM [x]MXA   []MKW [] Other _______ First vs repeat   []1st [] 2nd [] 3rd   Pre-Procedure Labs / Imaging  [] PT/INR [] Type & cross   [] CBC [] Units PRBC   [] BMP/Mg [] Units FFP   [] Venogram [] Cardiac CTA for Pulmonary vein mapping     RN INITIALS: SR    Patient Instructions  Do not eat or drink after midnight the night prior to procedure  Dx:PAF  ICD-10 code: I49

## 2022-09-14 NOTE — PATIENT INSTRUCTIONS
1. Reduce metoprolol to 12.5 mg twice per day  2. Ok to take 25 mg of metoprolol at onset of afib episode if blood pressure >100  3. Will schedule for loop removal and replacement  4. Continue weight loss and exercise  5. Follow up with PCP for labs  6.  Consider Jardiance for both diabetic and cardiac protection

## 2022-10-12 ENCOUNTER — HOSPITAL ENCOUNTER (OUTPATIENT)
Dept: CARDIAC CATH/INVASIVE PROCEDURES | Age: 77
Discharge: HOME | End: 2022-10-12
Attending: INTERNAL MEDICINE | Admitting: INTERNAL MEDICINE
Payer: MEDICARE

## 2022-10-12 PROCEDURE — 33286 RMVL SUBQ CAR RHYTHM MNTR: CPT | Performed by: INTERNAL MEDICINE

## 2022-10-12 PROCEDURE — C1764 EVENT RECORDER, CARDIAC: HCPCS

## 2022-10-12 PROCEDURE — 33285 INSJ SUBQ CAR RHYTHM MNTR: CPT | Performed by: INTERNAL MEDICINE

## 2022-10-12 PROCEDURE — 33285 INSJ SUBQ CAR RHYTHM MNTR: CPT

## 2022-10-12 PROCEDURE — 33286 RMVL SUBQ CAR RHYTHM MNTR: CPT

## 2022-10-12 NOTE — H&P
[facility], [facility] Complaint:   Chief Complaint   Patient presents with    Follow-up     1 episode of afib 1w ago while doing yard work. No other cardiac concerns. History of Present Illness: History obtained from patient and medical record. Garcia Isbell is 68 y.o. male with a past medical history of atrial fibrillation, HTN, HLD, DM, DANO, and obesity. Hx of multiple DCCV. S/p RFCA with PVI (10/5/20)    -Interval history:   . His daughter is present for the visit. He had been doing very well. He had an episode of AF last week while doing yard work. He thinks he was dehydrated and possibly overworking himself. He states he feels \"off\" when in AF and he notes an elevation in his HR on his watch, which confirms the irregular HR. He states it was in the 120-130s for quite some time that day. He almost took an extra metoprolol, but did not as his BP was marginal so he went to bed and woke up back in sinus rhythm the next morning. He has had more bradycardia at night on his loop. He states he is wearing his CPAP without any fitting/leak issues. He does admit to some fatigue. He has been working with a  and has lost 10 lbs. He is on a \"carnivore diet\". They are curious if his recent elevation in blood sugars has been a problem for his heart. Denies having chest pain, palpitations, shortness of breath, orthopnea/PND, cough, or dizziness at the time of this visit. With regard to medication therapy the patient has been compliant with prescribed regimen. They have tolerated therapy to date. Allergies: Allergies   Allergen Reactions    Amoxicillin Rash     Home Medications:  Prior to Visit Medications    Medication Sig Taking?  Authorizing Provider   amLODIPine (NORVASC) 10 MG tablet Take 1 tablet by mouth once daily Yes DANNIE Morin - CNP   metoprolol tartrate (LOPRESSOR) 50 MG tablet Take 1/2 (one-half) tablet by mouth twice daily Yes Lev Mcnally, APRN - CNP apixaban (ELIQUIS) 5 MG TABS tablet Take 1 tablet by mouth 2 times daily Yes Celia Villalba MD   losartan (COZAAR) 100 MG tablet Take 1 tablet by mouth daily Yes Celia Villalba MD   fluticasone (FLONASE) 50 MCG/ACT nasal spray 1 spray by Each Nostril route daily Yes Historical Provider, MD   allopurinol (ZYLOPRIM) 300 MG tablet Take 1 tablet by mouth daily Yes Historical Provider, MD   metFORMIN (GLUCOPHAGE-XR) 500 MG extended release tablet Take 1 tablet by mouth daily Yes Historical Provider, MD   hydrochlorothiazide (HYDRODIURIL) 25 MG tablet Take 1 tablet by mouth daily Yes Historical Provider, MD   lovastatin (MEVACOR) 40 MG tablet Take 40 mg by mouth daily Yes Historical Provider, MD   tamsulosin (FLOMAX) 0.4 MG capsule Take 0.4 mg by mouth daily Yes Historical Provider, MD   aspirin 81 MG chewable tablet Take 1 mg by mouth daily Yes Historical Provider, MD   Specialty Vitamins Products (PROSTATE) TABS Take 2 daily Yes Historical Provider, MD   Multiple Vitamin (ONE-A-DAY MENS) TABS Take 1 tablet by mouth daily Yes Historical Provider, MD   Multiple Vitamins-Minerals (ICAPS AREDS 2) CAPS Take by mouth 2 daily Yes Historical Provider, MD   amLODIPine (NORVASC) 10 MG tablet Take 1 tablet by mouth daily  DANNIE Landaverde - CNP   NONFORMULARY Nuretin: takes 2 daily  Historical Provider, MD      Past Medical History:  Past Medical History:   Diagnosis Date    A-fib ([facility] [address]     Clotting disorder ([facility] [address]     Diabetes mellitus ([facility] [address]     Hyperlipidemia     Hypertension      Past Surgical History:    has no past surgical history on file. Social History:  Reviewed. reports that he has never smoked. He has never used smokeless tobacco. He reports current alcohol use of about 3.0 standard drinks per week. He reports that he does not use drugs. Family History:  Reviewed. family history includes Arrhythmia in his mother; Heart Attack in his maternal uncle; High Cholesterol in his father; Hypertension in his father. time. Calm affect, appropriate mood. Pertinent labs, diagnostic, device, and imaging results reviewed as a part of this visit    LABS    CBC:   Lab Results   Component Value Date    WBC 9.6 10/08/2020    HGB 13.4 (L) 10/08/2020    HCT 40.3 (L) 10/08/2020    MCV 84.0 10/08/2020     10/08/2020     BMP:   Lab Results   Component Value Date    CREATININE 1.2 10/08/2020    BUN 28 (H) 10/08/2020     10/08/2020    K 4.3 10/08/2020    CL 99 10/08/2020    CO2 26 10/08/2020     CrCl cannot be calculated (Patient's most recent lab result is older than the maximum 180 days allowed. ). Thyroid: No results found for: TSH, M6UPHOE, P2IAPJK, THYROIDAB  Lipid Panel:   Lab Results   Component Value Date/Time    CHOL 156 10/05/2020 11:09 AM    HDL 54 10/05/2020 11:09 AM    TRIG 139 10/05/2020 11:09 AM     LFTs:  Lab Results   Component Value Date    ALT 55 (H) 10/05/2020    AST 34 10/05/2020    ALKPHOS 70 10/05/2020    BILITOT 0.6 10/05/2020     Coags:   Lab Results   Component Value Date    PROTIME 22.3 (H) 10/09/2020    INR 1.91 (H) 10/09/2020       EC2022  - Marked sinus bradycardia. Rate 49, , QTc 411    Echo: 3/19  Overall left ventricular ejection fraction is estimated to be 50-55%. The left ventricular function is low normal. There is mild global hypokinesis of the left ventricle. The diastolic function is impaired and classified as Grade 2   (psuedonormalization). The left atrium is moderately dilated. There is mild mitral regurgitation. Right ventricular systolic pressure is indeterminate due to poorly visualized tricuspid regurgitation. LISSA: 10/20   Overall left ventricular function is normal. Mitral valve is structurally normal.   Trivial mitral regurgitation is present. There is no evidence of mass or thrombus in the left atrium or appendage. Tricuspid aortic valve. No evidence of aortic valve regurgitation. GXT:    Normal LV size with hyperdynamic systolic function. Left ventricular ejection fraction of 73 %. There is normal isotope uptake at stress and rest.    There is no evidence of myocardial ischemia or scar. Assessment:    1. Paroxysmal Atrial Fibrillation   - S/p RFCA with PVI (10/5/20, Dr. Rossy Pettit)   - Currently in NSR   - Continue metoprolol; will reduce to 12.5 mg BID given his fatigue  ~ Instructed him to take extra 25 mg at onset of atrial fibrillation and to call if he remains in AF after one dose for further instruction (Told him not to take extra doses if SBP <100 prior to taking)     - [id number] score: 3 (Age, HTN, DM) ; [id number] Vasc score and anticoagulation discussed. High risk for stroke and thromboembolism. Anticoagulation is recommended. Risk of bleeding was discussed. ~ Discussed risks of silent atrial fibrillation, pt aware of risks off anti-coagulation. Pt takes eliquis 5 mg BID PRN. Instructed to start at onset of atrial fibrillation BID and call office if symptoms do not resolve within 24 hours      - Afib risk factors including age, HTN, obesity, inactivity and DANO were discussed with patient. Risk factor modification recommended               ~ TSH 1.87 (10/20)                 - Treatment options including cardioversion, rate control strategy, antiarrhythmics, anticoagulation and possible ablation were discussed with patient. Risks, benefits and alternative of each treatment options were explained. All questions answered     ~ If his burden worsens, will likely need to consider repeat ablation vs PPM as AAD/medication options are limited by his bradycardic HR on low dose metoprolol    2. Implantable Loop Recorder  - S/p ILR insertion  -The CIED was interrogated and programmed and I supervised and reviewed all the data.  All findings and changes are in device interrogation sheat and reflect my personal interpretation and changes and is scanned to Epic  - Device shows: AF burden 0.9%  - Follow up with device clinic as scheduled     - His device has reached RRT. Discussed options, including removal vs removal with replacement. At this point, given the recurrence of PAF and his bradycardia, recommend replacement to monitor for both atrial, as well as bradyarrhythmias. Pt VU and agrees to proceed    3. Bradycardia   - Noted on EKG today, as well as past EKGs   - He does have some slight worsening in his fatigue/energy levels   - Will reduce metoprolol to 12.5 mg BID. If he continues to remain bradycardic on lower dose BB, may need to consider PPM, particularly if he starts having more frequent AF with RVR    4. HTN   - Controlled at home: Goal <130/80   - Continue current medications             - Encouraged to monitor and log BP readings at home, then bring log to next visit   - Discussed importance of low sodium diet, weight control and exercise     5. DANO   - Stable: Uses CPAP   - Encourage to use CPAP to prevent long term effects of untreated DANO     6. Hyperlipidemia   - Controlled. Goal: LDL <100               ~ LDL 74 (10/20); needs rechecked   - On lovastatin 40 mg QD   - Discussed diet, weight loss, and exercise needs   - Followed per PCP    7. Type 2 DM  - Stable: Goal A1C <7  - Continue with current medications    ~ Consider adding Jardiance for diabetes, as well as cardiac indications  - Followed per PCP. Pt will discuss further with him    8. Obesity  - Body mass index is 34.14 kg/m². - Complicating assessment and treatment.  Placing patient at risk for multiple co-morbidities as well as early death and contributing to the patient's presentation  - Discussed weight loss and patient was encouraged to reduce calorie intake and increase exercise    Plan:  loop removal and replacement

## 2022-10-12 NOTE — PROCEDURES
[address]     Electrophysiology Procedure Note       Date of Procedure: 10/12/2022  Patient's Name: Killian Cameron  YOB: 1945   Medical Record Number: [id number]  Referring Physician: Jaquelin Moffett MD  Procedure Performed by: Jaquelin Moffett MD    Procedure performed:      Removal of implantable loop recorder   Insertion of implantable loop monitor    Indications for procedure:      Killian Cameron 68 y.o. male with PMH   Device at [facility], atrial fibrillation      Loop recorder removal:   The patient was brought to the electrophysiology laboratory in stable condition. The patient was in a fasting and non-sedated state. The risks, benefits and alternatives of the procedure were discussed with the patient    The risks including, but not limited to, the risks of bleeding, infection, injury to surrounding structures, were discussed in detail. Patient opted to proceed with the device implantation. Written informed consent was signed and placed in the chart. The patient was prepped and draped in a sterile fashion. A timeout protocol was completed to identify the patient and the procedure being performed. Chest was prepped and draped in the usual sterile fashion. After injection of 2% lidocaine, an incision was made over the previous scar and the old loop recorder device was removed. Then using the insertion tool the incision was extended and then the new loop monitor was tunneled into the previous pocket. The incision was closed using Dermabond and Steri-Strips. All sponge and needle counts were reported as correct at the end of the procedure. The patient tolerated the procedure well and there were no complications. The patient tolerated the procedure well and there were no complications. Patient was transported to the holding area in stable condition.    Blood loss <5 cc  No complication  Conclusion:   Removal of implantable loop recorder   Insertion of new loop monitor  The new device is [employer] Linq 2  Serial number [id number]  R waves 0.62  Plan:Discharge in 20 minutes

## 2022-10-12 NOTE — DISCHARGE INSTRUCTIONS
LINQ INSERTION DISCHARGE INSTRUCTIONS        Remove outer dressing in 48 hours. Leave steri strips intact. Do not get the incision wet for one week. You may be sore, bruised and have a small amount of drainage around the incision site.     Please contact the office if you notice any signs of infection:  Redness / swelling at the incision site  Fever  Yellow drainage at the site  Pain      Questions or concerns, call Dr Valentin Hicks at Phone: [phone]

## 2022-10-13 DIAGNOSIS — I10 HYPERTENSION, UNSPECIFIED TYPE: Primary | ICD-10-CM

## 2022-10-14 RX ORDER — APIXABAN 5 MG/1
TABLET, FILM COATED ORAL
Qty: 60 TABLET | Refills: 0 | Status: SHIPPED | OUTPATIENT
Start: 2022-10-14 | End: 2022-11-11

## 2022-10-19 ENCOUNTER — NURSE ONLY (OUTPATIENT)
Dept: CARDIOLOGY CLINIC | Age: 77
End: 2022-10-19

## 2022-10-19 NOTE — TELEPHONE ENCOUNTER
Called pt about the message below and he verbalized understanding.
Last OV: 09/14/22    Labs: 10/08/20    Last Refill: 04/22/22    Next OV: 01/18/23
Needs repeat lab work -orders placed
Unable to assess due to medical condition

## 2022-10-19 NOTE — PROGRESS NOTES
Patient comes in for interrogation of their implanted loop recorder. Battery life is good. Interrogation shows no observations, 0.2% PVCs. Implanted for Suspected AF. Patient remains on Eliquis. SS had dry blood, Nany LOPEZ removed SS. Surrounding incision was red and raw appears due to pool of blood surrounding incision site under SS. Patients incision is healing nicely. NPAL viewed incision site and advised to leave site open for surrounding tissue to heal. Advised pt to call office if notices more redness, swelling, fever, or drainage from incision. Please see interrogation for more detail. Will continue to follow the patient remotely and have pt f/u with NPSR in 3 months.

## 2022-10-25 ENCOUNTER — NURSE ONLY (OUTPATIENT)
Dept: CARDIOLOGY CLINIC | Age: 77
End: 2022-10-25
Payer: MEDICARE

## 2022-10-25 DIAGNOSIS — Z45.09 ENCOUNTER FOR ELECTRONIC ANALYSIS OF REVEAL EVENT RECORDER: ICD-10-CM

## 2022-10-25 DIAGNOSIS — I48.0 PAF (PAROXYSMAL ATRIAL FIBRILLATION) (HCC): Primary | ICD-10-CM

## 2022-10-25 PROCEDURE — G2066 INTER DEVC REMOTE 30D: HCPCS | Performed by: INTERNAL MEDICINE

## 2022-10-25 PROCEDURE — 93298 REM INTERROG DEV EVAL SCRMS: CPT | Performed by: INTERNAL MEDICINE

## 2022-10-26 NOTE — PROGRESS NOTES
We received a remote transmission from patient's monitor at home. Remote Linq report shows no arrhythmias. EP physician to review. We will continue to monitor remotely. Implanted for AF management. Pt is on Eliquis. End of 31-day monitoring period 10-25-22.

## 2022-10-28 ENCOUNTER — TELEPHONE (OUTPATIENT)
Dept: CARDIOLOGY CLINIC | Age: 77
End: 2022-10-28

## 2022-10-28 NOTE — TELEPHONE ENCOUNTER
I called patient to see where he gets his INR done at. We received his INR this morning and we are the CC don't follow the Patient.

## 2022-11-10 DIAGNOSIS — I10 ESSENTIAL HYPERTENSION: ICD-10-CM

## 2022-11-10 DIAGNOSIS — I48.0 PAF (PAROXYSMAL ATRIAL FIBRILLATION) (HCC): Primary | ICD-10-CM

## 2022-11-11 RX ORDER — APIXABAN 5 MG/1
TABLET, FILM COATED ORAL
Qty: 60 TABLET | Refills: 0 | Status: SHIPPED | OUTPATIENT
Start: 2022-11-11

## 2022-11-11 NOTE — TELEPHONE ENCOUNTER
Needs labs for more refills, CBC and BMP. Please call patient/PCP and find out if recent labs are completed.

## 2022-11-11 NOTE — TELEPHONE ENCOUNTER
Received refill request for Eliquis from [facility] : 9/14/22 NPSR    Last Labs : 9/7/22 care everywhere     Last Refill : 10/14/22 60w0    Next OV : 1/18/23 NPSR

## 2022-11-14 RX ORDER — APIXABAN 5 MG/1
TABLET, FILM COATED ORAL
Qty: 60 TABLET | Refills: 0 | OUTPATIENT
Start: 2022-11-14

## 2022-11-16 NOTE — TELEPHONE ENCOUNTER
LMOM for patient to return call to office. Need to know if patient had his labs done, if not he will need to get the cbc and bmp per NPAL. Orders are in [facility], not sure what lab patient uses, may have to fax labs for patient.

## 2022-11-18 NOTE — TELEPHONE ENCOUNTER
Pt called back stating they had labs done at the PCP's office Shreya Arvizu but is not sure if they were they same labs that the cardio is looking for pt wants to know if office can see the labs or request the labs so they do not repeat the same labs, ID they labs are not the same can the orders be faxed to PCP's office?     Pls  advise thank you     Shreya Arvizu  Ph. [phone]  Fax : [phone]

## 2022-11-28 ENCOUNTER — NURSE ONLY (OUTPATIENT)
Dept: CARDIOLOGY CLINIC | Age: 77
End: 2022-11-28
Payer: MEDICARE

## 2022-11-28 DIAGNOSIS — Z45.09 ENCOUNTER FOR ELECTRONIC ANALYSIS OF REVEAL EVENT RECORDER: ICD-10-CM

## 2022-11-28 DIAGNOSIS — I48.0 PAF (PAROXYSMAL ATRIAL FIBRILLATION) (HCC): Primary | ICD-10-CM

## 2022-11-28 PROCEDURE — 93298 REM INTERROG DEV EVAL SCRMS: CPT | Performed by: INTERNAL MEDICINE

## 2022-11-28 PROCEDURE — G2066 INTER DEVC REMOTE 30D: HCPCS | Performed by: INTERNAL MEDICINE

## 2022-11-28 NOTE — PROGRESS NOTES
We received a remote transmission from patient's monitor at home. Remote Linq report shows no arrhythmias. EP physician to review. We will continue to monitor remotely. Implanted for AF management. Pt is on Eliquis. End of 31-day monitoring period 11-28-22.

## 2022-12-08 RX ORDER — APIXABAN 5 MG/1
TABLET, FILM COATED ORAL
Qty: 60 TABLET | Refills: 5 | Status: SHIPPED | OUTPATIENT
Start: 2022-12-08

## 2022-12-08 NOTE — TELEPHONE ENCOUNTER
Received refill request for Eliquis from [facility] : 9/14/22 npsr    Last Labs : 9/7/22 care everywhere     Last Refill : 11/11/22 60w0     Next OV : 1/18/23 npsr

## 2022-12-28 PROBLEM — R00.1 BRADYCARDIA: Status: ACTIVE | Noted: 2022-12-28

## 2022-12-28 NOTE — PROGRESS NOTES
[facility]   Electrophysiology  DORCAS Hernandez  Attending EP: Dr. Manny Litten    Date: 1/18/2023  I had the privilege of visiting Desi Lopes in the office. Chief Complaint:   Chief Complaint   Patient presents with    Atrial Fibrillation    Hypertension       History of Present Illness: History obtained from patient and medical record. Desi Lopes is 68 y.o. male with a past medical history of atrial fibrillation, HTN, HLD, DM, DANO, and obesity. Hx of multiple DCCV. S/p RFCA with PVI (10/5/20)    -Interval history: Today, Desi Lopes is being seen for routine follow up. He is feeling well from a cardiac standpoint. He recently had a cold/bronchitis. He took ABX and has been feeling better, but still has a mild, dry cough. He remains sinus bradycardic on EKG today. He denies any significant symptoms with bradycardia. His loop shows no recurrent AF. He is wearing his CPAP compliantly at night and tolerating it well. His PCP recently did blood work in the fall. Denies having chest pain, palpitations, shortness of breath, orthopnea/PND, cough, or dizziness at the time of this visit. With regard to medication therapy the patient has been compliant with prescribed regimen. They have tolerated therapy to date. Allergies: Allergies   Allergen Reactions    Amoxicillin Rash     Home Medications:  Prior to Visit Medications    Medication Sig Taking?  Authorizing Provider   albuterol sulfate HFA (VENTOLIN HFA) 108 (90 Base) MCG/ACT inhaler Inhale 2 puffs into the lungs 4 times daily as needed for Wheezing Yes Marcus Meade MD   ELIQUIS 5 MG TABS tablet Take 1 tablet by mouth twice daily Yes DANNIE Hernandez CNP   metoprolol tartrate (LOPRESSOR) 25 MG tablet Take 0.5 tablets by mouth 2 times daily Ok to take extra 25 mg of metoprolol at onset of atrial fibrillation if blood pressure >100 Yes DANNIE Hernandez CNP   amLODIPine (NORVASC) 10 MG tablet Take 1 tablet by mouth once daily Yes DANNIE Parada CNP   losartan (COZAAR) 100 MG tablet Take 1 tablet by mouth daily Yes Liz Wolf MD   fluticasone (FLONASE) 50 MCG/ACT nasal spray 1 spray by Each Nostril route daily Yes Historical Provider, MD   allopurinol (ZYLOPRIM) 300 MG tablet Take 1 tablet by mouth daily Yes Historical Provider, MD   metFORMIN (GLUCOPHAGE-XR) 500 MG extended release tablet Take 1 tablet by mouth daily Yes Historical Provider, MD   hydrochlorothiazide (HYDRODIURIL) 25 MG tablet Take 1 tablet by mouth daily Yes Historical Provider, MD   lovastatin (MEVACOR) 40 MG tablet Take 40 mg by mouth daily Yes Historical Provider, MD   tamsulosin (FLOMAX) 0.4 MG capsule Take 0.4 mg by mouth daily Yes Historical Provider, MD   aspirin 81 MG chewable tablet Take 1 mg by mouth daily Yes Historical Provider, MD   Specialty Vitamins Products (PROSTATE) TABS Take 2 daily Yes Historical Provider, MD   Multiple Vitamin (ONE-A-DAY MENS) TABS Take 1 tablet by mouth daily Yes Historical Provider, MD   NONFORMULARY Nuretin: takes 2 daily Yes Historical Provider, MD   Multiple Vitamins-Minerals (ICAPS AREDS 2) CAPS Take by mouth 2 daily Yes Historical Provider, MD   amLODIPine (NORVASC) 10 MG tablet Take 1 tablet by mouth daily  Michaelle Huang APRN - CNP      Past Medical History:  Past Medical History:   Diagnosis Date    A-fib ([facility] [address]     Clotting disorder ([facility] [address]     Di[facility]     Hyperlipidemia     Hypertension      Past Surgical History:    has no past surgical history on file. Social History:  Reviewed. reports that he has never smoked. He has never used smokeless tobacco. He reports current alcohol use of about 3.0 standard drinks per week. He reports that he does not use drugs. Family History:  Reviewed. family history includes Arrhythmia in his mother; Heart Attack in his maternal uncle; High Cholesterol in his father; Hypertension in his father.      Review of Systems:  Constitutional: Positive for fatigue (chronic, stable). Negative for fever, night sweats, chills, weight changes, or weakness  Skin: Negative for rash, dry skin, pruritus, bruising, bleeding, blood clots, or changes in skin pigment  HEENT: Negative for vision changes, ringing in the ears, sore throat, dysphagia, or swollen lymph nodes  Respiratory: Reviewed in HPI  Cardiovascular: Reviewed in HPI  Gastrointestinal: Negative for abdominal pain, N/V/D, constipation, or black/tarry stools  Genito-Urinary: Negative for dysuria, incontinence, urgency, or hematuria  Musculoskeletal: Negative for joint swelling, muscle pain, or injuries  Neurological/Psych: Negative for confusion, seizures, dizziness, headaches, balance issues or TIA-like symptoms. No anxiety, depression, or insomnia    Physical Examination:  Vitals:    01/18/23 0944   BP: 132/62   Pulse: 57   SpO2: 97%          Wt Readings from Last 3 Encounters:   01/18/23 221 lb 9.6 oz (100.5 kg)   01/16/23 222 lb (100.7 kg)   09/14/22 218 lb (98.9 kg)     Constitutional: Cooperative and in no apparent distress, and appears well nourished  Skin: Warm and pink; no pallor, cyanosis, bruising, or clubbing  HEENT: Symmetric and normocephalic. PERRL, EOM intact. Conjunctiva pink with clear sclera. Mucus membranes pink and moist. Teeth intact. Thyroid smooth without nodules or goiter  Respiratory: Respirations symmetric and unlabored. Lungs clear to auscultation bilaterally, no wheezing, rhonchi, or crackles  Cardiovascular: Bradycardic rate and regular rhythm. S1/S2 present without murmurs, rubs, or gallops. Peripheral pulses 2+, capillary refill < 3 seconds. No elevation of JVP. No peripheral edema  Gastrointestinal: Abdomen soft and round. Bowel sounds normoactive in all quadrants without tenderness or masses. + Obese  Musculoskeletal: Bilateral upper and lower extremity strength 5/5 with full ROM. Neurological/Psych: Awake and orientated to person, place and time. Calm affect, appropriate mood. Pertinent labs, diagnostic, device, and imaging results reviewed as a part of this visit    LABS    CBC:   Lab Results   Component Value Date    WBC 9.6 10/08/2020    HGB 13.4 (L) 10/08/2020    HCT 40.3 (L) 10/08/2020    MCV 84.0 10/08/2020     10/08/2020     BMP:   Lab Results   Component Value Date    CREATININE 1.2 10/08/2020    BUN 28 (H) 10/08/2020     10/08/2020    K 4.3 10/08/2020    CL 99 10/08/2020    CO2 26 10/08/2020     CrCl cannot be calculated (Patient's most recent lab result is older than the maximum 180 days allowed. ). Thyroid: No results found for: TSH, [id number], Z5EHCHA, THYROIDAB  Lipid Panel:   Lab Results   Component Value Date/Time    CHOL 156 10/05/2020 11:09 AM    HDL 54 10/05/2020 11:09 AM    TRIG 139 10/05/2020 11:09 AM     LFTs:  Lab Results   Component Value Date    ALT 55 (H) 10/05/2020    AST 34 10/05/2020    ALKPHOS 70 10/05/2020    BILITOT 0.6 10/05/2020     Coags:   Lab Results   Component Value Date    PROTIME 22.3 (H) 10/09/2020    INR 1.91 (H) 10/09/2020       EC2023  - Marked sinus bradycardia. Rate 49, , QTc 429    Echo: 3/19  Overall left ventricular ejection fraction is estimated to be 50-55%. The left ventricular function is low normal. There is mild global hypokinesis of the left ventricle. The diastolic function is impaired and classified as Grade 2   (psuedonormalization). The left atrium is moderately dilated. There is mild mitral regurgitation. Right ventricular systolic pressure is indeterminate due to poorly visualized tricuspid regurgitation. LISSA: 10/20   Overall left ventricular function is normal. Mitral valve is structurally normal.   Trivial mitral regurgitation is present. There is no evidence of mass or thrombus in the left atrium or appendage. Tricuspid aortic valve. No evidence of aortic valve regurgitation. GXT:    Normal LV size with hyperdynamic systolic function.     Left ventricular ejection fraction of 73 %. There is normal isotope uptake at stress and rest.    There is no evidence of myocardial ischemia or scar. Assessment:    1. Paroxysmal Atrial Fibrillation   - S/p RFCA with PVI (10/5/20, Dr. Rody Jackson)   - Currently in SB   - Continue metoprolol 12.5 mg BID (unable to tolerate higher dose due to bradycardia/fatigue)  ~ Instructed him to take extra 25 mg at onset of atrial fibrillation and to call if he remains in AF after one dose for further instruction (Told him not to take extra doses if SBP <100 prior to taking)     - [id number] score: 3 (Age, HTN, DM) ; [id number] Vasc score and anticoagulation discussed. High risk for stroke and thromboembolism. Anticoagulation is recommended. Risk of bleeding was discussed. ~ Discussed risks of silent atrial fibrillation, pt aware of risks off anti-coagulation. Pt takes eliquis 5 mg BID PRN. Instructed to start at onset of atrial fibrillation BID and call office if symptoms do not resolve within 24 hours      - Afib risk factors including age, HTN, obesity, inactivity and DANO were discussed with patient. Risk factor modification recommended               ~ TSH 1.87 (10/20)                 - Treatment options including cardioversion, rate control strategy, antiarrhythmics, anticoagulation and possible ablation were discussed with patient. Risks, benefits and alternative of each treatment options were explained. All questions answered     ~ If his burden worsens, will likely need to consider repeat ablation vs PPM as AAD/medication options are limited by his bradycardic HR on low dose metoprolol    2. Implantable Loop Recorder  - S/p ILR insertion  -The CIED was interrogated and programmed and I supervised and reviewed all the data. All findings and changes are in device interrogation sheat and reflect my personal interpretation and changes and is scanned to Epic  - Device shows: NSR/SB. No arrhythmias  - Follow up with device clinic as scheduled    3. Bradycardia   - Chronic low HR   - On low dose BB    - At this point, he denies any significant symptoms from his bradycardia. Instructed to call if he becomes more symptomatic and we will stop his BB. If he continues to remain bradycardic off BB, may need to consider PPM for symptomatic bradycardia. He also will be higher risk for tachycardia/AF RVR over his BB    4. HTN   - Controlled: Goal <130/80   - Continue current medications             - Encouraged to monitor and log BP readings at home, then bring log to next visit   - Discussed importance of low sodium diet, weight control and exercise     5. DANO   - Stable: Uses CPAP   - Encourage to use CPAP to prevent long term effects of untreated DANO     6. Hyperlipidemia   - Controlled. Goal: LDL <100               ~ LDL 74 (10/20); recently checked by PCP, but results unavailable to me   - On lovastatin 40 mg QD   - Discussed diet, weight loss, and exercise needs   - Followed per PCP    7. Obesity  - Body mass index is 34.71 kg/m². - Complicating assessment and treatment. Placing patient at risk for multiple co-morbidities as well as early death and contributing to the patient's presentation  - Discussed weight loss and patient was encouraged to reduce calorie intake and increase exercise    Plan:  1. No changes  2. Call office if any issues    F/U: Follow-up with EP in 6 months  -Follow up with device clinic as scheduled  -Call Lillian Painter at [phone] with any questions    Diet & Exercise: The patient is counseled to follow a low salt diet to assure blood pressure remains controlled for cardiovascular risk factor modification  The patient is counseled to avoid excess caffeine, and energy drinks as this may exacerbated ectopy and arrhythmia  The patient is counseled to lose weight to control cardiovascular risk factors  Exercise program discussed:  To improve overall cardiovascular health, the patient is instructed to increase cardiovascular related activities with a goal of 150 min/week of moderate level activity or 10,000 steps per day. Encouraged to perform as much activity as tolerated    I have addressed the patient's cardiac risk factors and adjusted pharmacologic treatment as needed. In addition, I have reinforced the need for patient directed risk factor modification. I independently reviewed the device check interrogation and ECG    All questions and concerns were addressed with the patient. Alternatives to treatment were discussed. Thank you for allowing to us to participate in the care of Sissy Cough    Time  30 minutes spent preparing to see patient including reviewing patient history/prior tests/prior consults, performing a medical exam, counseling and educating patient/family/caregiver, ordering medications/tests/procedures, referring and communicating with PCPs and other pertinent consultants, documenting information in the EMR, independently interpreting results and communicating to family and coordination of patient care.     Zechariah Gutierrez, DANNIE-CNP  [facility]   Office: [phone]

## 2023-01-03 ENCOUNTER — NURSE ONLY (OUTPATIENT)
Dept: CARDIOLOGY CLINIC | Age: 78
End: 2023-01-03
Payer: MEDICARE

## 2023-01-03 DIAGNOSIS — I48.0 PAF (PAROXYSMAL ATRIAL FIBRILLATION) (HCC): Primary | ICD-10-CM

## 2023-01-03 DIAGNOSIS — Z45.09 ENCOUNTER FOR ELECTRONIC ANALYSIS OF REVEAL EVENT RECORDER: ICD-10-CM

## 2023-01-03 PROCEDURE — G2066 INTER DEVC REMOTE 30D: HCPCS | Performed by: INTERNAL MEDICINE

## 2023-01-03 PROCEDURE — 93298 REM INTERROG DEV EVAL SCRMS: CPT | Performed by: INTERNAL MEDICINE

## 2023-01-04 NOTE — PROGRESS NOTES
We received a remote transmission from patient's monitor at home. Remote Linq report shows no arrhythmias. EP physician to review. We will continue to monitor remotely. Implanted for AF management. Pt is on Eliquis. End of 31-day monitoring period 1-3-23.

## 2023-01-16 ENCOUNTER — OFFICE VISIT (OUTPATIENT)
Dept: PULMONOLOGY | Age: 78
End: 2023-01-16
Payer: MEDICARE

## 2023-01-16 VITALS
HEART RATE: 66 BPM | DIASTOLIC BLOOD PRESSURE: 62 MMHG | SYSTOLIC BLOOD PRESSURE: 136 MMHG | HEIGHT: 67 IN | BODY MASS INDEX: 34.84 KG/M2 | OXYGEN SATURATION: 96 % | WEIGHT: 222 LBS

## 2023-01-16 DIAGNOSIS — I48.0 PAF (PAROXYSMAL ATRIAL FIBRILLATION) (HCC): ICD-10-CM

## 2023-01-16 DIAGNOSIS — E66.01 CLASS 2 SEVERE OBESITY DUE TO EXCESS CALORIES WITH SERIOUS COMORBIDITY AND BODY MASS INDEX (BMI) OF 35.0 TO 35.9 IN ADULT (HCC): ICD-10-CM

## 2023-01-16 DIAGNOSIS — G47.33 OSA (OBSTRUCTIVE SLEEP APNEA): Primary | ICD-10-CM

## 2023-01-16 DIAGNOSIS — R06.02 SHORTNESS OF BREATH: ICD-10-CM

## 2023-01-16 PROCEDURE — G8417 CALC BMI ABV UP PARAM F/U: HCPCS | Performed by: INTERNAL MEDICINE

## 2023-01-16 PROCEDURE — G8427 DOCREV CUR MEDS BY ELIG CLIN: HCPCS | Performed by: INTERNAL MEDICINE

## 2023-01-16 PROCEDURE — G8484 FLU IMMUNIZE NO ADMIN: HCPCS | Performed by: INTERNAL MEDICINE

## 2023-01-16 PROCEDURE — 3078F DIAST BP <80 MM HG: CPT | Performed by: INTERNAL MEDICINE

## 2023-01-16 PROCEDURE — 3075F SYST BP GE 130 - 139MM HG: CPT | Performed by: INTERNAL MEDICINE

## 2023-01-16 PROCEDURE — 1036F TOBACCO NON-USER: CPT | Performed by: INTERNAL MEDICINE

## 2023-01-16 PROCEDURE — 1123F ACP DISCUSS/DSCN MKR DOCD: CPT | Performed by: INTERNAL MEDICINE

## 2023-01-16 PROCEDURE — 99214 OFFICE O/P EST MOD 30 MIN: CPT | Performed by: INTERNAL MEDICINE

## 2023-01-16 RX ORDER — ALBUTEROL SULFATE 90 UG/1
2 AEROSOL, METERED RESPIRATORY (INHALATION) 4 TIMES DAILY PRN
Qty: 18 G | Refills: 5 | Status: SHIPPED | OUTPATIENT
Start: 2023-01-16

## 2023-01-16 ASSESSMENT — SLEEP AND FATIGUE QUESTIONNAIRES
HOW LIKELY ARE YOU TO NOD OFF OR FALL ASLEEP WHILE SITTING AND TALKING TO SOMEONE: 0
ESS TOTAL SCORE: 5
HOW LIKELY ARE YOU TO NOD OFF OR FALL ASLEEP WHILE LYING DOWN TO REST IN THE AFTERNOON WHEN CIRCUMSTANCES PERMIT: 2
HOW LIKELY ARE YOU TO NOD OFF OR FALL ASLEEP WHILE WATCHING TV: 2
HOW LIKELY ARE YOU TO NOD OFF OR FALL ASLEEP WHEN YOU ARE A PASSENGER IN A CAR FOR AN HOUR WITHOUT A BREAK: 0
HOW LIKELY ARE YOU TO NOD OFF OR FALL ASLEEP WHILE SITTING QUIETLY AFTER LUNCH WITHOUT ALCOHOL: 0
HOW LIKELY ARE YOU TO NOD OFF OR FALL ASLEEP IN A CAR, WHILE STOPPED FOR A FEW MINUTES IN TRAFFIC: 0
HOW LIKELY ARE YOU TO NOD OFF OR FALL ASLEEP WHILE SITTING AND READING: 1
HOW LIKELY ARE YOU TO NOD OFF OR FALL ASLEEP WHILE SITTING INACTIVE IN A PUBLIC PLACE: 0

## 2023-01-16 NOTE — PROGRESS NOTES
PULMONARY OFFICE FOLLOW UP NOTE    REASON FOR VISIT:   Chief Complaint   Patient presents with    Sleep Apnea         DATE OF VISIT: 1/16/2023     HISTORY OF PRESENT ILLNESS: 68y.o. year old male comes into the pulmonary office for a follow-up. Patient reports that 2 weeks ago he was suffering with upper respiratory tract infection which led to bronchitis. He received 5-day course of antibiotics with which his symptoms eventually subsided. He denies any fevers or night sweats. No discolored expectoration. He has not used his albuterol. Uses his CPAP therapy regularly and feels benefited from it. Denies any mask leakage or pressure intolerance. Sleeping consistently through the night and has less daytime symptoms. Previously: Patient has been using his CPAP therapy regularly and denies any new issues. No mask leakage or pressure intolerance. Has been able to lose some weight and has been going to gymnasium regularly. Scheduled to undergo replacement of his loop recorder in the near future. Had only one episode of atrial fibrillation few months ago. Denies any shortness of breath with exertion. Fairly active. No nasal symptoms or cough. Patient reports that his breathing has been stable and he denies any new symptoms. His atrial fibrillation is better controlled now. Has been using his CPAP therapy regularly and feels benefited from it. No mask leakage or pressure intolerance. Weight has been stable. Patient is going to a dietitian. Patient was recently admitted to the hospital with atrial fibrillation and required elective cardiac ablation on October 5, 2020. Post ablation patient was seen to have respiratory distress and chest imaging showed right upper lobe infiltrate. Patient was treated as community-acquired pneumonia. Also seen to have hypoxic respiratory failure requiring oxygen supplementation for a few days.   At the time of discharge patient did not require any oxygen supplementation. Today comes in for a follow-up reporting that his breathing is much improved. He does have occasional cough which gets exacerbated when he is talking for extended period of time. His exercise tolerance is slowly improving. His heart rate fluctuates between high 40s to high 70s. Even when he is exercising strenuously it does not go above 70s. Patient is on metoprolol 50 mg twice a day. Is currently on hydrochlorothiazide. Does have mild pedal edema. Has been using his CPAP machine regularly. Denies any mask leakage or pressure intolerance. Sleep Medicine 1/16/2023 6/8/2022 12/1/2021   Sitting and reading 1 0 0   Watching TV 2 0 2   Sitting, inactive in a public place (e.g. a theatre or a meeting) 0 0 0   As a passenger in a car for an hour without a break 0 0 0   Lying down to rest in the afternoon when circumstances permit 2 1 2   Sitting and talking to someone 0 0 0   Sitting quietly after a lunch without alcohol 0 0 0   In a car, while stopped for a few minutes in traffic 0 0 0   [place] Sleepiness Score 5 1 4         REVIEW OF SYSTEMS: 14 point ROS is negative beside mentioned in [address] PAST MEDICAL HISTORY:   Past Medical History:   Diagnosis Date    A-fib ([facility] [address]     Clotting disorder ([facility] [address]     Diabetes mellitus ([facility] [address]     Hyperlipidemia     Hypertension        PAST SURGICAL HISTORY: No past surgical history on file. SOCIAL HISTORY:   Social History     Tobacco Use    Smoking status: Never    Smokeless tobacco: Never   Vaping Use    Vaping Use: Never used   Substance Use Topics    Alcohol use:  Yes     Alcohol/week: 3.0 standard drinks     Types: 3 Cans of beer per week    Drug use: Never       FAMILY HISTORY:   Family History   Problem Relation Age of Onset    Arrhythmia Mother     High Cholesterol Father     Hypertension Father     Heart Attack Maternal Uncle        MEDICATIONS:     Current Outpatient Medications on File Prior to Visit   Medication Sig Dispense Refill ELIQUIS 5 MG TABS tablet Take 1 tablet by mouth twice daily 60 tablet 5    metoprolol tartrate (LOPRESSOR) 25 MG tablet Take 0.5 tablets by mouth 2 times daily Ok to take extra 25 mg of metoprolol at onset of atrial fibrillation if blood pressure >100 90 tablet 3    amLODIPine (NORVASC) 10 MG tablet Take 1 tablet by mouth once daily 90 tablet 3    losartan (COZAAR) 100 MG tablet Take 1 tablet by mouth daily 90 tablet 3    fluticasone (FLONASE) 50 MCG/ACT nasal spray 1 spray by Each Nostril route daily      allopurinol (ZYLOPRIM) 300 MG tablet Take 1 tablet by mouth daily      metFORMIN (GLUCOPHAGE-XR) 500 MG extended release tablet Take 1 tablet by mouth daily      hydrochlorothiazide (HYDRODIURIL) 25 MG tablet Take 1 tablet by mouth daily      lovastatin (MEVACOR) 40 MG tablet Take 40 mg by mouth daily      tamsulosin (FLOMAX) 0.4 MG capsule Take 0.4 mg by mouth daily      aspirin 81 MG chewable tablet Take 1 mg by mouth daily      Specialty Vitamins Products (PROSTATE) TABS Take 2 daily      Multiple Vitamin (ONE-A-DAY MENS) TABS Take 1 tablet by mouth daily      NONFORMULARY Nuretin: takes 2 daily      Multiple Vitamins-Minerals (ICAPS AREDS 2) CAPS Take by mouth 2 daily      [DISCONTINUED] amLODIPine (NORVASC) 10 MG tablet Take 1 tablet by mouth daily 90 tablet 1     No current facility-administered medications on file prior to visit. ALLERGIES:   Allergies as of 01/16/2023 - Fully Reviewed 01/16/2023   Allergen Reaction Noted    Amoxicillin Rash 04/17/2019      OBJECTIVE:   height is 5' 7\" (1.702 m) and weight is 222 lb (100.7 kg). His blood pressure is 136/62 and his pulse is 66. His oxygen saturation is 96%. PHYSICAL EXAM:    CONSTITUTIONAL: He is a 68y.o.-year-old who appears his stated age. He is alert and oriented x 3 and in no acute distress. HEENT: PERRL. No scleral icterus. No thrush, atraumatic, normocephalic.   NECK: Supple, without cervical or supraclavicular lymphadenopathy:  CARDIOVASCULAR: S1 S2 RRR. Without murmer  RESPIRATORY & CHEST: Lungs are clear to auscultation and percussion. No wheezing, no crackles. Good air movement  GASTROINTESTINAL & ABDOMEN: Soft, nontender, positive bowel sounds in all quadrants, non-distended, without hepatosplenomegaly. GENITOURINARY: Deferred. MUSCULOSKELETAL: No tenderness to palpation of the axial skeleton. There is no clubbing. No cyanosis. No edema of the lower extremities. SKIN OF BODY: No rash or jaundice. PSYCHIATRIC EVALUATION: Normal affect. Patient answers questions appropriately. HEMATOLOGIC/LYMPHATIC/ IMMUNOLOGIC: No palpable lymphadenopathy. NEUROLOGIC: Alert and oriented x 3. Groslly non-focal. Motor strength is 5+/5 in all muscle groups. The patient has a normal sensorium globally. Labs:    No flowsheet data found. All labs were personally reviewed by me any my interpretation is: CBC is mild anemia. Chem 8 is normal.     IMAGING:    No new chest imaging for me to evaluate. Pulmonary Functions Testing Results:    Complete PFT done on 5/26/2021  FEV1: 2.46/25  FVC: 3.09/86  FEV1/FVC: 79  Total lung capacity: 111%  Residual volume: 115%  Diffusion capacity: 149%  Airway resistance: 124%    This complete PFT was personally reviewed by me and my interpretation is: Spirometry values are within normal limits. Normal total lung capacity and residual volume with increased diffusion capacity. Airway resistance is increased. Impression: Possibly very mild obstructive airway disease.     CPAP compliance summary     7/16/2022 -1/11/2023 12/5/2021-6/2/2022 8/31/2021-11/28/2021 3/11/2021-6/8/2021   Days with device usage 178/180 days  180/180 days  90/90 days  88/90 days   Average Usage 7 hours 17 minutes  7 hours 25 minutes  7 hours 26 minutes  7 hours 24 minutes   Days with device usage >4hrs 96%  98%  100%  98%   Large Leak per night 11.9 L/min  9.7 L/min  10.9 L/min  12.1 L/min   AHI 1.2  1.4  1.5  1.6 CPAP settings 7 cm H2O  7 cmH2O  7 cmH2O  7 cmH2O   90th percentile pressure - - - -   Mask Fullface mask  fullface mask  full facemask  fullface mask     DME: Medical service company      IMPRESSION AND RECOMMENDATIONS:     1. Shortness of breath  -Patient has shortness of breath most likely due to a combination of factors including paroxysmal atrial fibrillation, morbid obesity and sleep apnea. -Cannot completely rule out an obstructive airway disease. Patient has been a non-smoker and no occupational exposure to dust, smoke or fumes.  -Complete PFT does not show any obstructive airway disease. No indications for long-acting beta-2 agonist or long-acting muscarinic agents.    -He can continue using albuterol as needed. I will refill this inhaler today. 2. DANO on CPAP  -I personally reviewed patient's CPAP download. His residual AHI is within normal limits and compliance remains adequate.  -No changes made to the pressure settings today. -Advised patient to change his mask interface regularly to maintain proper seal.    3. Paroxysmal atrial fibrillation ([address]  -As per cardiology services.  -Patient is on metoprolol 50 mg twice a day. -He will benefit with continued diuretic usage. Currently on hydrochlorothiazide. If starts to have more pedal edema, may need to be switched over to furosemide.  -Has been started on Eliquis. 4. Class 2 severe obesity due to excess calories with serious comorbidity and body mass index (BMI) of 37.0 to 37.9 in adult [facility])  -I strongly advised the patient to make efforts to lose weight. I discussed various modalities including moderate intensity intermittent exercises, diet control and bariatric surgery. If the patient loses even 10 to 15% of current body weight, it will be beneficial in improving the overall health. I spent 15 minutes in counseling the patient regarding medical nutrition therapy. Return in about 6 months (around 7/16/2023) for dano. Dennise Velasco MD  Pulmonary Critical Care and Sleep Medicine  1/16/2023, 10:01 AM    This note was completed using dragon medical speech recognition software. Grammatical errors, random word insertions, pronoun errors and incomplete sentences are occasional consequences of this technology due to software limitations. If there are questions or concerns about the content of this note of information contained within the body of this dictation they should be addressed with the provider for clarification.

## 2023-01-18 ENCOUNTER — NURSE ONLY (OUTPATIENT)
Dept: CARDIOLOGY CLINIC | Age: 78
End: 2023-01-18

## 2023-01-18 ENCOUNTER — OFFICE VISIT (OUTPATIENT)
Dept: CARDIOLOGY CLINIC | Age: 78
End: 2023-01-18
Payer: MEDICARE

## 2023-01-18 VITALS
SYSTOLIC BLOOD PRESSURE: 132 MMHG | HEART RATE: 57 BPM | BODY MASS INDEX: 34.78 KG/M2 | OXYGEN SATURATION: 97 % | DIASTOLIC BLOOD PRESSURE: 62 MMHG | WEIGHT: 221.6 LBS | HEIGHT: 67 IN

## 2023-01-18 DIAGNOSIS — I10 ESSENTIAL HYPERTENSION: ICD-10-CM

## 2023-01-18 DIAGNOSIS — Z95.818 STATUS POST PLACEMENT OF IMPLANTABLE LOOP RECORDER: ICD-10-CM

## 2023-01-18 DIAGNOSIS — I48.0 PAF (PAROXYSMAL ATRIAL FIBRILLATION) (HCC): Primary | ICD-10-CM

## 2023-01-18 DIAGNOSIS — E66.01 CLASS 2 SEVERE OBESITY DUE TO EXCESS CALORIES WITH SERIOUS COMORBIDITY AND BODY MASS INDEX (BMI) OF 35.0 TO 35.9 IN ADULT (HCC): ICD-10-CM

## 2023-01-18 DIAGNOSIS — Z45.09 ENCOUNTER FOR ELECTRONIC ANALYSIS OF REVEAL EVENT RECORDER: ICD-10-CM

## 2023-01-18 DIAGNOSIS — R00.1 BRADYCARDIA: ICD-10-CM

## 2023-01-18 PROCEDURE — G8427 DOCREV CUR MEDS BY ELIG CLIN: HCPCS | Performed by: NURSE PRACTITIONER

## 2023-01-18 PROCEDURE — 93000 ELECTROCARDIOGRAM COMPLETE: CPT | Performed by: NURSE PRACTITIONER

## 2023-01-18 PROCEDURE — 1123F ACP DISCUSS/DSCN MKR DOCD: CPT | Performed by: NURSE PRACTITIONER

## 2023-01-18 PROCEDURE — 3078F DIAST BP <80 MM HG: CPT | Performed by: NURSE PRACTITIONER

## 2023-01-18 PROCEDURE — G8417 CALC BMI ABV UP PARAM F/U: HCPCS | Performed by: NURSE PRACTITIONER

## 2023-01-18 PROCEDURE — G8484 FLU IMMUNIZE NO ADMIN: HCPCS | Performed by: NURSE PRACTITIONER

## 2023-01-18 PROCEDURE — 1036F TOBACCO NON-USER: CPT | Performed by: NURSE PRACTITIONER

## 2023-01-18 PROCEDURE — 3075F SYST BP GE 130 - 139MM HG: CPT | Performed by: NURSE PRACTITIONER

## 2023-01-18 PROCEDURE — 99214 OFFICE O/P EST MOD 30 MIN: CPT | Performed by: NURSE PRACTITIONER

## 2023-01-18 NOTE — PROGRESS NOTES
Patient comes in for their OV with NPSR. S/p mdtlinq II Implant on 10/12/2022. Carelink Interrogation shows a Battery Status GOOD. No episodes noted. Since 10/19/2022 0.3% PVC burden. Implanted for AF management. Patient remains Eliquis and metoprolol. Please see interrogation for more detail. We will continue to follow the Patient remotely.

## 2023-02-07 ENCOUNTER — NURSE ONLY (OUTPATIENT)
Dept: CARDIOLOGY CLINIC | Age: 78
End: 2023-02-07
Payer: MEDICARE

## 2023-02-07 DIAGNOSIS — I48.0 PAF (PAROXYSMAL ATRIAL FIBRILLATION) (HCC): Primary | ICD-10-CM

## 2023-02-07 DIAGNOSIS — Z45.09 ENCOUNTER FOR ELECTRONIC ANALYSIS OF REVEAL EVENT RECORDER: ICD-10-CM

## 2023-02-07 PROCEDURE — G2066 INTER DEVC REMOTE 30D: HCPCS | Performed by: INTERNAL MEDICINE

## 2023-02-07 PROCEDURE — 93298 REM INTERROG DEV EVAL SCRMS: CPT | Performed by: INTERNAL MEDICINE

## 2023-03-01 ENCOUNTER — TELEPHONE (OUTPATIENT)
Dept: CARDIOLOGY CLINIC | Age: 78
End: 2023-03-01

## 2023-03-01 NOTE — TELEPHONE ENCOUNTER
CARDIAC CLEARANCE     What type of procedure are you having? Pt is having Prostate Biopsy  Which physician is performing your procedure? Dr Donaldo Velázquez     When is your procedure scheduled for?  3/8/2023  Where are you having this procedure? [address] [place] [address]  Are you taking Blood Thinners? If so what? Eliquis 5 mg BID  81 mg Asprin 1 QD    Does the surgeon want you to stop your blood thinner?    Eliquis 3 days  Asprin 7 days   Phone Number and Contact Name for Physicians office:  [phone]  Fax number to send information:  Pt did not have Fax #              I

## 2023-03-13 NOTE — PROGRESS NOTES
We received a remote transmission from patient's monitor at home. Remote Linq report shows no arrhythmias. EP physician to review. We will continue to monitor remotely. Implanted for AF management. Pt is on Eliquis. End of 31-day monitoring period 3-14-23.

## 2023-03-14 ENCOUNTER — NURSE ONLY (OUTPATIENT)
Dept: CARDIOLOGY CLINIC | Age: 78
End: 2023-03-14
Payer: MEDICARE

## 2023-03-14 DIAGNOSIS — Z45.09 ENCOUNTER FOR ELECTRONIC ANALYSIS OF REVEAL EVENT RECORDER: ICD-10-CM

## 2023-03-14 DIAGNOSIS — I48.0 PAF (PAROXYSMAL ATRIAL FIBRILLATION) (HCC): Primary | ICD-10-CM

## 2023-03-14 PROCEDURE — G2066 INTER DEVC REMOTE 30D: HCPCS | Performed by: INTERNAL MEDICINE

## 2023-03-14 PROCEDURE — 93298 REM INTERROG DEV EVAL SCRMS: CPT | Performed by: INTERNAL MEDICINE

## 2023-04-17 NOTE — PROGRESS NOTES
We received a remote transmission from patient's monitor at home. Remote Linq report shows no arrhythmias. EP physician to review. We will continue to monitor remotely. Implanted for AF management. Pt is on Eliquis. End of 31-day monitoring period 4-18-23.

## 2023-04-18 ENCOUNTER — NURSE ONLY (OUTPATIENT)
Dept: CARDIOLOGY CLINIC | Age: 78
End: 2023-04-18

## 2023-04-18 DIAGNOSIS — Z45.09 ENCOUNTER FOR ELECTRONIC ANALYSIS OF REVEAL EVENT RECORDER: ICD-10-CM

## 2023-04-18 DIAGNOSIS — I48.0 PAF (PAROXYSMAL ATRIAL FIBRILLATION) (HCC): Primary | ICD-10-CM

## 2023-05-04 RX ORDER — AMLODIPINE BESYLATE 10 MG/1
TABLET ORAL
Qty: 90 TABLET | Refills: 0 | Status: SHIPPED | OUTPATIENT
Start: 2023-05-04

## 2023-05-10 ENCOUNTER — TELEPHONE (OUTPATIENT)
Dept: CARDIOLOGY CLINIC | Age: 78
End: 2023-05-10

## 2023-05-10 NOTE — TELEPHONE ENCOUNTER
Pt called to inform npsr that he has been in a-fib for 3 days now. Pt is wanting to know what to do are should he schedule an appt to be seen. Please call to discuss. Please advise .   Thank you

## 2023-05-11 NOTE — TELEPHONE ENCOUNTER
Patient has [employer] [id number] and automatically transmits every night. We received remote transmission from patient's monitor at home. Current ECG shows AF-trends shows he has been back in AF since ~ 5/7/2023   On Eliquis. EP will review. See interrogation under cardiology tab in the [address] [place] [address] [address] field for more details. Please advise.  Thanks

## 2023-05-11 NOTE — TELEPHONE ENCOUNTER
I spoke to pt, When I asked him to send a transmission, he said he could not find the option on the lukas to send message. Pt will need help sending a transmission to us. He is also feeling SOB with activity and feeling more tired than usual. HR were running in the lower 100s on his watch.

## 2023-05-11 NOTE — TELEPHONE ENCOUNTER
Discussed with patient if he is taking his Eliquis. He has been taking consistently for about 6-8 months. He is agreeable to proceeding with a DCCV.  Letter sent to scheduling

## 2023-05-12 ENCOUNTER — TELEPHONE (OUTPATIENT)
Dept: CARDIOLOGY CLINIC | Age: 78
End: 2023-05-12

## 2023-05-12 NOTE — TELEPHONE ENCOUNTER
Spoke with the patient and got him scheduled for procedure. We went over instructions below and he verbalized understanding.      Procedure - CV   Date: 5/17/2023  Arrival time: 10:30 am   Procedure time: 11:30 am     Patient Instructions  Medication Instructions:DO NOT HOLD ANY MEDICATIONS   Do not eat or drink after midnight the night prior to procedure [x] Yes [] N        Qgenda updated / Added in [facility] / Emailed cath lab

## 2023-05-17 ENCOUNTER — HOSPITAL ENCOUNTER (OUTPATIENT)
Dept: CARDIAC CATH/INVASIVE PROCEDURES | Age: 78
Discharge: HOME | End: 2023-05-17
Attending: INTERNAL MEDICINE | Admitting: INTERNAL MEDICINE
Payer: MEDICARE

## 2023-05-17 LAB
EKG ATRIAL RATE: 54 BPM
EKG DIAGNOSIS: NORMAL
EKG P AXIS: 40 DEGREES
EKG P-R INTERVAL: 170 MS
EKG Q-T INTERVAL: 446 MS
EKG QRS DURATION: 106 MS
EKG QTC CALCULATION (BAZETT): 422 MS
EKG R AXIS: 46 DEGREES
EKG T AXIS: 34 DEGREES
EKG VENTRICULAR RATE: 54 BPM

## 2023-05-17 PROCEDURE — 93005 ELECTROCARDIOGRAM TRACING: CPT | Performed by: INTERNAL MEDICINE

## 2023-05-17 RX ORDER — SODIUM CHLORIDE 0.9 % (FLUSH) 0.9 %
5-40 SYRINGE (ML) INJECTION PRN
Status: DISCONTINUED | OUTPATIENT
Start: 2023-05-17 | End: 2023-05-17 | Stop reason: HOSPADM

## 2023-05-22 NOTE — PROGRESS NOTES
We received a remote transmission from patient's monitor at home. Remote Linq report shows no arrhythmias. EP physician to review. We will continue to monitor remotely. Implanted for AF management. Pt is on Eliquis. End of 31-day monitoring period 5-23-23.

## 2023-05-23 ENCOUNTER — NURSE ONLY (OUTPATIENT)
Dept: CARDIOLOGY CLINIC | Age: 78
End: 2023-05-23
Payer: MEDICARE

## 2023-05-23 DIAGNOSIS — Z45.09 ENCOUNTER FOR ELECTRONIC ANALYSIS OF REVEAL EVENT RECORDER: ICD-10-CM

## 2023-05-23 DIAGNOSIS — I48.0 PAF (PAROXYSMAL ATRIAL FIBRILLATION) (HCC): Primary | ICD-10-CM

## 2023-05-23 PROCEDURE — 93298 REM INTERROG DEV EVAL SCRMS: CPT | Performed by: INTERNAL MEDICINE

## 2023-05-23 PROCEDURE — G2066 INTER DEVC REMOTE 30D: HCPCS | Performed by: INTERNAL MEDICINE

## 2023-06-27 ENCOUNTER — NURSE ONLY (OUTPATIENT)
Dept: CARDIOLOGY CLINIC | Age: 78
End: 2023-06-27
Payer: MEDICARE

## 2023-06-27 DIAGNOSIS — Z45.09 ENCOUNTER FOR ELECTRONIC ANALYSIS OF REVEAL EVENT RECORDER: ICD-10-CM

## 2023-06-27 DIAGNOSIS — I48.0 PAF (PAROXYSMAL ATRIAL FIBRILLATION) (HCC): Primary | ICD-10-CM

## 2023-06-27 PROCEDURE — 93298 REM INTERROG DEV EVAL SCRMS: CPT | Performed by: INTERNAL MEDICINE

## 2023-06-27 PROCEDURE — G2066 INTER DEVC REMOTE 30D: HCPCS | Performed by: INTERNAL MEDICINE

## 2023-07-05 ENCOUNTER — NURSE ONLY (OUTPATIENT)
Dept: CARDIOLOGY CLINIC | Age: 78
End: 2023-07-05

## 2023-07-05 ENCOUNTER — OFFICE VISIT (OUTPATIENT)
Dept: CARDIOLOGY CLINIC | Age: 78
End: 2023-07-05
Payer: MEDICARE

## 2023-07-05 VITALS
WEIGHT: 218.2 LBS | HEART RATE: 53 BPM | SYSTOLIC BLOOD PRESSURE: 152 MMHG | OXYGEN SATURATION: 97 % | HEIGHT: 67 IN | DIASTOLIC BLOOD PRESSURE: 78 MMHG | BODY MASS INDEX: 34.25 KG/M2

## 2023-07-05 DIAGNOSIS — I48.0 PAF (PAROXYSMAL ATRIAL FIBRILLATION) (HCC): Primary | ICD-10-CM

## 2023-07-05 DIAGNOSIS — I10 ESSENTIAL HYPERTENSION: ICD-10-CM

## 2023-07-05 DIAGNOSIS — Z45.09 ENCOUNTER FOR ELECTRONIC ANALYSIS OF REVEAL EVENT RECORDER: ICD-10-CM

## 2023-07-05 DIAGNOSIS — R06.09 DOE (DYSPNEA ON EXERTION): ICD-10-CM

## 2023-07-05 DIAGNOSIS — E66.01 CLASS 2 SEVERE OBESITY DUE TO EXCESS CALORIES WITH SERIOUS COMORBIDITY AND BODY MASS INDEX (BMI) OF 35.0 TO 35.9 IN ADULT (HCC): ICD-10-CM

## 2023-07-05 PROCEDURE — G8427 DOCREV CUR MEDS BY ELIG CLIN: HCPCS | Performed by: INTERNAL MEDICINE

## 2023-07-05 PROCEDURE — 99214 OFFICE O/P EST MOD 30 MIN: CPT | Performed by: INTERNAL MEDICINE

## 2023-07-05 PROCEDURE — 1123F ACP DISCUSS/DSCN MKR DOCD: CPT | Performed by: INTERNAL MEDICINE

## 2023-07-05 PROCEDURE — 3077F SYST BP >= 140 MM HG: CPT | Performed by: INTERNAL MEDICINE

## 2023-07-05 PROCEDURE — 3078F DIAST BP <80 MM HG: CPT | Performed by: INTERNAL MEDICINE

## 2023-07-05 PROCEDURE — 1036F TOBACCO NON-USER: CPT | Performed by: INTERNAL MEDICINE

## 2023-07-05 PROCEDURE — G8417 CALC BMI ABV UP PARAM F/U: HCPCS | Performed by: INTERNAL MEDICINE

## 2023-07-05 NOTE — PROGRESS NOTES
Patient comes in for their OV with Dr. Gerry Reilly. S/p Medtronic [id number] LINQ II Implant on 10/12/2022. [facility] Interrogation shows a Battery Status GOOD. No episodes noted. Since 10/12/2022 AF with a 1.8%. 23 episodes noted. Last on 5/11/2023, longest 84 1/2 hours. <1% PVC burden. SB at 57 bpm. Implanted for AF management. Patient remains Eliquis and metoprolol. Please see interrogation for more detail. We will continue to follow the Patient remotely.

## 2023-07-13 ENCOUNTER — PROCEDURE VISIT (OUTPATIENT)
Dept: CARDIOLOGY CLINIC | Age: 78
End: 2023-07-13
Payer: MEDICARE

## 2023-07-13 DIAGNOSIS — R06.09 DOE (DYSPNEA ON EXERTION): ICD-10-CM

## 2023-07-13 DIAGNOSIS — I48.0 PAF (PAROXYSMAL ATRIAL FIBRILLATION) (HCC): ICD-10-CM

## 2023-07-13 LAB
LV EF: 60 %
LVEF MODALITY: NORMAL

## 2023-07-13 PROCEDURE — 93306 TTE W/DOPPLER COMPLETE: CPT | Performed by: INTERNAL MEDICINE

## 2023-07-31 ENCOUNTER — OFFICE VISIT (OUTPATIENT)
Dept: PULMONOLOGY | Age: 78
End: 2023-07-31
Payer: MEDICARE

## 2023-07-31 VITALS
WEIGHT: 218.8 LBS | HEIGHT: 67 IN | DIASTOLIC BLOOD PRESSURE: 72 MMHG | OXYGEN SATURATION: 97 % | HEART RATE: 54 BPM | BODY MASS INDEX: 34.34 KG/M2 | SYSTOLIC BLOOD PRESSURE: 144 MMHG

## 2023-07-31 DIAGNOSIS — G47.33 OSA (OBSTRUCTIVE SLEEP APNEA): ICD-10-CM

## 2023-07-31 DIAGNOSIS — I48.0 PAF (PAROXYSMAL ATRIAL FIBRILLATION) (HCC): ICD-10-CM

## 2023-07-31 DIAGNOSIS — E66.01 CLASS 2 SEVERE OBESITY DUE TO EXCESS CALORIES WITH SERIOUS COMORBIDITY AND BODY MASS INDEX (BMI) OF 35.0 TO 35.9 IN ADULT (HCC): ICD-10-CM

## 2023-07-31 DIAGNOSIS — R06.02 SHORTNESS OF BREATH: Primary | ICD-10-CM

## 2023-07-31 PROCEDURE — 99214 OFFICE O/P EST MOD 30 MIN: CPT | Performed by: INTERNAL MEDICINE

## 2023-07-31 PROCEDURE — 1123F ACP DISCUSS/DSCN MKR DOCD: CPT | Performed by: INTERNAL MEDICINE

## 2023-07-31 PROCEDURE — 3078F DIAST BP <80 MM HG: CPT | Performed by: INTERNAL MEDICINE

## 2023-07-31 PROCEDURE — G8417 CALC BMI ABV UP PARAM F/U: HCPCS | Performed by: INTERNAL MEDICINE

## 2023-07-31 PROCEDURE — 1036F TOBACCO NON-USER: CPT | Performed by: INTERNAL MEDICINE

## 2023-07-31 PROCEDURE — 3077F SYST BP >= 140 MM HG: CPT | Performed by: INTERNAL MEDICINE

## 2023-07-31 PROCEDURE — G8428 CUR MEDS NOT DOCUMENT: HCPCS | Performed by: INTERNAL MEDICINE

## 2023-07-31 ASSESSMENT — SLEEP AND FATIGUE QUESTIONNAIRES
HOW LIKELY ARE YOU TO NOD OFF OR FALL ASLEEP IN A CAR, WHILE STOPPED FOR A FEW MINUTES IN TRAFFIC: 0
ESS TOTAL SCORE: 3
HOW LIKELY ARE YOU TO NOD OFF OR FALL ASLEEP WHEN YOU ARE A PASSENGER IN A CAR FOR AN HOUR WITHOUT A BREAK: 0
HOW LIKELY ARE YOU TO NOD OFF OR FALL ASLEEP WHILE SITTING INACTIVE IN A PUBLIC PLACE: 0
HOW LIKELY ARE YOU TO NOD OFF OR FALL ASLEEP WHILE LYING DOWN TO REST IN THE AFTERNOON WHEN CIRCUMSTANCES PERMIT: 1
HOW LIKELY ARE YOU TO NOD OFF OR FALL ASLEEP WHILE SITTING AND TALKING TO SOMEONE: 0
HOW LIKELY ARE YOU TO NOD OFF OR FALL ASLEEP WHILE WATCHING TV: 0
HOW LIKELY ARE YOU TO NOD OFF OR FALL ASLEEP WHILE SITTING QUIETLY AFTER LUNCH WITHOUT ALCOHOL: 0
HOW LIKELY ARE YOU TO NOD OFF OR FALL ASLEEP WHILE SITTING AND READING: 2

## 2023-07-31 NOTE — PROGRESS NOTES
PULMONARY OFFICE FOLLOW UP NOTE    REASON FOR VISIT:   Chief Complaint   Patient presents with    Sleep Apnea         DATE OF VISIT: 7/31/2023     HISTORY OF PRESENT ILLNESS: 68y.o. year old male comes into the pulmonary office for a follow-up. Continues to report stable breathing without any new symptoms. Has been monitoring his weight routinely. Regularly takes hydrochlorothiazide and occasionally when his weight has gone up he takes Lasix for few days. His breathing has been stable. Has not required to use albuterol. Uses his CPAP therapy regularly. Feels benefited from it. Denies any mask leakage or pressure intolerance. Weight remains stable. Has had few episodes of bradycardia and his metoprolol dosage has been cut back. Previously: Patient reports that 2 weeks ago he was suffering with upper respiratory tract infection which led to bronchitis. He received 5-day course of antibiotics with which his symptoms eventually subsided. He denies any fevers or night sweats. No discolored expectoration. He has not used his albuterol. Uses his CPAP therapy regularly and feels benefited from it. Denies any mask leakage or pressure intolerance. Sleeping consistently through the night and has less daytime symptoms. Patient has been using his CPAP therapy regularly and denies any new issues. No mask leakage or pressure intolerance. Has been able to lose some weight and has been going to gymnasium regularly. Scheduled to undergo replacement of his loop recorder in the near future. Had only one episode of atrial fibrillation few months ago. Denies any shortness of breath with exertion. Fairly active. No nasal symptoms or cough. Patient reports that his breathing has been stable and he denies any new symptoms. His atrial fibrillation is better controlled now. Has been using his CPAP therapy regularly and feels benefited from it. No mask leakage or pressure intolerance.   Weight has been

## 2023-08-02 RX ORDER — AMLODIPINE BESYLATE 10 MG/1
TABLET ORAL
Qty: 90 TABLET | Refills: 3 | Status: SHIPPED | OUTPATIENT
Start: 2023-08-02

## 2023-08-04 PROCEDURE — G2066 INTER DEVC REMOTE 30D: HCPCS | Performed by: INTERNAL MEDICINE

## 2023-08-04 PROCEDURE — 93298 REM INTERROG DEV EVAL SCRMS: CPT | Performed by: INTERNAL MEDICINE

## 2023-08-09 ENCOUNTER — TELEPHONE (OUTPATIENT)
Dept: CARDIOLOGY CLINIC | Age: 78
End: 2023-08-09

## 2023-08-09 ENCOUNTER — NURSE ONLY (OUTPATIENT)
Dept: CARDIOLOGY CLINIC | Age: 78
End: 2023-08-09

## 2023-08-09 RX ORDER — PROPAFENONE HYDROCHLORIDE 150 MG/1
150 TABLET, COATED ORAL 2 TIMES DAILY
Qty: 60 TABLET | Refills: 3 | Status: SHIPPED | OUTPATIENT
Start: 2023-08-09

## 2023-08-09 NOTE — TELEPHONE ENCOUNTER
LVM for patient to return call to get scheduled for procedure. The pt was still in office. The RN is going to call and give him procedure information below. Procedure - CV   Date: 8/10/2023   Arrival time: 11:00 am  Procedure time: 12:00 am     Patient Instructions  Dx:atrial flutter/fib                    ICD-10 code: P53171  Do not hold blood thinner   Do not eat or drink after midnight the night prior to procedure [x] Yes [] No     You will need a .         Qgenda updated / Added in [place] / Emailed cath lab

## 2023-08-09 NOTE — TELEPHONE ENCOUNTER
Patient is interested in pursuing ablation. Discussed with [facility] - okay to schedule. Scheduling letter sent.

## 2023-08-09 NOTE — TELEPHONE ENCOUNTER
Spoke with patient . He has been mildly congested for a couple of days. His heart rate was low this morning. He states he did take his \" blood pressure pills\". Does not differentiate lopressor when specifically asked if he took it today - stating agin that he took his blood pressure pills. He  states his /84. Pulse is 107. Added to cardio schedule for 2 pm for carelink express. States he was told his medtronic linq on his phone is not working and he was told to call medtronic to correct. He has not done this yet. Adivsed if symptoms are severe to go to ER. Otherwise we will see him at 2pm for carelink express.

## 2023-08-09 NOTE — PROGRESS NOTES
Patient presents today  with his daughter with c/o irregular heart rate for last two days. States his heart rate was 40 this morning and he felt light headed like he may pass out. ILR report shows he is in atrial flutter. Reviewed with Dr. Ellen Martinez. Stop metoprolol, start propafenone 150 BID. Reviewed administration instructions. Schedule cardioversion He verbalized understanding. Revisited discussion of possible pacemaker to allow for rate control medication  and possible ablation. Patient is interested in pursing ablation. Scheduling letter for cardioversion sent.  Scripts sent

## 2023-08-09 NOTE — TELEPHONE ENCOUNTER
Pt states he has been in afib past 2 days. States HR will drop to 40 and he feels ling he is going to pass out. If he walks it goes over 100. Tyrell chest pain and sob. States unable to send manual transmission due to machine not working  Please call to advise.

## 2023-08-09 NOTE — TELEPHONE ENCOUNTER
Spoke with Lisa Moctezuma . Arrive tomorrow at 6 for cardioversion at 15. NPO after midnight. Do not miss dose of AC. Take meds with sip of water in the am. Verbalized understanding.

## 2023-08-10 ENCOUNTER — HOSPITAL ENCOUNTER (OUTPATIENT)
Dept: CARDIAC CATH/INVASIVE PROCEDURES | Age: 78
Discharge: HOME | End: 2023-08-10
Attending: INTERNAL MEDICINE | Admitting: INTERNAL MEDICINE
Payer: MEDICARE

## 2023-08-10 VITALS
HEIGHT: 67 IN | WEIGHT: 218 LBS | HEART RATE: 133 BPM | SYSTOLIC BLOOD PRESSURE: 151 MMHG | DIASTOLIC BLOOD PRESSURE: 100 MMHG | RESPIRATION RATE: 18 BRPM | OXYGEN SATURATION: 99 % | TEMPERATURE: 97.9 F | BODY MASS INDEX: 34.21 KG/M2

## 2023-08-10 LAB
EKG ATRIAL RATE: 266 BPM
EKG ATRIAL RATE: 61 BPM
EKG DIAGNOSIS: NORMAL
EKG DIAGNOSIS: NORMAL
EKG P AXIS: 169 DEGREES
EKG P AXIS: 49 DEGREES
EKG P-R INTERVAL: 168 MS
EKG Q-T INTERVAL: 322 MS
EKG Q-T INTERVAL: 408 MS
EKG QRS DURATION: 106 MS
EKG QRS DURATION: 94 MS
EKG QTC CALCULATION (BAZETT): 410 MS
EKG QTC CALCULATION (BAZETT): 479 MS
EKG R AXIS: 29 DEGREES
EKG R AXIS: 33 DEGREES
EKG T AXIS: -57 DEGREES
EKG T AXIS: 31 DEGREES
EKG VENTRICULAR RATE: 133 BPM
EKG VENTRICULAR RATE: 61 BPM
HCT VFR BLD AUTO: 47.5 % (ref 40.5–52.5)
HGB BLD-MCNC: 15.4 G/DL (ref 13.5–17.5)

## 2023-08-10 PROCEDURE — 85014 HEMATOCRIT: CPT

## 2023-08-10 PROCEDURE — 85018 HEMOGLOBIN: CPT

## 2023-08-10 PROCEDURE — 92960 CARDIOVERSION ELECTRIC EXT: CPT

## 2023-08-10 PROCEDURE — 7100000010 HC PHASE II RECOVERY - FIRST 15 MIN

## 2023-08-10 PROCEDURE — 99152 MOD SED SAME PHYS/QHP 5/>YRS: CPT | Performed by: INTERNAL MEDICINE

## 2023-08-10 PROCEDURE — 93010 ELECTROCARDIOGRAM REPORT: CPT | Performed by: INTERNAL MEDICINE

## 2023-08-10 PROCEDURE — 2500000003 HC RX 250 WO HCPCS

## 2023-08-10 PROCEDURE — 36415 COLL VENOUS BLD VENIPUNCTURE: CPT

## 2023-08-10 PROCEDURE — 92960 CARDIOVERSION ELECTRIC EXT: CPT | Performed by: INTERNAL MEDICINE

## 2023-08-10 PROCEDURE — 93005 ELECTROCARDIOGRAM TRACING: CPT | Performed by: INTERNAL MEDICINE

## 2023-08-15 NOTE — PROCEDURES
[facility]     Electrophysiology Procedure Note       Date of Procedure: 8/15/2023  Patient's Name: Janene Barragan  YOB: 1945   Medical Record Number: [id number]  Procedure Performed by: Ye Bello MD    Procedures performed:  IV sedation. External Electrical cardioversion   Mallampati3  ASA 3    Indication of the procedure: Persistent   atrial flutter      Details of procedure: The patient was brought to the cath lab area in a fasting and non-sedated state. The risks, benefits and alternatives of the procedure were discussed with the patient. The patient opted to proceed with the procedure. Written informed consent was signed and placed in the chart. A timeout protocol was completed to identify the patient and the procedure being performed. I pushed 60 mg Brevital.  We monitored the patient's level of consciousness and vital signs/physiologic status throughout the procedure duration (see start and stop times below). Sedation:    Sedation start: 1100  Sedation stop: 1125     Patient is on chronic anticoagulation therapy. Then we used Brevital for sedation and electrical DC cardioversion was perfomred using 200J, synchronized shock. Patient was converted to sinus rhythm at 63 bpm. The patient tolerated the procedure well and there were no complications. Conclusion:   Successful external DC cardioversion of atrial  flutter . Plan:   The patient can be discharged if remains stable. Will continue with medical therapy.

## 2023-08-18 ENCOUNTER — TELEPHONE (OUTPATIENT)
Dept: CARDIOLOGY CLINIC | Age: 78
End: 2023-08-18

## 2023-08-18 NOTE — TELEPHONE ENCOUNTER
Patient is aware we don't have any dates until Nov/Dec.  I will be in touch as soon as something opens up. Procedure - ATRIAL FIBRILLATION ABLATION   Date:  Arrival time:   Procedure time:     Our  will be contacting you with a date and time for your procedure     The morning of your ablation you will need to check in at the registration desk in the main lobby. PRE-PROCEDURE INSTRUCTIONS -   -Do not eat or drink anything after midnight the night before your ablation.  -You will need to have a responsible adult to drive you home.  -Your nurse will provide you with discharge instructions.  -You will need to hold your Eliquis  for the night before and morning of the procedure.   -If you are taking a diuretic (water pill) please hold the morning of the procedure  -If you take long acting insulin, take 1/2 the dose the evening before or morning of depending on when you take your dosage.  -You may take all of your other medications with a sip of water. You will be scheduled for a 6-8 week follow up with cardiology. This will be done prior to your discharge instructions. If you have any questions regarding the procedure itself or medications, please call [phone] and ask to speak to an EP nurse.

## 2023-08-28 RX ORDER — APIXABAN 5 MG/1
TABLET, FILM COATED ORAL
Qty: 180 TABLET | Refills: 2 | Status: SHIPPED | OUTPATIENT
Start: 2023-08-28

## 2023-08-28 NOTE — TELEPHONE ENCOUNTER
Last OV : 7/5/23 mxa    Last Labs : 3/3/23 [facility]    Last Refill : 12/8/22 60w5     Next OV : 11/27/23 npsr

## 2023-09-08 PROCEDURE — G2066 INTER DEVC REMOTE 30D: HCPCS | Performed by: INTERNAL MEDICINE

## 2023-09-08 PROCEDURE — 93298 REM INTERROG DEV EVAL SCRMS: CPT | Performed by: INTERNAL MEDICINE

## 2023-09-21 NOTE — TELEPHONE ENCOUNTER
Spoke to the pt and he is aware I don't have any dates for this year currently for his procedure. I will be in touch as soon as something becomes available.

## 2023-10-13 PROCEDURE — G2066 INTER DEVC REMOTE 30D: HCPCS | Performed by: INTERNAL MEDICINE

## 2023-10-13 PROCEDURE — 93298 REM INTERROG DEV EVAL SCRMS: CPT | Performed by: INTERNAL MEDICINE

## 2023-10-29 NOTE — PROGRESS NOTES
[address] [facility]   Electrophysiology  DORCAS Cook  Attending EP: Dr. Coby Ley    Date: 11/27/2023  I had the privilege of visiting Lisbeth Chung in the office. Chief Complaint:   Chief Complaint   Patient presents with    Device Check     3mnth with appt    Follow-up     3mnth No Cardiac Symptoms with Device Check     History of Present Illness: History obtained from patient and medical record. Lisbeth Chung is 66 y.o. male with a past medical history of atrial fibrillation, HTN, HLD, DM, DANO, and obesity. Hx of multiple DCCV. S/p RFCA with PVI (10/5/20). S/p DCCV (8/10/23)    -Interval history: Today, Lisbeth Chung is being seen for routine follow up. He is doing well. He has had two episodes of atrial fibrillation/flutter since his cardioversion in August. He thinks he forgot to take his propafenone on those days. He was symptomatic with the episodes. He would like to hold off on the ablation for now. Denies having chest pain, palpitations, shortness of breath, orthopnea/PND, cough, or dizziness at the time of this visit. With regard to medication therapy the patient has been compliant with prescribed regimen. They have tolerated therapy to date. Allergies: Allergies   Allergen Reactions    Amoxicillin Rash     Home Medications:  Prior to Visit Medications    Medication Sig Taking?  Authorizing Provider   propafenone (RYTHMOL) 150 MG tablet TAKE 1 TABLET BY MOUTH IN THE MORNING AND AT BEDTIME Yes DANNIE Cook CNP   ELIQUIS 5 MG TABS tablet Take 1 tablet by mouth twice daily Yes DANNIE Cook CNP   amLODIPine (NORVASC) 10 MG tablet Take 1 tablet by mouth once daily Yes DANNIE Cook CNP   albuterol sulfate HFA (VENTOLIN HFA) 108 (90 Base) MCG/ACT inhaler Inhale 2 puffs into the lungs 4 times daily as needed for Wheezing Yes Jeri Olivera MD   losartan (COZAAR) 100 MG tablet Take 1 tablet by mouth daily Yes Cristofer Gonzalez MD   fluticasone (FLONASE) 50

## 2023-10-31 ENCOUNTER — TELEPHONE (OUTPATIENT)
Dept: CARDIOLOGY CLINIC | Age: 78
End: 2023-10-31

## 2023-10-31 NOTE — TELEPHONE ENCOUNTER
LVM for pt to return call I have a cancellation spot for procedure tomorrow trying to see if he would like to take it.

## 2023-11-09 ENCOUNTER — TELEPHONE (OUTPATIENT)
Dept: CARDIOLOGY | Age: 78
End: 2023-11-09

## 2023-11-17 PROCEDURE — G2066 INTER DEVC REMOTE 30D: HCPCS | Performed by: INTERNAL MEDICINE

## 2023-11-17 PROCEDURE — 93298 REM INTERROG DEV EVAL SCRMS: CPT | Performed by: INTERNAL MEDICINE

## 2023-11-27 ENCOUNTER — NURSE ONLY (OUTPATIENT)
Dept: CARDIOLOGY CLINIC | Age: 78
End: 2023-11-27

## 2023-11-27 ENCOUNTER — OFFICE VISIT (OUTPATIENT)
Dept: CARDIOLOGY CLINIC | Age: 78
End: 2023-11-27
Payer: MEDICARE

## 2023-11-27 VITALS
OXYGEN SATURATION: 96 % | DIASTOLIC BLOOD PRESSURE: 72 MMHG | BODY MASS INDEX: 35.84 KG/M2 | WEIGHT: 223 LBS | HEIGHT: 66 IN | HEART RATE: 70 BPM | SYSTOLIC BLOOD PRESSURE: 138 MMHG

## 2023-11-27 DIAGNOSIS — R00.1 BRADYCARDIA: Primary | ICD-10-CM

## 2023-11-27 PROCEDURE — 1036F TOBACCO NON-USER: CPT | Performed by: NURSE PRACTITIONER

## 2023-11-27 PROCEDURE — G8427 DOCREV CUR MEDS BY ELIG CLIN: HCPCS | Performed by: NURSE PRACTITIONER

## 2023-11-27 PROCEDURE — 99214 OFFICE O/P EST MOD 30 MIN: CPT | Performed by: NURSE PRACTITIONER

## 2023-11-27 PROCEDURE — 3078F DIAST BP <80 MM HG: CPT | Performed by: NURSE PRACTITIONER

## 2023-11-27 PROCEDURE — 1123F ACP DISCUSS/DSCN MKR DOCD: CPT | Performed by: NURSE PRACTITIONER

## 2023-11-27 PROCEDURE — G8417 CALC BMI ABV UP PARAM F/U: HCPCS | Performed by: NURSE PRACTITIONER

## 2023-11-27 PROCEDURE — G8484 FLU IMMUNIZE NO ADMIN: HCPCS | Performed by: NURSE PRACTITIONER

## 2023-11-27 PROCEDURE — 3075F SYST BP GE 130 - 139MM HG: CPT | Performed by: NURSE PRACTITIONER

## 2023-11-27 RX ORDER — PROPAFENONE HYDROCHLORIDE 150 MG/1
150 TABLET, COATED ORAL 2 TIMES DAILY
Qty: 180 TABLET | Refills: 1 | Status: SHIPPED | OUTPATIENT
Start: 2023-11-27

## 2023-11-27 NOTE — TELEPHONE ENCOUNTER
Received refill request for rythmol from [address] [place] [address].     Last ov: 07/05/2023 M[facility]    Last Refill: 08/09/2023 #60 w/ 3 refills    Next appointment: 11/27/2023 NPSR

## 2023-11-27 NOTE — PROGRESS NOTES
Patient comes in for their OV with NPSR. S/p Medtronic [id number] LINQ II Implant on 10/12/2022. Carelink Interrogation shows a Battery Status GOOD. Since 110/2/2022 AF with a 2.1% [place]. 0.2% PVC burden. Implanted for AF management. Patient remains Eliquis and metoprolol. Please see interrogation under Media tab for more detail. We will continue to follow the Patient remotely.

## 2023-11-29 NOTE — TELEPHONE ENCOUNTER
Spoke with the pt and he saw shine yesterday and they are going to wait another 6 months to see how he is doing. No procedure needed at this time.

## 2023-12-22 PROCEDURE — 93298 REM INTERROG DEV EVAL SCRMS: CPT | Performed by: INTERNAL MEDICINE

## 2023-12-22 PROCEDURE — G2066 INTER DEVC REMOTE 30D: HCPCS | Performed by: INTERNAL MEDICINE

## 2024-01-26 PROCEDURE — 93298 REM INTERROG DEV EVAL SCRMS: CPT | Performed by: INTERNAL MEDICINE

## 2024-01-29 ENCOUNTER — HOSPITAL ENCOUNTER (OUTPATIENT)
Age: 79
Discharge: HOME | End: 2024-01-29
Payer: MEDICARE

## 2024-01-29 ENCOUNTER — OFFICE VISIT (OUTPATIENT)
Dept: PULMONOLOGY | Age: 79
End: 2024-01-29
Payer: MEDICARE

## 2024-01-29 ENCOUNTER — HOSPITAL ENCOUNTER (OUTPATIENT)
Dept: GENERAL RADIOLOGY | Age: 79
Discharge: HOME | End: 2024-01-29
Payer: MEDICARE

## 2024-01-29 VITALS
DIASTOLIC BLOOD PRESSURE: 60 MMHG | SYSTOLIC BLOOD PRESSURE: 132 MMHG | HEART RATE: 69 BPM | WEIGHT: 228 LBS | BODY MASS INDEX: 36.64 KG/M2 | OXYGEN SATURATION: 93 % | HEIGHT: 66 IN

## 2024-01-29 DIAGNOSIS — R05.1 ACUTE COUGH: Primary | ICD-10-CM

## 2024-01-29 DIAGNOSIS — R05.1 ACUTE COUGH: ICD-10-CM

## 2024-01-29 PROCEDURE — 1123F ACP DISCUSS/DSCN MKR DOCD: CPT | Performed by: INTERNAL MEDICINE

## 2024-01-29 PROCEDURE — 3078F DIAST BP <80 MM HG: CPT | Performed by: INTERNAL MEDICINE

## 2024-01-29 PROCEDURE — G8427 DOCREV CUR MEDS BY ELIG CLIN: HCPCS | Performed by: INTERNAL MEDICINE

## 2024-01-29 PROCEDURE — 71046 X-RAY EXAM CHEST 2 VIEWS: CPT

## 2024-01-29 PROCEDURE — G8484 FLU IMMUNIZE NO ADMIN: HCPCS | Performed by: INTERNAL MEDICINE

## 2024-01-29 PROCEDURE — 1036F TOBACCO NON-USER: CPT | Performed by: INTERNAL MEDICINE

## 2024-01-29 PROCEDURE — G8417 CALC BMI ABV UP PARAM F/U: HCPCS | Performed by: INTERNAL MEDICINE

## 2024-01-29 PROCEDURE — 3075F SYST BP GE 130 - 139MM HG: CPT | Performed by: INTERNAL MEDICINE

## 2024-01-29 PROCEDURE — 99214 OFFICE O/P EST MOD 30 MIN: CPT | Performed by: INTERNAL MEDICINE

## 2024-01-29 RX ORDER — TRAZODONE HYDROCHLORIDE 50 MG/1
50 TABLET ORAL NIGHTLY
Qty: 30 TABLET | Refills: 0 | Status: SHIPPED | OUTPATIENT
Start: 2024-01-29 | End: 2024-02-28

## 2024-01-29 RX ORDER — GUAIFENESIN/DEXTROMETHORPHAN 100-10MG/5
5 SYRUP ORAL 3 TIMES DAILY PRN
Qty: 150 ML | Refills: 0 | Status: SHIPPED | OUTPATIENT
Start: 2024-01-29 | End: 2024-02-08

## 2024-01-29 RX ORDER — PREDNISONE 20 MG/1
TABLET ORAL
Qty: 20 TABLET | Refills: 0 | Status: SHIPPED | OUTPATIENT
Start: 2024-01-29 | End: 2024-02-06

## 2024-01-29 ASSESSMENT — SLEEP AND FATIGUE QUESTIONNAIRES
HOW LIKELY ARE YOU TO NOD OFF OR FALL ASLEEP WHILE SITTING QUIETLY AFTER LUNCH WITHOUT ALCOHOL: 1
HOW LIKELY ARE YOU TO NOD OFF OR FALL ASLEEP WHILE SITTING AND TALKING TO SOMEONE: 0
HOW LIKELY ARE YOU TO NOD OFF OR FALL ASLEEP WHILE LYING DOWN TO REST IN THE AFTERNOON WHEN CIRCUMSTANCES PERMIT: 1
HOW LIKELY ARE YOU TO NOD OFF OR FALL ASLEEP IN A CAR, WHILE STOPPED FOR A FEW MINUTES IN TRAFFIC: 0
HOW LIKELY ARE YOU TO NOD OFF OR FALL ASLEEP WHILE SITTING INACTIVE IN A PUBLIC PLACE: 1
HOW LIKELY ARE YOU TO NOD OFF OR FALL ASLEEP WHILE WATCHING TV: 1
HOW LIKELY ARE YOU TO NOD OFF OR FALL ASLEEP WHILE SITTING AND READING: 1
ESS TOTAL SCORE: 5
HOW LIKELY ARE YOU TO NOD OFF OR FALL ASLEEP WHEN YOU ARE A PASSENGER IN A CAR FOR AN HOUR WITHOUT A BREAK: 0

## 2024-01-29 NOTE — PROGRESS NOTES
in a non-supine position.   -Maintain good sleep hygiene, with consistent bedtime everyday.   -Avoid caffeinated or alcoholic beverages after 5 PM.   -Do not drive when feeling sleepy.     3. Paroxysmal atrial fibrillation (HCC)  -As per cardiology services.  -He will benefit with continued diuretic usage.  Currently on hydrochlorothiazide.  Intermittently taking furosemide.  Advised him to take Lasix daily for next 5 to 7 days.  -Has been started on Eliquis.    4. Class 2 severe obesity due to excess calories with serious comorbidity and body mass index (BMI) of 37.0 to 37.9 in adult (HCC)  -I discussed weight loss as a treatment strategy in achieving the healthcare goals.    If patient loses even 10 to 15% of current body weight, it will be beneficial in improving the overall health.     Return in about 3 months (around 4/29/2024).       Flo Lane MD  Pulmonary Critical Care and Sleep Medicine  1/29/2024, 9:38 AM    This note was completed using dragon medical speech recognition software. Grammatical errors, random word insertions, pronoun errors and incomplete sentences are occasional consequences of this technology due to software limitations. If there are questions or concerns about the content of this note of information contained within the body of this dictation they should be addressed with the provider for clarification.

## 2024-02-06 ENCOUNTER — TELEPHONE (OUTPATIENT)
Dept: CARDIOLOGY CLINIC | Age: 79
End: 2024-02-06

## 2024-02-06 NOTE — TELEPHONE ENCOUNTER
Pt states he had covid 3 weeks ago and has lingering lung and sinus infection that was treated by pulmonologist. States he is having irregular HR and swelling. States he has loop recorder, but not able to send manual transmission. Please call pt to advise.

## 2024-02-06 NOTE — TELEPHONE ENCOUNTER
No recent AT/AF. His last episodes were in December. No changes are needed at this time from EP perspective. His swelling is likely due to his treatment (steroids).

## 2024-04-15 RX ORDER — ALBUTEROL SULFATE 90 UG/1
AEROSOL, METERED RESPIRATORY (INHALATION)
Qty: 18 G | Refills: 0 | Status: SHIPPED | OUTPATIENT
Start: 2024-04-15

## 2024-04-16 ASSESSMENT — SLEEP AND FATIGUE QUESTIONNAIRES
HOW LIKELY ARE YOU TO NOD OFF OR FALL ASLEEP WHILE SITTING AND TALKING TO SOMEONE: WOULD NEVER DOZE
HOW LIKELY ARE YOU TO NOD OFF OR FALL ASLEEP WHILE LYING DOWN TO REST IN THE AFTERNOON WHEN CIRCUMSTANCES PERMIT: MODERATE CHANCE OF DOZING
HOW LIKELY ARE YOU TO NOD OFF OR FALL ASLEEP WHEN YOU ARE A PASSENGER IN A CAR FOR AN HOUR WITHOUT A BREAK: WOULD NEVER DOZE
HOW LIKELY ARE YOU TO NOD OFF OR FALL ASLEEP WHILE SITTING INACTIVE IN A PUBLIC PLACE: WOULD NEVER DOZE
HOW LIKELY ARE YOU TO NOD OFF OR FALL ASLEEP WHILE SITTING AND READING: SLIGHT CHANCE OF DOZING
ESS TOTAL SCORE: 5
HOW LIKELY ARE YOU TO NOD OFF OR FALL ASLEEP WHILE SITTING QUIETLY AFTER LUNCH WITHOUT ALCOHOL: SLIGHT CHANCE OF DOZING
HOW LIKELY ARE YOU TO NOD OFF OR FALL ASLEEP WHILE LYING DOWN TO REST IN THE AFTERNOON WHEN CIRCUMSTANCES PERMIT: MODERATE CHANCE OF DOZING
HOW LIKELY ARE YOU TO NOD OFF OR FALL ASLEEP WHILE SITTING QUIETLY AFTER LUNCH WITHOUT ALCOHOL: SLIGHT CHANCE OF DOZING
HOW LIKELY ARE YOU TO NOD OFF OR FALL ASLEEP WHILE SITTING AND TALKING TO SOMEONE: WOULD NEVER DOZE
HOW LIKELY ARE YOU TO NOD OFF OR FALL ASLEEP IN A CAR, WHILE STOPPED FOR A FEW MINUTES IN TRAFFIC: WOULD NEVER DOZE
HOW LIKELY ARE YOU TO NOD OFF OR FALL ASLEEP WHILE SITTING AND READING: SLIGHT CHANCE OF DOZING
HOW LIKELY ARE YOU TO NOD OFF OR FALL ASLEEP WHILE WATCHING TV: SLIGHT CHANCE OF DOZING
HOW LIKELY ARE YOU TO NOD OFF OR FALL ASLEEP WHEN YOU ARE A PASSENGER IN A CAR FOR AN HOUR WITHOUT A BREAK: WOULD NEVER DOZE
HOW LIKELY ARE YOU TO NOD OFF OR FALL ASLEEP WHILE SITTING INACTIVE IN A PUBLIC PLACE: WOULD NEVER DOZE
HOW LIKELY ARE YOU TO NOD OFF OR FALL ASLEEP IN A CAR, WHILE STOPPED FOR A FEW MINUTES IN TRAFFIC: WOULD NEVER DOZE
HOW LIKELY ARE YOU TO NOD OFF OR FALL ASLEEP WHILE WATCHING TV: SLIGHT CHANCE OF DOZING

## 2024-04-17 ENCOUNTER — OFFICE VISIT (OUTPATIENT)
Dept: PULMONOLOGY | Age: 79
End: 2024-04-17
Payer: MEDICARE

## 2024-04-17 VITALS
HEIGHT: 66 IN | OXYGEN SATURATION: 94 % | DIASTOLIC BLOOD PRESSURE: 60 MMHG | HEART RATE: 67 BPM | SYSTOLIC BLOOD PRESSURE: 132 MMHG | BODY MASS INDEX: 35.2 KG/M2 | WEIGHT: 219 LBS

## 2024-04-17 DIAGNOSIS — E66.01 CLASS 2 SEVERE OBESITY DUE TO EXCESS CALORIES WITH SERIOUS COMORBIDITY AND BODY MASS INDEX (BMI) OF 35.0 TO 35.9 IN ADULT (HCC): ICD-10-CM

## 2024-04-17 DIAGNOSIS — G47.33 OSA (OBSTRUCTIVE SLEEP APNEA): ICD-10-CM

## 2024-04-17 DIAGNOSIS — G47.33 OSA ON CPAP: ICD-10-CM

## 2024-04-17 DIAGNOSIS — I48.0 PAF (PAROXYSMAL ATRIAL FIBRILLATION) (HCC): ICD-10-CM

## 2024-04-17 DIAGNOSIS — R06.02 SHORTNESS OF BREATH: Primary | ICD-10-CM

## 2024-04-17 PROCEDURE — 3078F DIAST BP <80 MM HG: CPT | Performed by: INTERNAL MEDICINE

## 2024-04-17 PROCEDURE — 3075F SYST BP GE 130 - 139MM HG: CPT | Performed by: INTERNAL MEDICINE

## 2024-04-17 PROCEDURE — 99214 OFFICE O/P EST MOD 30 MIN: CPT | Performed by: INTERNAL MEDICINE

## 2024-04-17 PROCEDURE — 1036F TOBACCO NON-USER: CPT | Performed by: INTERNAL MEDICINE

## 2024-04-17 PROCEDURE — G8427 DOCREV CUR MEDS BY ELIG CLIN: HCPCS | Performed by: INTERNAL MEDICINE

## 2024-04-17 PROCEDURE — 1123F ACP DISCUSS/DSCN MKR DOCD: CPT | Performed by: INTERNAL MEDICINE

## 2024-04-17 PROCEDURE — G8417 CALC BMI ABV UP PARAM F/U: HCPCS | Performed by: INTERNAL MEDICINE

## 2024-04-17 RX ORDER — AZELASTINE HYDROCHLORIDE, FLUTICASONE PROPIONATE 137; 50 UG/1; UG/1
1 SPRAY, METERED NASAL 2 TIMES DAILY
Qty: 23 G | Refills: 1 | Status: SHIPPED | OUTPATIENT
Start: 2024-04-17

## 2024-04-17 RX ORDER — LORATADINE 10 MG/1
10 TABLET ORAL DAILY
COMMUNITY

## 2024-04-17 RX ORDER — FEXOFENADINE HCL 180 MG/1
180 TABLET ORAL DAILY
Qty: 5 TABLET | Refills: 0 | Status: SHIPPED | OUTPATIENT
Start: 2024-04-17 | End: 2024-04-22

## 2024-04-17 NOTE — PROGRESS NOTES
2.46/25  FVC: 3.09/86  FEV1/FVC: 79  Total lung capacity: 111%  Residual volume: 115%  Diffusion capacity: 149%  Airway resistance: 124%    This complete PFT was personally reviewed by me and my interpretation is: Spirometry values are within normal limits.  Normal total lung capacity and residual volume with increased diffusion capacity.  Airway resistance is increased.  Impression: Possibly very mild obstructive airway disease.    CPAP compliance summary     1/29/2024 -4/3/2024 7/31/2023 -1/22/2024 1/16/2023 -7/23/2023 7/16/2022 -1/11/2023 12/5/2021-6/2/2022 8/31/2021-11/28/2021 3/11/2021-6/8/2021   Days with device usage 66/66 days 169/176 days 178/189 days 178/180 days  180/180 days  90/90 days  88/90 days   Average Usage 6 hours 38 minutes 7 hours 12 minutes 7 hours 53 minutes 7 hours 17 minutes  7 hours 25 minutes  7 hours 26 minutes  7 hours 24 minutes   Days with device usage >4hrs 94% 94% 93% 96%  98%  100%  98%   Large Leak per night 5.7 L/min 7.2 L/min 6.2 L/min 11.9 L/min  9.7 L/min  10.9 L/min  12.1 L/min   AHI 4.3 1.8 0.8 1.2  1.4  1.5  1.6   CPAP settings 7 cm H2O 7 cm H2O 7 cm H2O 7 cm H2O  7 cmH2O  7 cmH2O  7 cmH2O   90th percentile pressure - - - - - - -   Mask Fullface mask Fullface mask Fullface mask Fullface mask  fullface mask  full facemask  fullface mask     DME: Medical service company      IMPRESSION AND RECOMMENDATIONS:     1. Shortness of breath  -In the past I have felt that his shortness of breath may have been a mix of various factors contributing including  paroxysmal atrial fibrillation, morbid obesity and sleep apnea.    -Cannot completely rule out an obstructive airway disease.  Patient has been a non-smoker and no occupational exposure to dust, smoke or fumes.  -Complete PFT does not show any obstructive airway disease.  No indications for long-acting beta-2 agonist or long-acting muscarinic agents.    -Currently having upper airway cough due to postnasal drip.  -I will start him

## 2024-04-22 ENCOUNTER — TELEPHONE (OUTPATIENT)
Dept: CARDIOLOGY CLINIC | Age: 79
End: 2024-04-22

## 2024-04-22 NOTE — TELEPHONE ENCOUNTER
He appears to be in atrial fib/flutter persistently. Please have him resume his metoprolol at 12.5 mg BID to see if he converts in next 48 hours. If he doesn't, then we can cardiovert him.    Jamie Merida, DANNIE-CNP

## 2024-04-22 NOTE — TELEPHONE ENCOUNTER
EP Triage Questionnaire  Does the patient have a device?   [x]Y  [] N, If yes ask them to please send an interrogation.  Loop Recorder     What is their HR and BP?   67- 132/65  Have they had a recent procedure?   No   What symptoms are they having?   Just the heart Flutter   How long have they had symptoms?    Since 4/17  Have they taken or missed medications recently and if so which ones?   Pt has taken off of their medications.  Ask the pt to send transmission they stated office was able to get the information, pt is wanting to know if they need a CV?

## 2024-04-22 NOTE — TELEPHONE ENCOUNTER
Call placed to Alvin regarding his transmission. Discussed restarting metoprolol  12.5 mg BID, script sent to his pharmacy. If he does not convert in 48 hours he will let us know

## 2024-05-07 ENCOUNTER — TELEPHONE (OUTPATIENT)
Dept: CARDIOLOGY CLINIC | Age: 79
End: 2024-05-07

## 2024-05-07 NOTE — PROGRESS NOTES
10/08/2020    HGB 15.4 08/10/2023    HCT 47.5 08/10/2023    MCV 84.0 10/08/2020     10/08/2020     BMP:   Lab Results   Component Value Date    CREATININE 1.2 10/08/2020    BUN 28 (H) 10/08/2020     10/08/2020    K 4.3 10/08/2020    CL 99 10/08/2020    CO2 26 10/08/2020     CrCl cannot be calculated (Patient's most recent lab result is older than the maximum 180 days allowed.).     Thyroid:   Lab Results   Component Value Date    TSH 1.87 10/05/2020     Lipid Panel:   Lab Results   Component Value Date/Time    CHOL 156 10/05/2020 11:09 AM    HDL 54 10/05/2020 11:09 AM    TRIG 139 10/05/2020 11:09 AM     LFTs:  Lab Results   Component Value Date    ALT 55 (H) 10/05/2020    AST 34 10/05/2020    ALKPHOS 70 10/05/2020    BILITOT 0.6 10/05/2020     Coags:   Lab Results   Component Value Date    PROTIME 22.3 (H) 10/09/2020    INR 1.91 (H) 10/09/2020       EC2024  - Atrial flutter. Rate 87, , QTc 408    Echo:    Normal left ventricle size, wall thickness and systolic function with an estimated ejection fraction of 60%. No regional wall motion abnormalities are seen. E/e\"= 14. Diastolic filling parameters suggests normal diastolic function. Mild mitral regurgitation. The left atrium is mildly dilated. Mild to moderate tricuspid regurgitation. RVSP 49mmHg. PHTN.   Trivial pulmonic regurgitation.   IVC size is normal (<2.1cm) and collapses > 50% with respiration consistent with normal RA pressure (3mmHg).    GXT:    Normal LV size with hyperdynamic systolic function.    Left ventricular ejection fraction of 73 %.    There is normal isotope uptake at stress and rest.    There is no evidence of myocardial ischemia or scar.     Assessment:    1. Paroxysmal Atrial Fibrillation/Flutter   - S/p RFCA with PVI (10/5/20, Dr. Flores). S/p DCCV (8/10/23)   - Currently in atrial flutter. Symptomatic   - Continue propafenone 150 mg BID, metoprolol 12.5 mg BID    ~ . Monitor for side effects     -

## 2024-05-07 NOTE — TELEPHONE ENCOUNTER
Spoke with patient,  he complains of heart flutters and being in afib on his watch over 2 weeks now.  The Metoprolol didn't help with converting him back into a regular rhythm.  Denies any CP, SOB but he does have fatigue.     BP  122/77  HR  102      Unable to send a transmission from his loop recorder.

## 2024-05-07 NOTE — TELEPHONE ENCOUNTER
Difficult to discern on loop. Please see if he can come in to see me tomorrow or Thursday at 930 or 315.

## 2024-05-07 NOTE — TELEPHONE ENCOUNTER
EP Triage Questionnaire  Does the patient have a device? [x]Y  [] N, If yes ask them to please send an interrogation. Loop Recorder  What is their HR and BP?  100    Have they had a recent procedure? No    What symptoms are they having? Heart Fluttering     How long have they had symptoms? Couple Weeks  4/17    Have they taken or missed medications recently and if so which ones?  Pt is taking Metoprolol 12.5 BID and has not convert, pt feel like they may need a CV    Ask the pt to send transmission they stated office was able to get the information, pt is wanting to know if they need a CV?

## 2024-05-07 NOTE — TELEPHONE ENCOUNTER
Can we pull a report from his loop to just make sure he is in atrial fib/flutter? If so, we can arrange cardioversion

## 2024-05-08 ENCOUNTER — OFFICE VISIT (OUTPATIENT)
Dept: CARDIOLOGY CLINIC | Age: 79
End: 2024-05-08
Payer: MEDICARE

## 2024-05-08 ENCOUNTER — NURSE ONLY (OUTPATIENT)
Dept: CARDIOLOGY CLINIC | Age: 79
End: 2024-05-08

## 2024-05-08 VITALS
HEART RATE: 90 BPM | DIASTOLIC BLOOD PRESSURE: 74 MMHG | HEIGHT: 66 IN | BODY MASS INDEX: 43.1 KG/M2 | WEIGHT: 268.2 LBS | SYSTOLIC BLOOD PRESSURE: 134 MMHG | OXYGEN SATURATION: 97 %

## 2024-05-08 DIAGNOSIS — I48.4 ATYPICAL ATRIAL FLUTTER (HCC): ICD-10-CM

## 2024-05-08 DIAGNOSIS — Z45.09 ENCOUNTER FOR ELECTRONIC ANALYSIS OF REVEAL EVENT RECORDER: ICD-10-CM

## 2024-05-08 DIAGNOSIS — E78.49 OTHER HYPERLIPIDEMIA: ICD-10-CM

## 2024-05-08 DIAGNOSIS — I48.0 PAF (PAROXYSMAL ATRIAL FIBRILLATION) (HCC): Primary | ICD-10-CM

## 2024-05-08 DIAGNOSIS — E66.01 CLASS 2 SEVERE OBESITY DUE TO EXCESS CALORIES WITH SERIOUS COMORBIDITY AND BODY MASS INDEX (BMI) OF 35.0 TO 35.9 IN ADULT (HCC): ICD-10-CM

## 2024-05-08 DIAGNOSIS — I10 ESSENTIAL HYPERTENSION: ICD-10-CM

## 2024-05-08 DIAGNOSIS — Z95.818 STATUS POST PLACEMENT OF IMPLANTABLE LOOP RECORDER: ICD-10-CM

## 2024-05-08 PROCEDURE — 3075F SYST BP GE 130 - 139MM HG: CPT | Performed by: NURSE PRACTITIONER

## 2024-05-08 PROCEDURE — G8427 DOCREV CUR MEDS BY ELIG CLIN: HCPCS | Performed by: NURSE PRACTITIONER

## 2024-05-08 PROCEDURE — 99215 OFFICE O/P EST HI 40 MIN: CPT | Performed by: NURSE PRACTITIONER

## 2024-05-08 PROCEDURE — 3078F DIAST BP <80 MM HG: CPT | Performed by: NURSE PRACTITIONER

## 2024-05-08 PROCEDURE — 93000 ELECTROCARDIOGRAM COMPLETE: CPT | Performed by: NURSE PRACTITIONER

## 2024-05-08 PROCEDURE — 1036F TOBACCO NON-USER: CPT | Performed by: NURSE PRACTITIONER

## 2024-05-08 PROCEDURE — G8417 CALC BMI ABV UP PARAM F/U: HCPCS | Performed by: NURSE PRACTITIONER

## 2024-05-08 PROCEDURE — 1123F ACP DISCUSS/DSCN MKR DOCD: CPT | Performed by: NURSE PRACTITIONER

## 2024-05-08 NOTE — PATIENT INSTRUCTIONS
You are scheduled for a  cardioversion.    Our  will call you to discuss a date for your procedure.    The day of your procedure you will need to check in at the Registration Desk in the Main Lobby.    PRE-PROCEDURE INSTRUCTIONS   Do not eat or drink anything after midnight the night before your procedure.   Take all your medications with a sip of water in the morning.   Do not take metoprolol or propafenone   Please have a responsible adult to drive you home after your procedure.    If you have any questions regarding your procedure itself or your medications, please call [phone] and ask to speak to an EP nurse.

## 2024-05-08 NOTE — PROGRESS NOTES
Patient comes in for their OV with NPSR.  Patient has Medtronic [id number] LINQ II Implant on 10/12/2022 with Dr. Flores.     Carelink Interrogation shows a Battery Status GOOD. Since 5/7/2024 AF with a 2.9% [place]. Its possible device is undersensing some AF. V rates have been elevated since ~ 4/15/2024 on cardiac compass. Changed AF detection to most sensitive today. 0.2% PVC burden. Implanted for AF management. Patient remains Eliquis and metoprolol. Please see interrogation under Media tab for more detail. We will continue to follow the Patient remotely.

## 2024-05-10 ENCOUNTER — TELEPHONE (OUTPATIENT)
Dept: CARDIOLOGY CLINIC | Age: 79
End: 2024-05-10

## 2024-05-10 NOTE — TELEPHONE ENCOUNTER
Spoke with the pt and got him schedule procedure. We went over instructions below and he verbalized understanding.     Procedure - CV   Date: 5/15/24  Arrival time: 10:00 am  Procedure time: 11:00 am      Alyce updated / added in epic / emailed cath lab

## 2024-05-15 ENCOUNTER — HOSPITAL ENCOUNTER (OUTPATIENT)
Age: 79
Discharge: HOME | End: 2024-05-17
Attending: INTERNAL MEDICINE
Payer: MEDICARE

## 2024-05-15 VITALS
SYSTOLIC BLOOD PRESSURE: 132 MMHG | TEMPERATURE: 98 F | DIASTOLIC BLOOD PRESSURE: 73 MMHG | BODY MASS INDEX: 44.65 KG/M2 | HEART RATE: 65 BPM | OXYGEN SATURATION: 97 % | WEIGHT: 268 LBS | HEIGHT: 65 IN | RESPIRATION RATE: 18 BRPM

## 2024-05-15 DIAGNOSIS — I48.4 ATYPICAL ATRIAL FLUTTER (HCC): ICD-10-CM

## 2024-05-15 LAB
ECHO BSA: 2.36 M2
EKG ATRIAL RATE: 113 BPM
EKG ATRIAL RATE: 66 BPM
EKG DIAGNOSIS: NORMAL
EKG DIAGNOSIS: NORMAL
EKG P AXIS: 89 DEGREES
EKG P-R INTERVAL: 192 MS
EKG Q-T INTERVAL: 328 MS
EKG Q-T INTERVAL: 412 MS
EKG QRS DURATION: 104 MS
EKG QRS DURATION: 108 MS
EKG QTC CALCULATION (BAZETT): 431 MS
EKG QTC CALCULATION (BAZETT): 446 MS
EKG R AXIS: 28 DEGREES
EKG R AXIS: 38 DEGREES
EKG T AXIS: 41 DEGREES
EKG T AXIS: 8 DEGREES
EKG VENTRICULAR RATE: 111 BPM
EKG VENTRICULAR RATE: 66 BPM

## 2024-05-15 PROCEDURE — 92960 CARDIOVERSION ELECTRIC EXT: CPT | Performed by: INTERNAL MEDICINE

## 2024-05-15 PROCEDURE — 99152 MOD SED SAME PHYS/QHP 5/>YRS: CPT | Performed by: INTERNAL MEDICINE

## 2024-05-15 PROCEDURE — 2500000003 HC RX 250 WO HCPCS

## 2024-05-15 PROCEDURE — 7100000010 HC PHASE II RECOVERY - FIRST 15 MIN: Performed by: INTERNAL MEDICINE

## 2024-05-15 PROCEDURE — 7100000011 HC PHASE II RECOVERY - ADDTL 15 MIN: Performed by: INTERNAL MEDICINE

## 2024-05-15 PROCEDURE — 93005 ELECTROCARDIOGRAM TRACING: CPT | Performed by: INTERNAL MEDICINE

## 2024-05-15 PROCEDURE — 2500000003 HC RX 250 WO HCPCS: Performed by: INTERNAL MEDICINE

## 2024-05-15 PROCEDURE — 92960 CARDIOVERSION ELECTRIC EXT: CPT

## 2024-05-15 RX ORDER — SODIUM CHLORIDE 9 MG/ML
INJECTION, SOLUTION INTRAVENOUS CONTINUOUS PRN
Status: COMPLETED | OUTPATIENT
Start: 2024-05-15 | End: 2024-05-15

## 2024-05-15 RX ORDER — SODIUM CHLORIDE 0.9 % (FLUSH) 0.9 %
5-40 SYRINGE (ML) INJECTION PRN
Status: DISCONTINUED | OUTPATIENT
Start: 2024-05-15 | End: 2024-05-18 | Stop reason: HOSPADM

## 2024-05-15 RX ADMIN — METHOHEXITAL SODIUM 50 MG: 500 INJECTION, POWDER, LYOPHILIZED, FOR SOLUTION INTRAMUSCULAR; INTRAVENOUS; RECTAL at 10:51

## 2024-05-15 RX ADMIN — SODIUM CHLORIDE 50 ML/HR: 9 INJECTION, SOLUTION INTRAVENOUS at 10:46

## 2024-05-15 NOTE — PROCEDURES
PROCEDURE NOTE  Date: 5/15/2024   Name: Alvin Marie  YOB: 1945    Procedures  [facility]     Electrophysiology Procedure Note       Date of Procedure: 5/15/2024  Patient's Name: Alvin Marie  YOB: 1945   Medical Record Number: [id number]  Procedure Performed by: Gabriel Flores MD    Procedures performed:  IV sedation.     External Electrical cardioversion   Mallampati3  ASA 3    Indication of the procedure: atrial flutter      Details of procedure:   The patient was brought to the cath lab area in a fasting and non-sedated state. The risks, benefits and alternatives of the procedure were discussed with the patient. The patient opted to proceed with the procedure. Written informed consent was signed and placed in the chart.  A timeout protocol was completed to identify the patient and the procedure being performed.        I pushed 50 mg Brevital.   We monitored the patient's level of consciousness and vital signs/physiologic status throughout the procedure duration (see start and stop times below).  Sedation:     Sedation start: 1045  Sedation stop: 11    Patient is on chronic anticoagulation therapy.   Then we used Brevital for sedation and electrical DC cardioversion was perfomred using 200J, synchronized shock. Patient was converted to sinus rhythm at 72 bpm. The patient tolerated the procedure well and there were no complications.     Conclusion:   Successful external DC cardioversion of atrial  flutter .     Plan:   The patient can be discharged if remains stable. Will continue with medical therapy.     Ablation if recurrent

## 2024-05-15 NOTE — PROGRESS NOTES
11:05- Patient up to chair, drinking orange juice. Patient A&Ox4. VSS. Patient in normal sinus rhythm. Discharge instructions reviewed in length with patient who verbalizes understanding.     11:15- Peripheral IV removed- tolerated well. Patient to be discharged to home, brother-in-law providing transportation. Patient to front lobby via wheelchair.

## 2024-05-15 NOTE — H&P
[facility]   Electrophysiology    Chief Complaint:   Chief Complaint   Patient presents with    Atrial Fibrillation     On  and off resting Hr mid to high 80s    Fatigue     History of Present Illness: History obtained from patient and medical record.    Alvin Marie is 78 y.o. male with a past medical history of atrial fibrillation, HTN, HLD, DM, DANO, and obesity. Hx of multiple DCCV. S/p RFCA with PVI (10/5/20). S/p DCCV (8/10/23)    -Interval history: Today, Alvin Marie is being seen for urgent follow up. His sister is present for the visit. He is in atrial flutter. He states he had missed his morning doses of his medications and they his heart went out of rhythm. He states the metoprolol helped, but he still felt fatigue/SOB. We discussed his treatment options.     Denies having chest pain, palpitations, shortness of breath, orthopnea/PND, cough, or dizziness at the time of this visit.    With regard to medication therapy the patient has been compliant with prescribed regimen. They have tolerated therapy to date.     Allergies:  Allergies   Allergen Reactions    Amoxicillin Rash     Home Medications:  Prior to Visit Medications    Medication Sig Taking? Authorizing Provider   metoprolol tartrate (LOPRESSOR) 25 MG tablet Take 0.5 tablets by mouth 2 times daily Yes Jamie Merida APRN - CNP   Azelastine-Fluticasone 137-50 MCG/ACT SUSP 1 Squirt by Nasal route in the morning and at bedtime Yes Flo Lane MD   albuterol sulfate HFA (PROVENTIL;VENTOLIN;PROAIR) 108 (90 Base) MCG/ACT inhaler INHALE 2 PUFFS BY MOUTH 4 TIMES DAILY AS NEEDED FOR WHEEZING Yes Flo Lane MD   propafenone (RYTHMOL) 150 MG tablet TAKE 1 TABLET BY MOUTH IN THE MORNING AND AT BEDTIME Yes Jamie Merida APRN - CNP   ELIQUIS 5 MG TABS tablet Take 1 tablet by mouth twice daily Yes Jamie Merida APRN - CNP   amLODIPine (NORVASC) 10 MG tablet Take 1 tablet by mouth once daily Yes Jamie Merida APRN - CNP   losartan

## 2024-05-21 RX ORDER — PROPAFENONE HYDROCHLORIDE 150 MG/1
TABLET, COATED ORAL
Qty: 180 TABLET | Refills: 0 | Status: SHIPPED | OUTPATIENT
Start: 2024-05-21

## 2024-05-21 NOTE — TELEPHONE ENCOUNTER
Requested Prescriptions     Pending Prescriptions Disp Refills    propafenone (RYTHMOL) 150 MG tablet [Pharmacy Med Name: Propafenone HCl 150 MG Oral Tablet] 180 tablet 0     Sig: TAKE 1 TABLET BY MOUTH IN THE MORNING AND 1 TABLET AT BEDTIME      [address]     Last OV:  2024     Next OV: 2024 NPSR    Last EK/15/2024     Last Filled: 2023 NPSR

## 2024-05-22 RX ORDER — APIXABAN 5 MG/1
TABLET, FILM COATED ORAL
Qty: 180 TABLET | Refills: 0 | Status: SHIPPED | OUTPATIENT
Start: 2024-05-22

## 2024-05-22 NOTE — TELEPHONE ENCOUNTER
Last ov:24 NPSR  Next ov:24 NPSR  Last EK/15/24  Last labs:10/08/2020  Last filled:   Disp Refills Start End    ELIQUIS 5 MG TABS tablet 180 tablet 2 2023 --    Sig: Take 1 tablet by mouth twice daily    Sent to pharmacy as: Eliquis 5 MG Oral Tablet (apixaban)    E-Prescribing Status: Receipt confirmed by pharmacy (2023  8:48 AM EDT)

## 2024-07-16 RX ORDER — AMLODIPINE BESYLATE 10 MG/1
TABLET ORAL
Qty: 90 TABLET | Refills: 3 | Status: SHIPPED | OUTPATIENT
Start: 2024-07-16

## 2024-07-16 NOTE — TELEPHONE ENCOUNTER
Received refill request for amLODIPine (NORVASC) 10 MG  from [facility] pharmacy.     Last OV: 2024 NPSR    Next OV: 8/15/2024 NPSR    Last EK/15/2024    Last Filled: 2023 NPSR

## 2024-07-21 PROBLEM — Z45.09 ENCOUNTER FOR ELECTRONIC ANALYSIS OF REVEAL EVENT RECORDER: Status: RESOLVED | Noted: 2019-05-08 | Resolved: 2024-07-21

## 2024-07-21 PROBLEM — R06.02 SHORTNESS OF BREATH: Status: RESOLVED | Noted: 2019-04-17 | Resolved: 2024-07-21

## 2024-07-21 PROBLEM — R00.1 BRADYCARDIA: Status: RESOLVED | Noted: 2022-12-28 | Resolved: 2024-07-21

## 2024-07-21 NOTE — PROGRESS NOTES
[facility]   Electrophysiology  DORCAS Powell  Attending EP: Dr. Flores    Date: 8/20/2024  I had the privilege of visiting Alvin Marie in the office.     Chief Complaint:   Chief Complaint   Patient presents with    3 Month Follow-Up     Pt states he had a fall 3 weeks ago and have a large hematoma on his calf. He states b/ he have had the eliquis, they had to give him a ultra sound     History of Present Illness: History obtained from patient and medical record.    Alvin Marie is 78 y.o. male with a past medical history of atrial fibrillation, HTN, HLD, DM, DANO, and obesity. Hx of multiple DCCV. S/p RFCA with PVI (10/5/20). S/p DCCV (8/10/23). He had recurrence requiring repeat cardioversion (5/15/24)    -Interval history: Today, Alvin Marie is being seen for routine follow up. He is doing well. He denies any recurrence of atrial arrhythmias. He states he has essentially stopped his caffeine intake (coffee). He did have a fall about 3 weeks ago that led to a calf bleed. He states he was standing on his mower to pick a peach from a tree in his yard. He states he had a large hematoma.     Denies having chest pain, palpitations, shortness of breath, orthopnea/PND, cough, or dizziness at the time of this visit.    With regard to medication therapy the patient has been compliant with prescribed regimen. They have tolerated therapy to date.     Allergies:  Allergies   Allergen Reactions    Amoxicillin Rash     Home Medications:  Prior to Visit Medications    Medication Sig Taking? Authorizing Provider   amLODIPine (NORVASC) 10 MG tablet Take 1 tablet by mouth once daily Yes Jamie Merida APRN - CNP   ELIQUIS 5 MG TABS tablet Take 1 tablet by mouth twice daily Yes Jamie Merida APRN - CNP   propafenone (RYTHMOL) 150 MG tablet TAKE 1 TABLET BY MOUTH IN THE MORNING AND 1 TABLET AT BEDTIME Yes Jamie Merida APRN - CNP   Azelastine-Fluticasone 137-50 MCG/ACT SUSP 1 Squirt by Nasal route in

## 2024-08-14 ENCOUNTER — OFFICE VISIT (OUTPATIENT)
Dept: PULMONOLOGY | Age: 79
End: 2024-08-14
Payer: MEDICARE

## 2024-08-14 VITALS
DIASTOLIC BLOOD PRESSURE: 60 MMHG | BODY MASS INDEX: 36.65 KG/M2 | HEART RATE: 58 BPM | OXYGEN SATURATION: 95 % | SYSTOLIC BLOOD PRESSURE: 136 MMHG | WEIGHT: 220 LBS | HEIGHT: 65 IN

## 2024-08-14 DIAGNOSIS — I48.0 PAF (PAROXYSMAL ATRIAL FIBRILLATION) (HCC): ICD-10-CM

## 2024-08-14 DIAGNOSIS — G47.33 OSA (OBSTRUCTIVE SLEEP APNEA): ICD-10-CM

## 2024-08-14 DIAGNOSIS — R06.02 SHORTNESS OF BREATH: Primary | ICD-10-CM

## 2024-08-14 DIAGNOSIS — E66.01 CLASS 2 SEVERE OBESITY DUE TO EXCESS CALORIES WITH SERIOUS COMORBIDITY AND BODY MASS INDEX (BMI) OF 35.0 TO 35.9 IN ADULT (HCC): ICD-10-CM

## 2024-08-14 PROCEDURE — 1036F TOBACCO NON-USER: CPT | Performed by: INTERNAL MEDICINE

## 2024-08-14 PROCEDURE — G8417 CALC BMI ABV UP PARAM F/U: HCPCS | Performed by: INTERNAL MEDICINE

## 2024-08-14 PROCEDURE — 3075F SYST BP GE 130 - 139MM HG: CPT | Performed by: INTERNAL MEDICINE

## 2024-08-14 PROCEDURE — G8427 DOCREV CUR MEDS BY ELIG CLIN: HCPCS | Performed by: INTERNAL MEDICINE

## 2024-08-14 PROCEDURE — 3078F DIAST BP <80 MM HG: CPT | Performed by: INTERNAL MEDICINE

## 2024-08-14 PROCEDURE — 1123F ACP DISCUSS/DSCN MKR DOCD: CPT | Performed by: INTERNAL MEDICINE

## 2024-08-14 PROCEDURE — 99214 OFFICE O/P EST MOD 30 MIN: CPT | Performed by: INTERNAL MEDICINE

## 2024-08-14 ASSESSMENT — SLEEP AND FATIGUE QUESTIONNAIRES
HOW LIKELY ARE YOU TO NOD OFF OR FALL ASLEEP WHILE LYING DOWN TO REST IN THE AFTERNOON WHEN CIRCUMSTANCES PERMIT: MODERATE CHANCE OF DOZING
HOW LIKELY ARE YOU TO NOD OFF OR FALL ASLEEP WHILE SITTING AND READING: WOULD NEVER DOZE
HOW LIKELY ARE YOU TO NOD OFF OR FALL ASLEEP WHILE SITTING AND TALKING TO SOMEONE: WOULD NEVER DOZE
HOW LIKELY ARE YOU TO NOD OFF OR FALL ASLEEP WHILE SITTING INACTIVE IN A PUBLIC PLACE: MODERATE CHANCE OF DOZING
ESS TOTAL SCORE: 6
HOW LIKELY ARE YOU TO NOD OFF OR FALL ASLEEP WHEN YOU ARE A PASSENGER IN A CAR FOR AN HOUR WITHOUT A BREAK: WOULD NEVER DOZE
HOW LIKELY ARE YOU TO NOD OFF OR FALL ASLEEP WHILE WATCHING TV: MODERATE CHANCE OF DOZING
HOW LIKELY ARE YOU TO NOD OFF OR FALL ASLEEP WHILE SITTING QUIETLY AFTER LUNCH WITHOUT ALCOHOL: WOULD NEVER DOZE
HOW LIKELY ARE YOU TO NOD OFF OR FALL ASLEEP IN A CAR, WHILE STOPPED FOR A FEW MINUTES IN TRAFFIC: WOULD NEVER DOZE

## 2024-08-14 NOTE — PROGRESS NOTES
PULMONARY OFFICE FOLLOW UP NOTE    REASON FOR VISIT:   Chief Complaint   Patient presents with    Sleep Apnea         DATE OF VISIT: 8/14/2024     HISTORY OF PRESENT ILLNESS: 78 y.o. year old male comes into the pulmonary office for a follow-up.  Reports stable breathing.  Has been taking Flonase nasal spray off-and-on.  Occasionally takes Afrin nasal spray in the middle of the night to decongest his nose.  Using his CPAP therapy regularly.  Was able to get a new machine.  Denies any mask leakage or pressure intolerance.  Patient reports that he has been tracking his sleep with Apple Watch.  Watch reports that he is sleeping only 6 hours every night.  He is not getting much of REM sleep.  Weight remains stable.  Did have a fall 1 month ago and since then has not been regularly exercising.      Previously: Patient reports that for last 2 weeks he has been having sinus congestion, postnasal drip and intermittent cough.  Cough is occasionally productive of clear expectoration.  No shortness of breath or wheezing.  No chest pain or chest tightness.  No fevers or night sweats.  Has been using Claritin 24 hours, which is only partially effective.  Has not been using his Flonase nasal spray.  Occasionally uses decongestant nasal spray at nighttime.  Continues to use his CPAP therapy regularly every night.  Denies any mask leakage or pressure intolerance.  His machine has become 5 years old and he wants to get a new machine.  Weight remains stable. Patient reports that about 2 weeks ago he suffered with acute COVID-19 infection.  He was reporting cough, shortness of breath, chest congestion and generalized malaise.  He was given Lagevrio by his family doctor which he took for 5 days.  After this he started having sinus congestion and was given azithromycin for 5 days. While most of his symptoms subsided, he still complaints of cough which is intermittent but persistent. Productive of whitish to yellowish

## 2024-08-20 ENCOUNTER — OFFICE VISIT (OUTPATIENT)
Dept: CARDIOLOGY CLINIC | Age: 79
End: 2024-08-20
Payer: MEDICARE

## 2024-08-20 ENCOUNTER — NURSE ONLY (OUTPATIENT)
Dept: CARDIOLOGY CLINIC | Age: 79
End: 2024-08-20

## 2024-08-20 VITALS
WEIGHT: 218.6 LBS | OXYGEN SATURATION: 95 % | SYSTOLIC BLOOD PRESSURE: 132 MMHG | BODY MASS INDEX: 36.42 KG/M2 | HEART RATE: 62 BPM | HEIGHT: 65 IN | DIASTOLIC BLOOD PRESSURE: 62 MMHG

## 2024-08-20 DIAGNOSIS — I48.0 PAF (PAROXYSMAL ATRIAL FIBRILLATION) (HCC): Primary | ICD-10-CM

## 2024-08-20 DIAGNOSIS — I48.4 ATYPICAL ATRIAL FLUTTER (HCC): ICD-10-CM

## 2024-08-20 DIAGNOSIS — E66.01 CLASS 2 SEVERE OBESITY DUE TO EXCESS CALORIES WITH SERIOUS COMORBIDITY AND BODY MASS INDEX (BMI) OF 35.0 TO 35.9 IN ADULT (HCC): ICD-10-CM

## 2024-08-20 DIAGNOSIS — I10 ESSENTIAL HYPERTENSION: ICD-10-CM

## 2024-08-20 PROCEDURE — 99214 OFFICE O/P EST MOD 30 MIN: CPT | Performed by: NURSE PRACTITIONER

## 2024-08-20 PROCEDURE — G8417 CALC BMI ABV UP PARAM F/U: HCPCS | Performed by: NURSE PRACTITIONER

## 2024-08-20 PROCEDURE — 1036F TOBACCO NON-USER: CPT | Performed by: NURSE PRACTITIONER

## 2024-08-20 PROCEDURE — 3078F DIAST BP <80 MM HG: CPT | Performed by: NURSE PRACTITIONER

## 2024-08-20 PROCEDURE — 93000 ELECTROCARDIOGRAM COMPLETE: CPT | Performed by: NURSE PRACTITIONER

## 2024-08-20 PROCEDURE — 1123F ACP DISCUSS/DSCN MKR DOCD: CPT | Performed by: NURSE PRACTITIONER

## 2024-08-20 PROCEDURE — G8427 DOCREV CUR MEDS BY ELIG CLIN: HCPCS | Performed by: NURSE PRACTITIONER

## 2024-08-20 PROCEDURE — 3075F SYST BP GE 130 - 139MM HG: CPT | Performed by: NURSE PRACTITIONER

## 2024-08-20 NOTE — PROGRESS NOTES
Patient comes in for their OV with NPSR.  Patient has Medtronic LNQ22 LINQ II Implant on 10/12/2022 with Dr. Flores.     [facility] Interrogation shows the Battery Status is GOOD. Since 5/8/2024 AF with a 1.6% [place]. 0.2% PVC burden. Implanted for AF management. Patient remains Eliquis and propafenone. Please see interrogation scanned under Media tab for more detail. We will continue to follow the Patient remotely.

## 2024-08-28 PROCEDURE — 93298 REM INTERROG DEV EVAL SCRMS: CPT | Performed by: INTERNAL MEDICINE

## 2024-09-03 RX ORDER — APIXABAN 5 MG/1
TABLET, FILM COATED ORAL
Qty: 180 TABLET | Refills: 3 | Status: SHIPPED | OUTPATIENT
Start: 2024-09-03

## 2024-09-03 NOTE — TELEPHONE ENCOUNTER
Received refill request for Eliquis from [facility] pharmacy.    Last ov:08/20/2024 NPSR    Last labs:10/06/2020 BMP    Last Refill:05/22/2024     Next appointment:On recall list for 02/16/2025 NPSR

## 2024-09-10 NOTE — H&P
Admit for repeat    [facility]   Electrophysiology        Chief Complaint   Patient presents with    Follow-up     5 month    Atrial Fibrillation    Fatigue    Irregular Heart Beat        HPI: Ranjeet Bragg is a 76 y.o. seen today at the request of Dr Yaneli Ford. He states he was seen at [facility] ER 2/23/19 with heart racing and elevated blood pressure and shortness of breath . He was in atrial fibrillation and placed on Metoprolol and Xarelto. He then saw his pcp who changed Xarelto to Coumadin due to cost. Other history includes hypertension, hyperlipidemia, diabetes, and sleep apnea. He is newly treated for sleep apnea with C-pap. He does not smoke. He is physically active. He recently moved here from [place].     4/29/19:  ILR Implanted  5/31/19   DCCV    Interval history:     he has been in Atrial fibrillation for 2 months and not feeling well. Past Medical History:   Diagnosis Date    [facility] disorder [facility] [address]     Diabetes mellitus ([facility] [address]     Hyperlipidemia     Hypertension         No past surgical history on file. Allergies: Allergies   Allergen Reactions    Amoxicillin Rash       Social History:   reports that he has never smoked. He has never used smokeless tobacco. He reports current alcohol use of about 3.0 standard drinks of alcohol per week. He reports that he does not use drugs. Family History:  family history includes Arrhythmia in his mother; Heart Attack in his maternal uncle; High Cholesterol in his father; Hypertension in his father. Reviewed. Denies family history of sudden cardiac death, arrhythmia, premature CAD    Review of System:  All other systems reviewed and are negative except for that noted above.  Pertinent negatives are:   General: negative for fever, chills   Ophthalmic ROS: negative for - eye pain or loss of vision  ENT ROS: negative for - headaches, sore throat   Respiratory: negative for - cough, sputum  Cardiovascular: Reviewed in HPI  Gastrointestinal: negative for - abdominal pain, diarrhea, N/V  Hematology: negative for - bleeding, blood clots, bruising or jaundice  Genito-Urinary:  negative for - Dysuria or incontinence  Musculoskeletal: negative for - Joint swelling, muscle pain  Neurological: negative for - confusion, dizziness, headaches   Psychiatric: No anxiety, no depression. Dermatological: negative for - rash    Physical Examination:  Vitals:    20 1349   BP: 132/76   Pulse: 103   Resp: 20      Wt Readings from Last 3 Encounters:   20 237 lb (107.5 kg)   20 232 lb (105.2 kg)   19 226 lb (102.5 kg)       · Constitutional: Oriented. No distress. obese  · Head: Normocephalic and atraumatic. · Mouth/Throat: Oropharynx is clear and moist.   · Eyes: Conjunctivae normal. EOM are normal.   · Neck: Neck supple. No rigidity. No JVD present. · Cardiovascular: Normal rate, irregular rhythm, S1&S2. · Pulmonary/Chest: Bilateral respiratory sounds. No wheezes, No rhonchi. · Abdominal: Soft. Bowel sounds present. No distension, No tenderness. · Musculoskeletal: No tenderness. No edema    · Lymphadenopathy: Has no cervical adenopathy. · Neurological: Alert and oriented. Cranial nerve appears intact, No Gross deficit   · Skin: Skin is warm and dry. No rash noted. · Psychiatric: Has a normal behavior       Labs, diagnostic and imaging results reviewed. Reviewed. Lab Results   Component Value Date    CREATININE 0.9 2019       EC20    Afib    Nuclear Stress 19  Summary    Normal LV size with hyperdynamic systolic function.    Left ventricular ejection fraction of 73 %.    There is normal isotope uptake at stress and rest.    There is no evidence of myocardial ischemia or scar. Echo: 3/1/19:   EF 50-55%. The left ventricular function is low normal.  There is mild global hypokinesis of the left ventricle. The diastolic function is impaired and classified as Grade 2  (psuedonormalization).   The left atrium is moderately discussed. High risk for stroke and thromboembolism. Anticoagulation is recommended. I discussed anticoagulation to decrease the risk of thromboembolic events including stroke. Benefits and alternatives were discussed with patient. Risk of bleeding was discussed. Patient verbalized understanding. On Coumadin d/t cost     The risks, benefits and alternatives of the ablation procedure were discussed with the patient. The risks including, but not limited to, the risks of bleeding, infection, radiation exposure, injury to vascular, cardiac and surrounding structures (including pneumothorax), stroke, cardiac perforation, tamponade, need for emergent open heart surgery, need for pacemaker implantation, Injury to the phrenic nerve, injury to the esophagus, myocardial infarction and death were discussed in detail. The patient opted to proceed with the ablation. S/p ILR  The CIED was interrogated and programmed and I supervised and reviewed all the data. All findings and changes are in device interrogation sheat and reflect my personal interpretation and changes and is scanned to Epic. Atrial fibrillation since May 2020, 42.5% overall  HTN  Vitals:    07/08/20 1349   BP: 132/76   Pulse: 103   Resp: 20     -Home BP monitoring encourage with a BP goal <130/80      Other hyperlipidemia             - follows with pcp. On statin   On statin    Sleep apnea                using cpap nightly      Obesity  Body mass index is 37.68 kg/m². - Excessive weight is complicating assessment and treatment. It is placing patient at risk for multiple co-morbidities as well as early death and contributing to the patient's presentation. - discussed weight management with diet and exercise          Plan:    Cardioversion      NOTE: This report was transcribed using voice recognition software. Every effort was made to ensure accuracy, however, inadvertent computerized transcription errors may be present.        Tawanna Aguiar MD, Kasandra Babinski [address] [facility]   Office: [phone]

## 2024-09-30 RX ORDER — PROPAFENONE HYDROCHLORIDE 150 MG/1
TABLET, COATED ORAL
Qty: 180 TABLET | Refills: 0 | Status: SHIPPED | OUTPATIENT
Start: 2024-09-30

## 2024-10-05 PROCEDURE — 93298 REM INTERROG DEV EVAL SCRMS: CPT | Performed by: INTERNAL MEDICINE

## 2024-11-08 ENCOUNTER — TELEPHONE (OUTPATIENT)
Dept: CARDIOLOGY CLINIC | Age: 79
End: 2024-11-08

## 2024-11-08 ENCOUNTER — NURSE ONLY (OUTPATIENT)
Dept: CARDIOLOGY CLINIC | Age: 79
End: 2024-11-08
Payer: MEDICARE

## 2024-11-08 DIAGNOSIS — I48.0 PAF (PAROXYSMAL ATRIAL FIBRILLATION) (HCC): Primary | ICD-10-CM

## 2024-11-08 PROCEDURE — 93000 ELECTROCARDIOGRAM COMPLETE: CPT | Performed by: NURSE PRACTITIONER

## 2024-11-08 NOTE — TELEPHONE ENCOUNTER
NPRJ and PAMANATOLY both went over EKG and do not believe he is in atrial fibrillation. HR is however elevated.   Per their recommendation, he should restart the lopressor 12.5 mg BID for a few days to see if this gets his HR under control and decreases his palpitations.  Called to relay information to patient. Also discussed the importance of monitoring his HR during this to ensure he does not become bradycardic. Patient v/u and is agreeable.

## 2024-11-08 NOTE — TELEPHONE ENCOUNTER
Pt states he is unable to send transmission. I asked if he could get EKG and he said he is right by [place] office, I scheduled for a 3pm EKG at  to confirm Pt is in Afib. Pt v/u.

## 2024-11-08 NOTE — TELEPHONE ENCOUNTER
Pt states he is back in afib as of yesterday afternoon. Pt would like to have an appt next Wednesday or sooner if our provider feels he needs to be seen sooner. Please advise pt.

## 2024-11-09 PROCEDURE — 93298 REM INTERROG DEV EVAL SCRMS: CPT | Performed by: INTERNAL MEDICINE

## 2024-11-09 NOTE — TELEPHONE ENCOUNTER
Likely atrial tachycardia on EKG. Call him on Monday. If HR still elevated, schedule for cardioversion with Dr. Flores

## 2025-01-03 RX ORDER — PROPAFENONE HYDROCHLORIDE 150 MG/1
TABLET, COATED ORAL
Qty: 180 TABLET | Refills: 0 | Status: SHIPPED | OUTPATIENT
Start: 2025-01-03

## 2025-01-03 NOTE — TELEPHONE ENCOUNTER
Received refill request for : propafenone (RYTHMOL) 150 MG tablet  from [facility] pharmacy.     Last OV: 8/20/2024 NPSR    Next OV: None    Last Labs: 11/8/2024 EKG    Last Filled: 9/30/2024 NPSR

## 2025-01-07 NOTE — PROGRESS NOTES
[facility]   Electrophysiology  DORCAS Powell  Attending EP: Dr. Flores    Date: 1/8/2025  I had the privilege of visiting Alvin Marie in the office.     Chief Complaint:   Chief Complaint   Patient presents with    Tachycardia    Follow-up    Device Check    Shortness of Breath     When walking     History of Present Illness: History obtained from patient and medical record.    Alvin Marie is 79 y.o. male with a past medical history of atrial fibrillation, HTN, HLD, DM, DANO, and obesity. Hx of multiple DCCV. S/p RFCA with PVI (10/5/20). S/p DCCV (8/10/23). He had recurrence requiring repeat cardioversion (5/15/24)    -Interval history: Today, Alvin Marie is being seen for routine follow up. He is back in atrial fibrillation. He has felt worse in the last month. He is more tired and SOB. He initially began feeling more lethargic back in November. We discussed his options for atrial fibrillation/flutter. He is unsure when his last labs were done    Denies having chest pain, palpitations, shortness of breath, orthopnea/PND, cough, or dizziness at the time of this visit.    With regard to medication therapy the patient has been compliant with prescribed regimen. They have tolerated therapy to date.     Allergies:  Allergies   Allergen Reactions    Amoxicillin Rash     Home Medications:  Prior to Visit Medications    Medication Sig Taking? Authorizing Provider   propafenone (RYTHMOL) 150 MG tablet TAKE 1 TABLET BY MOUTH IN THE MORNING AND 1 AT BEDTIME Yes Jamie Merida APRN - CNP   metoprolol tartrate (LOPRESSOR) 25 MG tablet Take 1 tablet by mouth 2 times daily Yes Jamie Merida APRN - CNP   apixaban (ELIQUIS) 5 MG TABS tablet Take 1 tablet by mouth twice daily Yes Jamie Merida APRN - CNP   amLODIPine (NORVASC) 10 MG tablet Take 1 tablet by mouth once daily Yes Jamie Merida APRN - CNP   Azelastine-Fluticasone 137-50 MCG/ACT SUSP 1 Squirt by Nasal route in the morning and at

## 2025-01-08 ENCOUNTER — NURSE ONLY (OUTPATIENT)
Dept: CARDIOLOGY CLINIC | Age: 80
End: 2025-01-08

## 2025-01-08 ENCOUNTER — TELEPHONE (OUTPATIENT)
Dept: CARDIOLOGY CLINIC | Age: 80
End: 2025-01-08

## 2025-01-08 ENCOUNTER — OFFICE VISIT (OUTPATIENT)
Dept: CARDIOLOGY CLINIC | Age: 80
End: 2025-01-08
Payer: MEDICARE

## 2025-01-08 VITALS
OXYGEN SATURATION: 95 % | HEART RATE: 82 BPM | HEIGHT: 65 IN | WEIGHT: 222.2 LBS | DIASTOLIC BLOOD PRESSURE: 74 MMHG | SYSTOLIC BLOOD PRESSURE: 126 MMHG | BODY MASS INDEX: 37.02 KG/M2

## 2025-01-08 DIAGNOSIS — Z95.818 STATUS POST PLACEMENT OF IMPLANTABLE LOOP RECORDER: ICD-10-CM

## 2025-01-08 DIAGNOSIS — E78.49 OTHER HYPERLIPIDEMIA: ICD-10-CM

## 2025-01-08 DIAGNOSIS — I10 ESSENTIAL HYPERTENSION: ICD-10-CM

## 2025-01-08 DIAGNOSIS — E66.812 CLASS 2 SEVERE OBESITY DUE TO EXCESS CALORIES WITH SERIOUS COMORBIDITY AND BODY MASS INDEX (BMI) OF 35.0 TO 35.9 IN ADULT: ICD-10-CM

## 2025-01-08 DIAGNOSIS — I48.0 PAF (PAROXYSMAL ATRIAL FIBRILLATION) (HCC): Primary | ICD-10-CM

## 2025-01-08 DIAGNOSIS — I48.19 PERSISTENT ATRIAL FIBRILLATION (HCC): ICD-10-CM

## 2025-01-08 DIAGNOSIS — E66.01 CLASS 2 SEVERE OBESITY DUE TO EXCESS CALORIES WITH SERIOUS COMORBIDITY AND BODY MASS INDEX (BMI) OF 35.0 TO 35.9 IN ADULT: ICD-10-CM

## 2025-01-08 PROCEDURE — G8427 DOCREV CUR MEDS BY ELIG CLIN: HCPCS | Performed by: NURSE PRACTITIONER

## 2025-01-08 PROCEDURE — 3074F SYST BP LT 130 MM HG: CPT | Performed by: NURSE PRACTITIONER

## 2025-01-08 PROCEDURE — 1159F MED LIST DOCD IN RCRD: CPT | Performed by: NURSE PRACTITIONER

## 2025-01-08 PROCEDURE — G8417 CALC BMI ABV UP PARAM F/U: HCPCS | Performed by: NURSE PRACTITIONER

## 2025-01-08 PROCEDURE — 1126F AMNT PAIN NOTED NONE PRSNT: CPT | Performed by: NURSE PRACTITIONER

## 2025-01-08 PROCEDURE — 3078F DIAST BP <80 MM HG: CPT | Performed by: NURSE PRACTITIONER

## 2025-01-08 PROCEDURE — 1036F TOBACCO NON-USER: CPT | Performed by: NURSE PRACTITIONER

## 2025-01-08 PROCEDURE — 1160F RVW MEDS BY RX/DR IN RCRD: CPT | Performed by: NURSE PRACTITIONER

## 2025-01-08 PROCEDURE — 1123F ACP DISCUSS/DSCN MKR DOCD: CPT | Performed by: NURSE PRACTITIONER

## 2025-01-08 PROCEDURE — M1308 PR FLU IMMUNIZE NO ADMIN: HCPCS | Performed by: NURSE PRACTITIONER

## 2025-01-08 PROCEDURE — 99215 OFFICE O/P EST HI 40 MIN: CPT | Performed by: NURSE PRACTITIONER

## 2025-01-08 RX ORDER — AMLODIPINE BESYLATE 10 MG/1
5 TABLET ORAL DAILY
Qty: 90 TABLET | Refills: 3
Start: 2025-01-08

## 2025-01-08 NOTE — TELEPHONE ENCOUNTER
Called patient and no answer and lmom to call back to get scheduled for procedures. Offer 1/16 at 730am for DCCV and then 2/20 at 1030am fpr Abl

## 2025-01-08 NOTE — PATIENT INSTRUCTIONS
Schedule cardioversion soon, then ablation in next few months      ATRIAL FIBRILLATION ABLATION INFORMATION    Our  will be contacting you with a date and time for your procedure    The morning of your ablation you will need to check in at the registration desk in the main lobby.     PRE-PROCEDURE INSTRUCTIONS -   -Do not eat or drink anything after midnight the night before your ablation.  -You will need to have a responsible adult to drive you home.  -Your nurse will provide you with discharge instructions.  -You will need to hold your Eliquis for the day before and morning of   -If you are taking a diuretic (water pill) please hold the morning of the procedure  -If you take long acting insulin, take 1/2 the dose the evening before or morning of depending on when you take your dosage.  -You may take all of your other medications with a sip of water.    You will be scheduled for a 6-8 week follow up with cardiology.  This will be done prior to your discharge instructions.    If you have any questions regarding the procedure itself or medications, please call [phone] and ask to speak to an EP nurse.

## 2025-01-08 NOTE — PROGRESS NOTES
Patient comes in for their OV with NPSR.  Patient has Medtronic LNQ22 LINQ II Implant on 10/12/2022 with Dr. Flores.     ANGELY utilized to pull Interrogation from device today for review. NPSR reviewed interrogation today. Implanted for AF management. Patient remains Eliquis, metoprolol and propafenone. Please see interrogation scanned under Media tab for more detail. We will continue to follow the Patient remotely.

## 2025-01-08 NOTE — TELEPHONE ENCOUNTER
Please schedule for cardioversion in next 1-2 weeks, then EP study/AF ablation soon after with Dr. Flores    ATRIAL FIBRILLATION ABLATION INFORMATION    The morning of your ablation you will need to check in at the registration desk in the main lobby.     PRE-PROCEDURE INSTRUCTIONS -   -Do not eat or drink anything after midnight the night before your ablation.  -You will need to have a responsible adult to drive you home.  -Your nurse will provide you with discharge instructions.  -You will need to hold your Eliquis for the day before and morning of   -If you are taking a diuretic (water pill) please hold the morning of the procedure  -If you take long acting insulin, take 1/2 the dose the evening before or morning of depending on when you take your dosage.  -You may take all of your other medications with a sip of water.    You will be scheduled for a 6-8 week follow up with cardiology.  This will be done prior to your discharge instructions.

## 2025-01-13 NOTE — TELEPHONE ENCOUNTER
Date Of Procedure:  01/23/25     Time Of Arrival: 930am     Procedure Time: 1030am        Called and spoke to patient and he is agreeable to the date and time. Reviewed the Pre-Procedure instructions and they verbalized understanding. Encouraged to call with any questions or concerns.       Published on [employer] / emailed to Cath lab / note in chart / scheduled in Epic/Cupid/Carto

## 2025-01-23 ENCOUNTER — HOSPITAL ENCOUNTER (OUTPATIENT)
Age: 80
Discharge: HOME | End: 2025-01-23
Attending: INTERNAL MEDICINE | Admitting: INTERNAL MEDICINE
Payer: MEDICARE

## 2025-01-23 VITALS
WEIGHT: 227 LBS | SYSTOLIC BLOOD PRESSURE: 110 MMHG | RESPIRATION RATE: 17 BRPM | TEMPERATURE: 97.9 F | HEIGHT: 65 IN | HEART RATE: 62 BPM | OXYGEN SATURATION: 99 % | BODY MASS INDEX: 37.82 KG/M2 | DIASTOLIC BLOOD PRESSURE: 70 MMHG

## 2025-01-23 DIAGNOSIS — I48.19 PERSISTENT ATRIAL FIBRILLATION (HCC): ICD-10-CM

## 2025-01-23 LAB
EKG ATRIAL RATE: 61 BPM
EKG ATRIAL RATE: 94 BPM
EKG DIAGNOSIS: NORMAL
EKG DIAGNOSIS: NORMAL
EKG P AXIS: 75 DEGREES
EKG P-R INTERVAL: 226 MS
EKG P-R INTERVAL: 316 MS
EKG Q-T INTERVAL: 360 MS
EKG Q-T INTERVAL: 432 MS
EKG QRS DURATION: 118 MS
EKG QRS DURATION: 122 MS
EKG QTC CALCULATION (BAZETT): 434 MS
EKG QTC CALCULATION (BAZETT): 442 MS
EKG R AXIS: 39 DEGREES
EKG R AXIS: 41 DEGREES
EKG T AXIS: 32 DEGREES
EKG T AXIS: 61 DEGREES
EKG VENTRICULAR RATE: 61 BPM
EKG VENTRICULAR RATE: 91 BPM

## 2025-01-23 PROCEDURE — 99152 MOD SED SAME PHYS/QHP 5/>YRS: CPT | Performed by: INTERNAL MEDICINE

## 2025-01-23 PROCEDURE — 92960 CARDIOVERSION ELECTRIC EXT: CPT | Performed by: INTERNAL MEDICINE

## 2025-01-23 PROCEDURE — 92960 CARDIOVERSION ELECTRIC EXT: CPT

## 2025-01-23 PROCEDURE — 2500000003 HC RX 250 WO HCPCS: Performed by: INTERNAL MEDICINE

## 2025-01-23 PROCEDURE — 7100000010 HC PHASE II RECOVERY - FIRST 15 MIN: Performed by: INTERNAL MEDICINE

## 2025-01-23 PROCEDURE — 7100000011 HC PHASE II RECOVERY - ADDTL 15 MIN: Performed by: INTERNAL MEDICINE

## 2025-01-23 PROCEDURE — 93005 ELECTROCARDIOGRAM TRACING: CPT

## 2025-01-23 RX ADMIN — METHOHEXITAL SODIUM 50 MG: 500 INJECTION, POWDER, LYOPHILIZED, FOR SOLUTION INTRAMUSCULAR; INTRAVENOUS; RECTAL at 09:53

## 2025-01-23 NOTE — PROCEDURES
[facility]     Electrophysiology Procedure Note       Date of Procedure: 1/23/2025  Patient's Name: Alvin Marie  YOB: 1945   Medical Record Number: [id number]  Procedure Performed by: Gabriel Flores MD    Procedures performed:  IV sedation.    External Electrical cardioversion   Mallampati3  ASA 3    Indication of the procedure: Persistent atrial flutter /atrial tachcyardia     Details of procedure:   The patient was brought to the cath lab area in a fasting and non-sedated state. The risks, benefits and alternatives of the procedure were discussed with the patient. The patient opted to proceed with the procedure. Written informed consent was signed and placed in the chart.  A timeout protocol was completed to identify the patient and the procedure being performed.       I pushed 50 mg Brevital.An independent trained observer pushed medications and/or monitored patient  at my direction.   We monitored the patient's level of consciousness and vital signs/physiologic status throughout the procedure duration (see start and stop times below).  Sedation:  0945  Sedation start: 0945  Sedation stop: 1000     Patient is on chronic anticoagulation therapy.   Then we used Brevital for sedation and electrical DC cardioversion was perfomred using 200J, synchronized shock. Patient was converted to sinus rhythm at 58 bpm. The patient tolerated the procedure well and there were no complications.     Conclusion:   Successful external DC cardioversion of atrial  flutter /tachycardia.     Plan:   The patient can be discharged if remains stable. Will continue with medical therapy.   Ablation if recurrent

## 2025-01-23 NOTE — H&P
[facility]   Electrophysiology    Chief Complaint:   Chief Complaint   Patient presents with    Tachycardia    Follow-up    Device Check    Shortness of Breath     When walking     History of Present Illness: History obtained from patient and medical record.    Alvin Marie is 79 y.o. male with a past medical history of atrial fibrillation, HTN, HLD, DM, DANO, and obesity. Hx of multiple DCCV. S/p RFCA with PVI (10/5/20). S/p DCCV (8/10/23). He had recurrence requiring repeat cardioversion (5/15/24)    -Interval history: Today, Alvin Marie is being seen for routine follow up. He is back in atrial fibrillation. He has felt worse in the last month. He is more tired and SOB. He initially began feeling more lethargic back in November. We discussed his options for atrial fibrillation/flutter. He is unsure when his last labs were done    Denies having chest pain, palpitations, shortness of breath, orthopnea/PND, cough, or dizziness at the time of this visit.    With regard to medication therapy the patient has been compliant with prescribed regimen. They have tolerated therapy to date.     Allergies:  Allergies   Allergen Reactions    Amoxicillin Rash     Home Medications:  Prior to Visit Medications    Medication Sig Taking? Authorizing Provider   propafenone (RYTHMOL) 150 MG tablet TAKE 1 TABLET BY MOUTH IN THE MORNING AND 1 AT BEDTIME Yes Jamie Merida APRN - CNP   metoprolol tartrate (LOPRESSOR) 25 MG tablet Take 1 tablet by mouth 2 times daily Yes Jamie Merida APRN - CNP   apixaban (ELIQUIS) 5 MG TABS tablet Take 1 tablet by mouth twice daily Yes Jamie Merida APRN - CNP   amLODIPine (NORVASC) 10 MG tablet Take 1 tablet by mouth once daily Yes Jamie Merida APRN - CNP   Azelastine-Fluticasone 137-50 MCG/ACT SUSP 1 Squirt by Nasal route in the morning and at bedtime Yes Flo Lane MD   albuterol sulfate HFA (PROVENTIL;VENTOLIN;PROAIR) 108 (90 Base) MCG/ACT inhaler INHALE 2 PUFFS BY MOUTH 4

## 2025-01-27 LAB — ECHO BSA: 2.17 M2

## 2025-02-10 RX ORDER — AMLODIPINE BESYLATE 10 MG/1
10 TABLET ORAL DAILY
Qty: 90 TABLET | Refills: 0 | OUTPATIENT
Start: 2025-02-10

## 2025-02-12 RX ORDER — AMLODIPINE BESYLATE 10 MG/1
5 TABLET ORAL DAILY
Qty: 90 TABLET | Refills: 3 | Status: SHIPPED | OUTPATIENT
Start: 2025-02-12

## 2025-02-12 NOTE — TELEPHONE ENCOUNTER
Requested Prescriptions     Pending Prescriptions Disp Refills    amLODIPine (NORVASC) 10 MG tablet 90 tablet 3     Sig: Take 0.5 tablets by mouth daily      Last OV:  1/8/2025 NPSR    Next OV: 5/9/2025 NPSR    Last Labs: 08/07/2024 CBC    Last Filled: 01/08/2025 NPSR      Spoke to pharmacy, Rx was cancelled.    Called pt he states he is running out.

## 2025-02-12 NOTE — TELEPHONE ENCOUNTER
Medication Refill    Medication needing refilled:  amLODIPine     Dosage of the medication:   10mg    How are you taking this medication (QD, BID, TID, QID, PRN):   Take 0.5 tablets by mouth daily     30 or 90 day supply called in: 90    When will you run out of your medication:    Which Pharmacy are we sending the medication to?:     [facility] [id number] [id number]  Phone: [phone]   Fax: [phone]

## 2025-02-23 PROCEDURE — 93298 REM INTERROG DEV EVAL SCRMS: CPT | Performed by: INTERNAL MEDICINE

## 2025-04-01 RX ORDER — PROPAFENONE HYDROCHLORIDE 150 MG/1
TABLET, COATED ORAL
Qty: 180 TABLET | Refills: 1 | Status: SHIPPED | OUTPATIENT
Start: 2025-04-01

## 2025-04-03 PROCEDURE — 93298 REM INTERROG DEV EVAL SCRMS: CPT | Performed by: INTERNAL MEDICINE

## 2025-04-09 NOTE — PROGRESS NOTES
10/09/2020    INR 1.91 (H) 10/09/2020       EC2025  - NSR. Rate 64, , QTc 392    Echo:    Normal left ventricle size, wall thickness and systolic function with an estimated ejection fraction of 60%. No regional wall motion abnormalities are seen. E/e\"= 14. Diastolic filling parameters suggests normal diastolic function. Mild mitral regurgitation. The left atrium is mildly dilated. Mild to moderate tricuspid regurgitation. RVSP 49mmHg. PHTN. Trivial pulmonic regurgitation.   IVC size is normal (<2.1cm) and collapses > 50% with respiration consistent with normal RA pressure (3mmHg).    GXT:    Normal LV size with hyperdynamic systolic function.    Left ventricular ejection fraction of 73 %.    There is normal isotope uptake at stress and rest.    There is no evidence of myocardial ischemia or scar.     Diet & Exercise:  The patient is counseled to follow a low salt diet to assure blood pressure remains controlled for cardiovascular risk factor modification  The patient is counseled to avoid excess caffeine, and energy drinks as this may exacerbated ectopy and arrhythmia  The patient is counseled to lose weight to control cardiovascular risk factors  Exercise program discussed: To improve overall cardiovascular health, the patient is instructed to increase cardiovascular related activities with a goal of 150 min/week of moderate level activity or 10,000 steps per day. Encouraged to perform as much activity as tolerated    I have addressed the patient's cardiac risk factors and adjusted pharmacologic treatment as needed. In addition, I have reinforced the need for patient directed risk factor modification.    I independently reviewed the device check interrogation and ECG    All questions and concerns were addressed with the patient. Alternatives to treatment were discussed.     Thank you for allowing to us to participate in the care of DANNIE James-CNP  [facility]

## 2025-04-13 ASSESSMENT — SLEEP AND FATIGUE QUESTIONNAIRES
HOW LIKELY ARE YOU TO NOD OFF OR FALL ASLEEP WHILE LYING DOWN TO REST IN THE AFTERNOON WHEN CIRCUMSTANCES PERMIT: SLIGHT CHANCE OF DOZING
HOW LIKELY ARE YOU TO NOD OFF OR FALL ASLEEP WHILE WATCHING TV: SLIGHT CHANCE OF DOZING
HOW LIKELY ARE YOU TO NOD OFF OR FALL ASLEEP WHEN YOU ARE A PASSENGER IN A CAR FOR AN HOUR WITHOUT A BREAK: SLIGHT CHANCE OF DOZING
HOW LIKELY ARE YOU TO NOD OFF OR FALL ASLEEP IN A CAR, WHILE STOPPED FOR A FEW MINUTES IN TRAFFIC: WOULD NEVER DOZE
HOW LIKELY ARE YOU TO NOD OFF OR FALL ASLEEP WHILE WATCHING TV: SLIGHT CHANCE OF DOZING
HOW LIKELY ARE YOU TO NOD OFF OR FALL ASLEEP WHILE SITTING INACTIVE IN A PUBLIC PLACE: WOULD NEVER DOZE
HOW LIKELY ARE YOU TO NOD OFF OR FALL ASLEEP WHILE SITTING AND TALKING TO SOMEONE: WOULD NEVER DOZE
HOW LIKELY ARE YOU TO NOD OFF OR FALL ASLEEP WHILE LYING DOWN TO REST IN THE AFTERNOON WHEN CIRCUMSTANCES PERMIT: SLIGHT CHANCE OF DOZING
HOW LIKELY ARE YOU TO NOD OFF OR FALL ASLEEP WHILE SITTING QUIETLY AFTER LUNCH WITHOUT ALCOHOL: SLIGHT CHANCE OF DOZING
HOW LIKELY ARE YOU TO NOD OFF OR FALL ASLEEP IN A CAR, WHILE STOPPED FOR A FEW MINUTES IN TRAFFIC: WOULD NEVER DOZE
HOW LIKELY ARE YOU TO NOD OFF OR FALL ASLEEP WHILE SITTING AND READING: SLIGHT CHANCE OF DOZING
HOW LIKELY ARE YOU TO NOD OFF OR FALL ASLEEP WHILE SITTING AND TALKING TO SOMEONE: WOULD NEVER DOZE
ESS TOTAL SCORE: 5
HOW LIKELY ARE YOU TO NOD OFF OR FALL ASLEEP WHILE SITTING AND READING: SLIGHT CHANCE OF DOZING
HOW LIKELY ARE YOU TO NOD OFF OR FALL ASLEEP WHILE SITTING INACTIVE IN A PUBLIC PLACE: WOULD NEVER DOZE
HOW LIKELY ARE YOU TO NOD OFF OR FALL ASLEEP WHILE SITTING QUIETLY AFTER LUNCH WITHOUT ALCOHOL: SLIGHT CHANCE OF DOZING
HOW LIKELY ARE YOU TO NOD OFF OR FALL ASLEEP WHEN YOU ARE A PASSENGER IN A CAR FOR AN HOUR WITHOUT A BREAK: SLIGHT CHANCE OF DOZING

## 2025-04-14 ENCOUNTER — OFFICE VISIT (OUTPATIENT)
Dept: PULMONOLOGY | Age: 80
End: 2025-04-14
Payer: MEDICARE

## 2025-04-14 VITALS
HEIGHT: 65 IN | SYSTOLIC BLOOD PRESSURE: 134 MMHG | OXYGEN SATURATION: 95 % | WEIGHT: 225 LBS | DIASTOLIC BLOOD PRESSURE: 60 MMHG | BODY MASS INDEX: 37.49 KG/M2 | HEART RATE: 65 BPM

## 2025-04-14 DIAGNOSIS — G47.33 OSA (OBSTRUCTIVE SLEEP APNEA): ICD-10-CM

## 2025-04-14 DIAGNOSIS — E66.01 CLASS 2 SEVERE OBESITY DUE TO EXCESS CALORIES WITH SERIOUS COMORBIDITY AND BODY MASS INDEX (BMI) OF 35.0 TO 35.9 IN ADULT: ICD-10-CM

## 2025-04-14 DIAGNOSIS — E66.812 CLASS 2 SEVERE OBESITY DUE TO EXCESS CALORIES WITH SERIOUS COMORBIDITY AND BODY MASS INDEX (BMI) OF 35.0 TO 35.9 IN ADULT: ICD-10-CM

## 2025-04-14 DIAGNOSIS — I48.0 PAF (PAROXYSMAL ATRIAL FIBRILLATION) (HCC): ICD-10-CM

## 2025-04-14 DIAGNOSIS — R06.02 SHORTNESS OF BREATH: Primary | ICD-10-CM

## 2025-04-14 PROCEDURE — G8427 DOCREV CUR MEDS BY ELIG CLIN: HCPCS | Performed by: INTERNAL MEDICINE

## 2025-04-14 PROCEDURE — 1123F ACP DISCUSS/DSCN MKR DOCD: CPT | Performed by: INTERNAL MEDICINE

## 2025-04-14 PROCEDURE — G8417 CALC BMI ABV UP PARAM F/U: HCPCS | Performed by: INTERNAL MEDICINE

## 2025-04-14 PROCEDURE — 1159F MED LIST DOCD IN RCRD: CPT | Performed by: INTERNAL MEDICINE

## 2025-04-14 PROCEDURE — 3078F DIAST BP <80 MM HG: CPT | Performed by: INTERNAL MEDICINE

## 2025-04-14 PROCEDURE — G2211 COMPLEX E/M VISIT ADD ON: HCPCS | Performed by: INTERNAL MEDICINE

## 2025-04-14 PROCEDURE — 1036F TOBACCO NON-USER: CPT | Performed by: INTERNAL MEDICINE

## 2025-04-14 PROCEDURE — 3075F SYST BP GE 130 - 139MM HG: CPT | Performed by: INTERNAL MEDICINE

## 2025-04-14 PROCEDURE — 99214 OFFICE O/P EST MOD 30 MIN: CPT | Performed by: INTERNAL MEDICINE

## 2025-04-14 RX ORDER — ALBUTEROL SULFATE 90 UG/1
2 INHALANT RESPIRATORY (INHALATION) EVERY 4 HOURS PRN
Qty: 18 G | Refills: 3 | Status: SHIPPED | OUTPATIENT
Start: 2025-04-14

## 2025-04-14 RX ORDER — LOSARTAN POTASSIUM 50 MG/1
50 TABLET ORAL DAILY
COMMUNITY
Start: 2025-04-10

## 2025-04-14 NOTE — PROGRESS NOTES
PULMONARY OFFICE FOLLOW UP NOTE    REASON FOR VISIT:   Chief Complaint   Patient presents with    Sleep Apnea    Shortness of Breath     resolved         DATE OF VISIT: 4/14/2025     HISTORY OF PRESENT ILLNESS: 79 y.o. year old male comes into the pulmonary office for a follow-up.  Reports stable breathing without any new symptoms.  Has been using Flonase nasal spray off-and-on.  Again underwent cardioversion 4 months ago and has been staying out of atrial fibrillation.  Has not required to used Afrin nasal spray.  Using CPAP therapy regularly and feels benefit from it.  Denies any mask leakage or pressure intolerance.  Weight remains stable.        Previously: Reports stable breathing.  Has been taking Flonase nasal spray off-and-on.  Occasionally takes Afrin nasal spray in the middle of the night to decongest his nose.  Using his CPAP therapy regularly.  Was able to get a new machine.  Denies any mask leakage or pressure intolerance.  Patient reports that he has been tracking his sleep with Apple Watch.  Watch reports that he is sleeping only 6 hours every night.  He is not getting much of REM sleep.  Weight remains stable.  Did have a fall 1 month ago and since then has not been regularly exercising.    Patient reports that for last 2 weeks he has been having sinus congestion, postnasal drip and intermittent cough.  Cough is occasionally productive of clear expectoration.  No shortness of breath or wheezing.  No chest pain or chest tightness.  No fevers or night sweats.  Has been using Claritin 24 hours, which is only partially effective.  Has not been using his Flonase nasal spray.  Occasionally uses decongestant nasal spray at nighttime.  Continues to use his CPAP therapy regularly every night.  Denies any mask leakage or pressure intolerance.  His machine has become 5 years old and he wants to get a new machine.  Weight remains stable. Patient reports that about 2 weeks ago he suffered with acute

## 2025-05-09 ENCOUNTER — OFFICE VISIT (OUTPATIENT)
Dept: CARDIOLOGY CLINIC | Age: 80
End: 2025-05-09

## 2025-05-09 ENCOUNTER — CLINICAL SUPPORT (OUTPATIENT)
Dept: CARDIOLOGY CLINIC | Age: 80
End: 2025-05-09

## 2025-05-09 VITALS
BODY MASS INDEX: 37.15 KG/M2 | HEART RATE: 66 BPM | DIASTOLIC BLOOD PRESSURE: 80 MMHG | WEIGHT: 223 LBS | OXYGEN SATURATION: 96 % | SYSTOLIC BLOOD PRESSURE: 142 MMHG | HEIGHT: 65 IN

## 2025-05-09 DIAGNOSIS — E66.01 CLASS 2 SEVERE OBESITY DUE TO EXCESS CALORIES WITH SERIOUS COMORBIDITY AND BODY MASS INDEX (BMI) OF 35.0 TO 35.9 IN ADULT (HCC): ICD-10-CM

## 2025-05-09 DIAGNOSIS — I48.4 ATYPICAL ATRIAL FLUTTER (HCC): ICD-10-CM

## 2025-05-09 DIAGNOSIS — E66.812 CLASS 2 SEVERE OBESITY DUE TO EXCESS CALORIES WITH SERIOUS COMORBIDITY AND BODY MASS INDEX (BMI) OF 35.0 TO 35.9 IN ADULT (HCC): ICD-10-CM

## 2025-05-09 DIAGNOSIS — I48.0 PAF (PAROXYSMAL ATRIAL FIBRILLATION) (HCC): Primary | ICD-10-CM

## 2025-05-09 DIAGNOSIS — I48.19 PERSISTENT ATRIAL FIBRILLATION (HCC): ICD-10-CM

## 2025-05-09 DIAGNOSIS — Z95.818 STATUS POST PLACEMENT OF IMPLANTABLE LOOP RECORDER: ICD-10-CM

## 2025-05-09 DIAGNOSIS — I10 ESSENTIAL HYPERTENSION: ICD-10-CM

## 2025-05-09 RX ORDER — LOVASTATIN 40 MG/1
40 TABLET ORAL NIGHTLY
Qty: 30 TABLET | Refills: 0 | Status: SHIPPED
Start: 2025-05-09

## 2025-05-09 NOTE — PROGRESS NOTES
Patient comes in for their OV with NPSR.  Patient has [employer] Reveal [id number] LINQ II Implanted on 10/12/2022 with Dr. Flores.     [employer] Interrogation shows the Battery Status is GOOD. Since 1/8/2025 437 AF episodes noted with a 1.4% burden. Last noted on 1/23/2025, longest 26 minutes. 1 Tachy episode noted on 2/22/2025 lasting 6 seconds-NSVT. 0.3% PVC burden. Implanted for AF management. Patient remains Eliquis, metoprolol and propafenone. Please see interrogation scanned under Media tab for more detail. We will continue to follow the Patient remotely.

## 2025-05-22 PROCEDURE — 93298 REM INTERROG DEV EVAL SCRMS: CPT | Performed by: INTERNAL MEDICINE

## 2025-07-23 ENCOUNTER — TELEPHONE (OUTPATIENT)
Dept: CARDIOLOGY CLINIC | Age: 80
End: 2025-07-23

## 2025-07-23 DIAGNOSIS — I48.0 PAROXYSMAL ATRIAL FIBRILLATION (HCC): Primary | ICD-10-CM

## 2025-07-23 NOTE — TELEPHONE ENCOUNTER
If he cannot send in a transmission, then please arrange for him to go to one of our offices for an EKG. If afib is confirmed, we can arrange for a cardioversion as long as he has been taking his eliquis compliantly for the last 30 days

## 2025-07-23 NOTE — TELEPHONE ENCOUNTER
PROCEDURE: DCCV  PROCEDURE DATE: 8/6/25  ARRIVAL TIME: 730AM  PROCEDURE TIME: 830AM  PROVIDER/LOCATION: A at [facility]      Called and spoke to patient and he is agreeable to the date and time. Reviewed the Pre-Procedure instructions and they verbalized understanding. Encouraged to call with any questions or concerns.       Published on [employer] / emailed to Cath lab / note in chart / scheduled in Epic/[employer]

## 2025-07-23 NOTE — TELEPHONE ENCOUNTER
Pt calling in to let [facility] know that he has been in afib for the last week. Pt states while walking he is SOB. Pt denied CP or other symptoms. Pt states whil at PCP days ago BP was 130/74. Pt states he would like to do another cardioversion if [facility]XA agrees. Please call and advise   Thank you

## 2025-07-23 NOTE — TELEPHONE ENCOUNTER
Spoke to Pt, HR 70s while resting and HR 90s when active. Requested Pt send in transmission and he stated the device to send transmission no longer works. I gave him the [employer] number to contact regarding a transmission. He asked to get EKG at [place] office and I advised we did not have anyone at [facility] to do EKG today. Please advise and review transmission once received.

## 2025-07-23 NOTE — TELEPHONE ENCOUNTER
We received remote transmission from patient's monitor at home. Transmission shows normal sensing and pacing function.   Current ECG shows AF  EP will review. See interrogation [employer] for more details.

## 2025-07-23 NOTE — TELEPHONE ENCOUNTER
You are scheduled for a LISSA and/or cardioversion.    Our  will call you to discuss a date for your procedure.    The day of your procedure you will need to check in at the Registration Desk in the Main Lobby.    PRE-PROCEDURE INSTRUCTIONS   Do not eat or drink anything after midnight the night before your procedure.   Take all your medications with a sip of water in the morning.   Do not hold your Eliquis    Please have a responsible adult to drive you home after your procedure.    If you have any questions regarding your procedure itself or your medications, please call [phone] and ask to speak to an EP nurse.

## 2025-08-06 ENCOUNTER — HOSPITAL ENCOUNTER (OUTPATIENT)
Age: 80
Discharge: HOME | End: 2025-08-08
Payer: MEDICARE

## 2025-08-06 VITALS
DIASTOLIC BLOOD PRESSURE: 64 MMHG | WEIGHT: 223 LBS | RESPIRATION RATE: 19 BRPM | HEIGHT: 65 IN | SYSTOLIC BLOOD PRESSURE: 115 MMHG | HEART RATE: 61 BPM | BODY MASS INDEX: 37.15 KG/M2 | OXYGEN SATURATION: 98 %

## 2025-08-06 DIAGNOSIS — I48.0 PAROXYSMAL ATRIAL FIBRILLATION (HCC): ICD-10-CM

## 2025-08-06 LAB
DEPRECATED RDW RBC AUTO: 16.5 % (ref 12.4–15.4)
ECHO BSA: 2.15 M2
HCT VFR BLD AUTO: 40.7 % (ref 40.5–52.5)
HGB BLD-MCNC: 13.5 G/DL (ref 13.5–17.5)
MCH RBC QN AUTO: 25.8 PG (ref 26–34)
MCHC RBC AUTO-ENTMCNC: 33.3 G/DL (ref 31–36)
MCV RBC AUTO: 77.6 FL (ref 80–100)
PLATELET # BLD AUTO: 221 K/UL (ref 135–450)
PMV BLD AUTO: 7.9 FL (ref 5–10.5)
RBC # BLD AUTO: 5.24 M/UL (ref 4.2–5.9)
WBC # BLD AUTO: 6.3 K/UL (ref 4–11)

## 2025-08-06 PROCEDURE — 85027 COMPLETE CBC AUTOMATED: CPT

## 2025-08-06 PROCEDURE — 2500000003 HC RX 250 WO HCPCS: Performed by: INTERNAL MEDICINE

## 2025-08-06 PROCEDURE — 7100000011 HC PHASE II RECOVERY - ADDTL 15 MIN: Performed by: INTERNAL MEDICINE

## 2025-08-06 PROCEDURE — 92960 CARDIOVERSION ELECTRIC EXT: CPT

## 2025-08-06 PROCEDURE — 36415 COLL VENOUS BLD VENIPUNCTURE: CPT

## 2025-08-06 PROCEDURE — 7100000010 HC PHASE II RECOVERY - FIRST 15 MIN: Performed by: INTERNAL MEDICINE

## 2025-08-06 PROCEDURE — 99152 MOD SED SAME PHYS/QHP 5/>YRS: CPT | Performed by: INTERNAL MEDICINE

## 2025-08-06 RX ADMIN — METHOHEXITAL SODIUM 50 MG: 500 INJECTION, POWDER, LYOPHILIZED, FOR SOLUTION INTRAMUSCULAR; INTRAVENOUS; RECTAL at 08:55

## 2025-08-08 LAB
EKG ATRIAL RATE: 65 BPM
EKG DIAGNOSIS: NORMAL
EKG DIAGNOSIS: NORMAL
EKG P AXIS: 85 DEGREES
EKG P-R INTERVAL: 224 MS
EKG Q-T INTERVAL: 386 MS
EKG Q-T INTERVAL: 402 MS
EKG QRS DURATION: 114 MS
EKG QRS DURATION: 120 MS
EKG QTC CALCULATION (BAZETT): 418 MS
EKG QTC CALCULATION (BAZETT): 456 MS
EKG R AXIS: 39 DEGREES
EKG R AXIS: 47 DEGREES
EKG T AXIS: 18 DEGREES
EKG T AXIS: 55 DEGREES
EKG VENTRICULAR RATE: 65 BPM
EKG VENTRICULAR RATE: 84 BPM

## 2025-08-11 ENCOUNTER — TELEPHONE (OUTPATIENT)
Dept: CARDIOLOGY CLINIC | Age: 80
End: 2025-08-11

## 2025-08-19 PROBLEM — I48.91 A-FIB (HCC): Status: ACTIVE | Noted: 2025-08-14
